# Patient Record
Sex: MALE | Race: OTHER | HISPANIC OR LATINO | URBAN - METROPOLITAN AREA
[De-identification: names, ages, dates, MRNs, and addresses within clinical notes are randomized per-mention and may not be internally consistent; named-entity substitution may affect disease eponyms.]

---

## 2020-02-24 ENCOUNTER — EMERGENCY (EMERGENCY)
Facility: HOSPITAL | Age: 45
LOS: 1 days | Discharge: HOME | End: 2020-02-24
Attending: STUDENT IN AN ORGANIZED HEALTH CARE EDUCATION/TRAINING PROGRAM
Payer: SELF-PAY

## 2020-02-24 VITALS
HEART RATE: 73 BPM | RESPIRATION RATE: 20 BRPM | SYSTOLIC BLOOD PRESSURE: 127 MMHG | TEMPERATURE: 98 F | HEIGHT: 65 IN | DIASTOLIC BLOOD PRESSURE: 80 MMHG | WEIGHT: 171.96 LBS | OXYGEN SATURATION: 100 %

## 2020-02-24 LAB
APPEARANCE UR: CLEAR — SIGNIFICANT CHANGE UP
BILIRUB UR-MCNC: NEGATIVE — SIGNIFICANT CHANGE UP
COLOR SPEC: YELLOW — SIGNIFICANT CHANGE UP
DIFF PNL FLD: NEGATIVE — SIGNIFICANT CHANGE UP
GLUCOSE UR QL: NEGATIVE — SIGNIFICANT CHANGE UP
KETONES UR-MCNC: NEGATIVE — SIGNIFICANT CHANGE UP
LEUKOCYTE ESTERASE UR-ACNC: NEGATIVE — SIGNIFICANT CHANGE UP
NITRITE UR-MCNC: NEGATIVE — SIGNIFICANT CHANGE UP
PH UR: 7 — SIGNIFICANT CHANGE UP (ref 5–8)
PROT UR-MCNC: NEGATIVE — SIGNIFICANT CHANGE UP
SP GR SPEC: 1 — LOW (ref 1.01–1.02)
UROBILINOGEN FLD QL: NEGATIVE — SIGNIFICANT CHANGE UP

## 2020-02-24 PROCEDURE — 87491 CHLMYD TRACH DNA AMP PROBE: CPT

## 2020-02-24 PROCEDURE — 99284 EMERGENCY DEPT VISIT MOD MDM: CPT

## 2020-02-24 PROCEDURE — 87086 URINE CULTURE/COLONY COUNT: CPT

## 2020-02-24 PROCEDURE — 87591 N.GONORRHOEAE DNA AMP PROB: CPT

## 2020-02-24 PROCEDURE — 76870 US EXAM SCROTUM: CPT | Mod: 26

## 2020-02-24 PROCEDURE — 81003 URINALYSIS AUTO W/O SCOPE: CPT

## 2020-02-24 PROCEDURE — 99284 EMERGENCY DEPT VISIT MOD MDM: CPT | Mod: 25

## 2020-02-24 PROCEDURE — 96372 THER/PROPH/DIAG INJ SC/IM: CPT

## 2020-02-24 PROCEDURE — 76870 US EXAM SCROTUM: CPT

## 2020-02-24 RX ORDER — CEFTRIAXONE 500 MG/1
250 INJECTION, POWDER, FOR SOLUTION INTRAMUSCULAR; INTRAVENOUS ONCE
Refills: 0 | Status: COMPLETED | OUTPATIENT
Start: 2020-02-24 | End: 2020-02-24

## 2020-02-24 RX ORDER — AZITHROMYCIN 500 MG/1
1000 TABLET, FILM COATED ORAL ONCE
Refills: 0 | Status: COMPLETED | OUTPATIENT
Start: 2020-02-24 | End: 2020-02-24

## 2020-02-24 RX ADMIN — AZITHROMYCIN 1000 MILLIGRAM(S): 500 TABLET, FILM COATED ORAL at 21:54

## 2020-02-24 RX ADMIN — CEFTRIAXONE 250 MILLIGRAM(S): 500 INJECTION, POWDER, FOR SOLUTION INTRAMUSCULAR; INTRAVENOUS at 21:54

## 2020-02-24 NOTE — ED ADULT NURSE NOTE - OBJECTIVE STATEMENT
States he has pain to testicles and penis since tuesday with frequency of urination .Case discussed by Dr. Alvarenga and Elis NP with [place]  . Patient able to understand and speak english with certain questions upon assessment offered  but declined at times .

## 2020-02-24 NOTE — ED PROVIDER NOTE - OBJECTIVE STATEMENT
44 y.o presenting with testicular pain. endorse dysuria, symptoms x 1 week. denies n, v, fever, cp, sob. endorses that he is sexually active on with his wife.

## 2020-02-24 NOTE — ED PROVIDER NOTE - CLINICAL SUMMARY MEDICAL DECISION MAKING FREE TEXT BOX
Patient presenting with testicular pain. will obtain ua, assess for infection, us r/o torsion, ed obs and reassess

## 2020-02-24 NOTE — ED PROVIDER NOTE - PSYCHIATRIC, MLM
Alert and oriented to person, place, time/situation. normal mood and affect. no apparent risk to self or others.
177.8

## 2020-02-24 NOTE — ED PROVIDER NOTE - PROGRESS NOTE DETAILS
patient endorses dysuria, will give gc treatment, sent for cutlure, given uro f.u, negatiev torsion. well appearing

## 2020-02-25 LAB
CULTURE RESULTS: NO GROWTH — SIGNIFICANT CHANGE UP
SPECIMEN SOURCE: SIGNIFICANT CHANGE UP

## 2020-02-26 LAB
C TRACH RRNA SPEC QL NAA+PROBE: SIGNIFICANT CHANGE UP
N GONORRHOEA RRNA SPEC QL NAA+PROBE: SIGNIFICANT CHANGE UP
SPECIMEN SOURCE: SIGNIFICANT CHANGE UP

## 2021-10-03 ENCOUNTER — EMERGENCY (EMERGENCY)
Facility: HOSPITAL | Age: 46
LOS: 1 days | Discharge: HOME | End: 2021-10-03
Attending: STUDENT IN AN ORGANIZED HEALTH CARE EDUCATION/TRAINING PROGRAM
Payer: MEDICAID

## 2021-10-03 VITALS
SYSTOLIC BLOOD PRESSURE: 117 MMHG | WEIGHT: 171.96 LBS | RESPIRATION RATE: 17 BRPM | DIASTOLIC BLOOD PRESSURE: 76 MMHG | TEMPERATURE: 98 F | OXYGEN SATURATION: 96 % | HEIGHT: 65 IN | HEART RATE: 69 BPM

## 2021-10-03 LAB
APPEARANCE UR: CLEAR — SIGNIFICANT CHANGE UP
BACTERIA # UR AUTO: ABNORMAL /HPF
BILIRUB UR-MCNC: NEGATIVE — SIGNIFICANT CHANGE UP
COLOR SPEC: YELLOW — SIGNIFICANT CHANGE UP
DIFF PNL FLD: ABNORMAL
EPI CELLS # UR: ABNORMAL /HPF
GLUCOSE UR QL: NEGATIVE — SIGNIFICANT CHANGE UP
KETONES UR-MCNC: NEGATIVE — SIGNIFICANT CHANGE UP
LEUKOCYTE ESTERASE UR-ACNC: NEGATIVE — SIGNIFICANT CHANGE UP
NITRITE UR-MCNC: NEGATIVE — SIGNIFICANT CHANGE UP
PH UR: 6.5 — SIGNIFICANT CHANGE UP (ref 5–8)
PROT UR-MCNC: NEGATIVE — SIGNIFICANT CHANGE UP
RBC CASTS # UR COMP ASSIST: ABNORMAL /HPF (ref 0–2)
SP GR SPEC: 1 — LOW (ref 1.01–1.02)
UROBILINOGEN FLD QL: NEGATIVE — SIGNIFICANT CHANGE UP
WBC UR QL: SIGNIFICANT CHANGE UP /HPF (ref 0–5)

## 2021-10-03 PROCEDURE — 76870 US EXAM SCROTUM: CPT

## 2021-10-03 PROCEDURE — 93975 VASCULAR STUDY: CPT | Mod: 26

## 2021-10-03 PROCEDURE — 87491 CHLMYD TRACH DNA AMP PROBE: CPT

## 2021-10-03 PROCEDURE — 76870 US EXAM SCROTUM: CPT | Mod: 26

## 2021-10-03 PROCEDURE — 81001 URINALYSIS AUTO W/SCOPE: CPT

## 2021-10-03 PROCEDURE — 93975 VASCULAR STUDY: CPT

## 2021-10-03 PROCEDURE — 87591 N.GONORRHOEAE DNA AMP PROB: CPT

## 2021-10-03 PROCEDURE — 99284 EMERGENCY DEPT VISIT MOD MDM: CPT | Mod: 25

## 2021-10-03 PROCEDURE — 99285 EMERGENCY DEPT VISIT HI MDM: CPT

## 2021-10-03 RX ORDER — CEPHALEXIN 500 MG
1 CAPSULE ORAL
Qty: 10 | Refills: 0
Start: 2021-10-03 | End: 2021-10-07

## 2021-10-03 RX ORDER — TAMSULOSIN HYDROCHLORIDE 0.4 MG/1
1 CAPSULE ORAL
Qty: 10 | Refills: 0
Start: 2021-10-03 | End: 2021-10-12

## 2021-10-03 RX ORDER — IBUPROFEN 200 MG
1 TABLET ORAL
Qty: 15 | Refills: 0
Start: 2021-10-03 | End: 2021-10-07

## 2021-10-03 NOTE — ED PROVIDER NOTE - OBJECTIVE STATEMENT
46yo M with pmh of BPH and previous renal stones presenting with several days of dysuria. Patient has been having mild suprapubic pain, but no fevers, flank pain, N, V, gross blood in the urine, penile discharge. Patient did endorse 2 days of right testicular pain as well but says has improved. Says feels like previous passing of stone as feels grainy sensation when finishing urination

## 2021-10-03 NOTE — ED PROVIDER NOTE - CLINICAL SUMMARY MEDICAL DECISION MAKING FREE TEXT BOX
Most consistent with kidney vs bladder stone passing +/- superimposed UTI. Testicular US with no evidence of epydidimitis. UA + for blood. Patient says after hydration and meds was able to pass a few tiny pieces in the bathroom. but still has some dysuria. Given improvement, stable vitals, good urine output and to hydro- on bedside sono assessment, like distal stone, non-obstructing +/- superimposed UTI. Will treat and send for close  f/u. Patient feels comfortable with the plan.

## 2021-10-03 NOTE — ED PROVIDER NOTE - PATIENT PORTAL LINK FT
You can access the FollowMyHealth Patient Portal offered by [facility] by registering at the following website: [email or web address]. By joining [employer]’s FollowMyHealth portal, you will also be able to view your health information using other applications (apps) compatible with our system.

## 2021-10-04 LAB
C TRACH RRNA SPEC QL NAA+PROBE: SIGNIFICANT CHANGE UP
N GONORRHOEA RRNA SPEC QL NAA+PROBE: SIGNIFICANT CHANGE UP

## 2021-10-30 ENCOUNTER — EMERGENCY (EMERGENCY)
Facility: HOSPITAL | Age: 46
LOS: 1 days | Discharge: HOME | End: 2021-10-30
Attending: STUDENT IN AN ORGANIZED HEALTH CARE EDUCATION/TRAINING PROGRAM
Payer: MEDICAID

## 2021-10-30 VITALS
SYSTOLIC BLOOD PRESSURE: 125 MMHG | TEMPERATURE: 98 F | DIASTOLIC BLOOD PRESSURE: 73 MMHG | WEIGHT: 175.05 LBS | HEIGHT: 65 IN | HEART RATE: 73 BPM | OXYGEN SATURATION: 97 % | RESPIRATION RATE: 16 BRPM

## 2021-10-30 LAB
APPEARANCE UR: CLEAR — SIGNIFICANT CHANGE UP
BACTERIA # UR AUTO: ABNORMAL /HPF
BILIRUB UR-MCNC: NEGATIVE — SIGNIFICANT CHANGE UP
COLOR SPEC: YELLOW — SIGNIFICANT CHANGE UP
DIFF PNL FLD: ABNORMAL
GLUCOSE UR QL: NEGATIVE — SIGNIFICANT CHANGE UP
KETONES UR-MCNC: NEGATIVE — SIGNIFICANT CHANGE UP
LEUKOCYTE ESTERASE UR-ACNC: NEGATIVE — SIGNIFICANT CHANGE UP
NITRITE UR-MCNC: NEGATIVE — SIGNIFICANT CHANGE UP
PH UR: 6 — SIGNIFICANT CHANGE UP (ref 5–8)
PROT UR-MCNC: NEGATIVE — SIGNIFICANT CHANGE UP
RBC CASTS # UR COMP ASSIST: ABNORMAL /HPF (ref 0–2)
SP GR SPEC: 1.01 — SIGNIFICANT CHANGE UP (ref 1.01–1.02)
UROBILINOGEN FLD QL: NEGATIVE — SIGNIFICANT CHANGE UP
WBC UR QL: SIGNIFICANT CHANGE UP /HPF (ref 0–5)

## 2021-10-30 PROCEDURE — 96372 THER/PROPH/DIAG INJ SC/IM: CPT

## 2021-10-30 PROCEDURE — 87086 URINE CULTURE/COLONY COUNT: CPT

## 2021-10-30 PROCEDURE — 99284 EMERGENCY DEPT VISIT MOD MDM: CPT

## 2021-10-30 PROCEDURE — 99283 EMERGENCY DEPT VISIT LOW MDM: CPT | Mod: 25

## 2021-10-30 PROCEDURE — 81001 URINALYSIS AUTO W/SCOPE: CPT

## 2021-10-30 RX ORDER — KETOROLAC TROMETHAMINE 30 MG/ML
30 SYRINGE (ML) INJECTION ONCE
Refills: 0 | Status: DISCONTINUED | OUTPATIENT
Start: 2021-10-30 | End: 2021-10-30

## 2021-10-30 RX ORDER — ACETAMINOPHEN 500 MG
975 TABLET ORAL ONCE
Refills: 0 | Status: COMPLETED | OUTPATIENT
Start: 2021-10-30 | End: 2021-10-30

## 2021-10-30 RX ADMIN — Medication 30 MILLIGRAM(S): at 19:46

## 2021-10-30 RX ADMIN — Medication 975 MILLIGRAM(S): at 19:46

## 2021-10-30 NOTE — ED PROVIDER NOTE - PATIENT PORTAL LINK FT
You can access the FollowMyHealth Patient Portal offered by [facility] by registering at the following website: [email or web address]. By joining [employer]’s FollowMyHealth portal, you will also be able to view your health information using other applications (apps) compatible with our system.

## 2021-10-30 NOTE — ED PROVIDER NOTE - PHYSICAL EXAMINATION
Gen: AAOx3, non-toxic  Head: NCAT  HEENT: EOMI, oral mucosa moist, normal conjunctiva  Lung: CTAB, no respiratory distress, no wheezes/rhonchi/rales B/L, speaking in full sentences  CV: RRR, no murmurs, rubs or gallops  Abd: soft, NTND, no guarding, no CVA tenderness  : no rash, no penile or testicular tenderness/redness/swelling, cremasteric reflex intact and equal b/l   MSK: no visible deformities  Neuro: No focal sensory or motor deficits  Skin: Warm, well perfused, no rash  Psych: normal affect.     Edilson Vargas, DO

## 2021-10-30 NOTE — ED PROVIDER NOTE - OBJECTIVE STATEMENT
45M with PMH including kidney stones and BPH p/w dysuria x 3 days. Denies any other symptoms including fever, abd pain, back pain, frequency, discharge, testicular pain, nausea, vomiting. Was here on 10/3 for similar symptoms at which time UA was sig for blood but no infection. Sates the dysuria resolved until recurring 3 days ago.

## 2021-10-30 NOTE — ED PROVIDER NOTE - NS ED ROS FT
ROS:  GENERAL: No fever, no chills  EYES: no change in vision  HEENT: no trouble swallowing, no trouble speaking  CARDIAC: no chest pain  PULMONARY: no cough, no shortness of breath  GI: no abdominal pain, no nausea, no vomiting, no diarrhea, no constipation  : +dysuria.  no frequency, no change in appearance, or odor of urine  SKIN: no rashes  NEURO: no headache, no weakness  MSK: No joint pain    Edilson Vargas, DO

## 2021-10-30 NOTE — ED PROVIDER NOTE - NSPTACCESSSVCSAPPTDETAILS_ED_ALL_ED_FT
needs urology within 1 week needs urology within 1 week -- call wife's number per pt's request [phone]

## 2021-10-30 NOTE — ED PROVIDER NOTE - CLINICAL SUMMARY MEDICAL DECISION MAKING FREE TEXT BOX
45M with PMH including kidney stones and BPH p/w dysuria x 3 days. Denies any other symptoms including abd/back pain, fever, N/V. No trouble voiding. Pt well appearing, normal  exam. Will assess for UTI and if normal have pt f/u with urology.

## 2021-10-31 PROBLEM — N40.0 BENIGN PROSTATIC HYPERPLASIA WITHOUT LOWER URINARY TRACT SYMPTOMS: Chronic | Status: ACTIVE | Noted: 2021-10-08

## 2021-10-31 PROBLEM — N20.0 CALCULUS OF KIDNEY: Chronic | Status: ACTIVE | Noted: 2021-10-08

## 2021-10-31 LAB
CULTURE RESULTS: SIGNIFICANT CHANGE UP
SPECIMEN SOURCE: SIGNIFICANT CHANGE UP

## 2021-11-05 ENCOUNTER — RESULT REVIEW (OUTPATIENT)
Age: 46
End: 2021-11-05

## 2021-11-05 ENCOUNTER — OUTPATIENT (OUTPATIENT)
Dept: OUTPATIENT SERVICES | Facility: HOSPITAL | Age: 46
LOS: 1 days | End: 2021-11-05
Payer: SELF-PAY

## 2021-11-05 ENCOUNTER — APPOINTMENT (OUTPATIENT)
Dept: UROLOGY | Facility: CLINIC | Age: 46
End: 2021-11-05

## 2021-11-05 VITALS
OXYGEN SATURATION: 98 % | HEIGHT: 65 IN | HEART RATE: 71 BPM | WEIGHT: 175 LBS | TEMPERATURE: 97.3 F | DIASTOLIC BLOOD PRESSURE: 72 MMHG | BODY MASS INDEX: 29.16 KG/M2 | RESPIRATION RATE: 18 BRPM | SYSTOLIC BLOOD PRESSURE: 109 MMHG

## 2021-11-05 DIAGNOSIS — Z87.442 PERSONAL HISTORY OF URINARY CALCULI: ICD-10-CM

## 2021-11-05 DIAGNOSIS — Z87.891 PERSONAL HISTORY OF NICOTINE DEPENDENCE: ICD-10-CM

## 2021-11-05 DIAGNOSIS — Z00.00 ENCOUNTER FOR GENERAL ADULT MEDICAL EXAMINATION WITHOUT ABNORMAL FINDINGS: ICD-10-CM

## 2021-11-05 PROCEDURE — G0463: CPT

## 2021-11-05 NOTE — PHYSICAL EXAM
[General Appearance - Well Developed] : well developed [General Appearance - Well Nourished] : well nourished [Normal Appearance] : normal appearance [Well Groomed] : well groomed [General Appearance - In No Acute Distress] : no acute distress [Edema] : no peripheral edema [Respiration, Rhythm And Depth] : normal respiratory rhythm and effort [Exaggerated Use Of Accessory Muscles For Inspiration] : no accessory muscle use [Abdomen Soft] : soft [Abdomen Tenderness] : non-tender [Costovertebral Angle Tenderness] : no ~M costovertebral angle tenderness [Urethral Meatus] : meatus normal [Penis Abnormality] : normal uncircumcised penis [Urinary Bladder Findings] : the bladder was normal on palpation [Scrotum] : the scrotum was normal [Epididymis] : the epididymides were normal [Testes Tenderness] : no tenderness of the testes [Testes Mass (___cm)] : there were no testicular masses [No Prostate Nodules] : no prostate nodules [Normal Station and Gait] : the gait and station were normal for the patient's age [] : no rash [No Focal Deficits] : no focal deficits [Oriented To Time, Place, And Person] : oriented to person, place, and time [Affect] : the affect was normal [Mood] : the mood was normal [Not Anxious] : not anxious [No Palpable Adenopathy] : no palpable adenopathy [FreeTextEntry1] : TESHA normal smooth nontender prostate

## 2021-11-05 NOTE — ASSESSMENT
[FreeTextEntry1] : 45 year old male with PMH nephrolithiasis, presenting with 6 weeks of dysuria and pain at the tip of penis not improved with keflex. \par \par - ua and ucx\par - doxycycline x 10 days\par - renal US \par - RTC 2 weeks\par - consider cystoscopy if symptoms persist and UA positive rbc \par - patient and spouse applied for sliding scale insurance with staff today

## 2021-11-05 NOTE — HISTORY OF PRESENT ILLNESS
[FreeTextEntry1] : 45 year old male with PMH nephrolithiasis 15 years ago presenting for initial visit after patient presented to the ED with dysuria 10/3. Pain first started in the last week of September and is described as burning with urination and pain at the tip at rest. In the ED, urine culture was negative, G/c urine was negative, US doppler testicles normal. UA 10-25 RBC, no gross hematuria, no history of smoking. Given keflex antibiotcs, which improved symptoms for 2 weeks but then it returned and patient went back to ED 10/30 and was referred to the urology clinic. Denies history of sexually transmitted diseases and declines repeat testing today. States he [place] many kidney [place], has not noted any renal colic or stones passing recently, and has not had any recent radiographic evaluation.

## 2021-11-08 DIAGNOSIS — R30.0 DYSURIA: ICD-10-CM

## 2021-11-08 DIAGNOSIS — Z87.442 PERSONAL HISTORY OF URINARY CALCULI: ICD-10-CM

## 2021-11-17 ENCOUNTER — APPOINTMENT (OUTPATIENT)
Dept: ULTRASOUND IMAGING | Facility: HOSPITAL | Age: 46
End: 2021-11-17
Payer: MEDICAID

## 2021-11-17 ENCOUNTER — APPOINTMENT (OUTPATIENT)
Dept: ULTRASOUND IMAGING | Facility: HOSPITAL | Age: 46
End: 2021-11-17

## 2021-11-17 ENCOUNTER — OUTPATIENT (OUTPATIENT)
Dept: OUTPATIENT SERVICES | Facility: HOSPITAL | Age: 46
LOS: 1 days | End: 2021-11-17
Payer: SELF-PAY

## 2021-11-17 DIAGNOSIS — R30.0 DYSURIA: ICD-10-CM

## 2021-11-17 PROCEDURE — 76775 US EXAM ABDO BACK WALL LIM: CPT

## 2021-11-17 PROCEDURE — 76857 US EXAM PELVIC LIMITED: CPT

## 2021-11-17 PROCEDURE — 76770 US EXAM ABDO BACK WALL COMP: CPT | Mod: 26

## 2021-12-02 ENCOUNTER — LABORATORY RESULT (OUTPATIENT)
Age: 46
End: 2021-12-02

## 2021-12-03 ENCOUNTER — APPOINTMENT (OUTPATIENT)
Dept: UROLOGY | Facility: CLINIC | Age: 46
End: 2021-12-03

## 2021-12-03 ENCOUNTER — OUTPATIENT (OUTPATIENT)
Dept: OUTPATIENT SERVICES | Facility: HOSPITAL | Age: 46
LOS: 1 days | End: 2021-12-03
Payer: SELF-PAY

## 2021-12-03 VITALS
OXYGEN SATURATION: 98 % | HEIGHT: 65 IN | TEMPERATURE: 97.8 F | BODY MASS INDEX: 29.16 KG/M2 | WEIGHT: 175 LBS | SYSTOLIC BLOOD PRESSURE: 125 MMHG | DIASTOLIC BLOOD PRESSURE: 79 MMHG | HEART RATE: 71 BPM

## 2021-12-03 DIAGNOSIS — Z00.00 ENCOUNTER FOR GENERAL ADULT MEDICAL EXAMINATION WITHOUT ABNORMAL FINDINGS: ICD-10-CM

## 2021-12-03 PROCEDURE — G0463: CPT

## 2021-12-03 NOTE — HISTORY OF PRESENT ILLNESS
[FreeTextEntry1] : 45 year old male with PMH nephrolithiasis 15 years ago presenting for follow-up visit after patient presented to the ED with dysuria 10/3. Pain first started in the last week of September and is described as burning with urination and pain at the tip at rest. In the ED, urine culture was negative, G/c urine was negative, US doppler testicles normal. UA 10-25 RBC, no gross hematuria, no history of smoking. Given keflex antibiotic which improved symptoms for 2 weeks but then it returned and patient went back to ED 10/30 and was referred to the urology clinic. Denies history of sexually transmitted diseases and declines repeat testing today. States he had many kidney stones pass [place] surgery, has not noted any renal colic or stones passing recently, and has not had any recent radiographic evaluation.\par \par Interval history :\par UA demonstrated 84 RBC \par Urine culture negative \par Ultrasound demonstrated bilateral nonobstructing renal stones measuring up to 4 mm on left and 1.1 cm on right without hydronephrosis and a 7mm non-mobile left posterior bladder wall calcification surrounded by soft tissue concerning for possible distal ureteral stone versus stone within ureterocele. \par \par Patient presents today with wife for follow-up. States he passed a stone while voiding which he brought to the clinic in a container. States the dysuria resolved after the stone passed. Denies hematuria, fevers.

## 2021-12-03 NOTE — ASSESSMENT
[FreeTextEntry1] : 45 year old male with PMH nephrolithiasis, presenting with 6 weeks of dysuria and pain at the tip of penis not improved with keflex. Ultrasound demonstrating bilateral nonobstructing intrarenal calculi and posterior wall stone, possible distal ureter. Patient passed a stone at home (brought to office) and pain has since resolved. \par Cystoscopy demonstrated normal bladder and ureteral orifices\par \par - CT renal stone hunt given bilateral stones on renal ultrasound\par - Renal ultrasound images reviewed demonstrating bilateral intrarenal stones as well as a 7 mm calcification in the posterior wall of the bladder\par - Urine culture\par - calculus analysis\par - Cystoscopy today demonstrates no urothelial lesions or calcifications in the bladder\par - OR planning for bilateral URS after CT scan

## 2021-12-07 DIAGNOSIS — R30.0 DYSURIA: ICD-10-CM

## 2021-12-07 DIAGNOSIS — N20.0 CALCULUS OF KIDNEY: ICD-10-CM

## 2021-12-09 ENCOUNTER — OUTPATIENT (OUTPATIENT)
Dept: OUTPATIENT SERVICES | Facility: HOSPITAL | Age: 46
LOS: 1 days | End: 2021-12-09
Payer: SELF-PAY

## 2021-12-09 ENCOUNTER — LABORATORY RESULT (OUTPATIENT)
Age: 46
End: 2021-12-09

## 2021-12-09 ENCOUNTER — APPOINTMENT (OUTPATIENT)
Dept: INTERNAL MEDICINE | Facility: CLINIC | Age: 46
End: 2021-12-09
Payer: COMMERCIAL

## 2021-12-09 VITALS
HEART RATE: 74 BPM | SYSTOLIC BLOOD PRESSURE: 112 MMHG | BODY MASS INDEX: 28.49 KG/M2 | HEIGHT: 65 IN | TEMPERATURE: 99 F | DIASTOLIC BLOOD PRESSURE: 74 MMHG | OXYGEN SATURATION: 98 % | WEIGHT: 171 LBS

## 2021-12-09 DIAGNOSIS — Z57.9 OCCUPATIONAL EXPOSURE TO UNSPECIFIED RISK FACTOR: ICD-10-CM

## 2021-12-09 DIAGNOSIS — Z00.00 ENCOUNTER FOR GENERAL ADULT MEDICAL EXAMINATION WITHOUT ABNORMAL FINDINGS: ICD-10-CM

## 2021-12-09 DIAGNOSIS — Z87.891 PERSONAL HISTORY OF NICOTINE DEPENDENCE: ICD-10-CM

## 2021-12-09 PROCEDURE — 85025 COMPLETE CBC W/AUTO DIFF WBC: CPT

## 2021-12-09 PROCEDURE — 86803 HEPATITIS C AB TEST: CPT

## 2021-12-09 PROCEDURE — G0463: CPT

## 2021-12-09 PROCEDURE — 80053 COMPREHEN METABOLIC PANEL: CPT

## 2021-12-09 PROCEDURE — 87340 HEPATITIS B SURFACE AG IA: CPT

## 2021-12-09 PROCEDURE — 99386 PREV VISIT NEW AGE 40-64: CPT

## 2021-12-09 PROCEDURE — 80061 LIPID PANEL: CPT

## 2021-12-09 PROCEDURE — 86706 HEP B SURFACE ANTIBODY: CPT

## 2021-12-09 PROCEDURE — 82306 VITAMIN D 25 HYDROXY: CPT

## 2021-12-09 PROCEDURE — 83036 HEMOGLOBIN GLYCOSYLATED A1C: CPT

## 2021-12-09 PROCEDURE — 81001 URINALYSIS AUTO W/SCOPE: CPT

## 2021-12-09 PROCEDURE — 84443 ASSAY THYROID STIM HORMONE: CPT

## 2021-12-09 SDOH — HEALTH STABILITY - PHYSICAL HEALTH: OCCUPATIONAL EXPOSURE TO UNSPECIFIED RISK FACTOR: Z57.9

## 2021-12-09 NOTE — HISTORY OF PRESENT ILLNESS
[[employer] ] : provided by [employer]   [Interpreters_IDNumber] : [id number] [TWNoteComboBox1] : [place]ogolese [de-identified] : 45 y.o MS w/pmhx of renal stone here to establish care and annual\par \par has never follow up with pcp \par \par denies of any HA/CP/SOB/Palpitation\par \par \par had recurrent uti was in ED and had renal stone \par  saw uro \par had US renal and planning for CT per uro \par current no uro symptoms\par \par ROS\par \par Constitutional:  no fever and no chills. \par Eyes:  no discharge and no pain. \par HEENT:  no earache and no hearing loss. \par Cardiovascular:  no chest pain, no palpitations and no leg claudication. \par Respiratory:  no shortness of breath, no wheezing and no cough. \par Gastrointestinal:  no abdominal pain, no nausea and no constipation. \par Genitourinary:  no dysuria and no incontinence. \par Musculoskeletal:  no joint pain, no joint stiffness and no joint swelling. \par Integumentary:  no itching and no mole changes. \par Neurological:  no headache, no dizziness and no fainting. \par Psychiatric:  not suicidal, no insomnia, no anxiety and no depression. \par \par Physical Exam\par \par Constitutional:   no acute distress, well nourished, well developed and well-appearing. \par Eyes:  normal sclera/conjunctiva, pupils equal round and reactive to light and extraocular movements intact. \par ENT:  the outer ears and nose were normal in appearance and the oropharynx was normal. \par Neck:  supple, no lymphadenopathy and the thyroid was normal and there were no nodules present. \par Pulmonary:  no respiratory distress, lungs were clear to auscultation bilaterally, no accessory muscle use. \par Cardiac:  normal rate, with a regular rhythm, normal S1 and S2 and no murmur heard. \par Vascular:  there was no peripheral edema. \par Abdomen:  abdomen soft, non-tender, non-distended, no abdominal mass palpated, no HSM and normal bowel sounds. \par Lymphatic:  no posterior cervical lymphadenopathy, no anterior cervical lymphadenopathy. \par Back:  no CVA tenderness and no spinal tenderness. \par Musculoskeletal: no joint swelling and grossly normal strength/tone. \par Skin:  no rash. \par Neurology:  normal gait, coordination grossly intact, no focal deficits and deep tendon reflexes were 2+ and symmetric. \par Psychiatric:  the affect was normal, oriented to person, place, and time and insight and judgment were intact.\par

## 2021-12-09 NOTE — ASSESSMENT
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

## 2021-12-09 NOTE — HEALTH RISK ASSESSMENT
[No] : In the past 12 months have you used drugs other than those required for medical reasons? No [0] : 2) Feeling down, depressed, or hopeless: Not at all (0) [With Family] : lives with family [Employed] : employed [Sexually Active] : sexually active [Feels Safe at Home] : Feels safe at home [Fully functional (bathing, dressing, toileting, transferring, walking, feeding)] : Fully functional (bathing, dressing, toileting, transferring, walking, feeding) [Fully functional (using the telephone, shopping, preparing meals, housekeeping, doing laundry, using] : Fully functional and needs no help or supervision to perform IADLs (using the telephone, shopping, preparing meals, housekeeping, doing laundry, using transportation, managing medications and managing finances) [] : No [de-identified] : quit smoking 15 yrs; 1cigerates a day for 20 yrs; [OSF6Knlfs] : 0 [High Risk Behavior] : no high risk behavior [FreeTextEntry2] : construction;

## 2021-12-14 DIAGNOSIS — Z57.9 OCCUPATIONAL EXPOSURE TO UNSPECIFIED RISK FACTOR: ICD-10-CM

## 2021-12-14 DIAGNOSIS — Z87.891 PERSONAL HISTORY OF NICOTINE DEPENDENCE: ICD-10-CM

## 2021-12-14 DIAGNOSIS — Z12.11 ENCOUNTER FOR SCREENING FOR MALIGNANT NEOPLASM OF COLON: ICD-10-CM

## 2021-12-14 DIAGNOSIS — N20.0 CALCULUS OF KIDNEY: ICD-10-CM

## 2021-12-14 SDOH — HEALTH STABILITY - PHYSICAL HEALTH: OCCUPATIONAL EXPOSURE TO UNSPECIFIED RISK FACTOR: Z57.9

## 2021-12-15 ENCOUNTER — OUTPATIENT (OUTPATIENT)
Dept: OUTPATIENT SERVICES | Facility: HOSPITAL | Age: 46
LOS: 1 days | End: 2021-12-15
Payer: SELF-PAY

## 2021-12-15 ENCOUNTER — APPOINTMENT (OUTPATIENT)
Dept: CT IMAGING | Facility: HOSPITAL | Age: 46
End: 2021-12-15

## 2021-12-15 DIAGNOSIS — Z57.9 OCCUPATIONAL EXPOSURE TO UNSPECIFIED RISK FACTOR: ICD-10-CM

## 2021-12-15 DIAGNOSIS — Z87.442 PERSONAL HISTORY OF URINARY CALCULI: ICD-10-CM

## 2021-12-15 DIAGNOSIS — Z87.891 PERSONAL HISTORY OF NICOTINE DEPENDENCE: ICD-10-CM

## 2021-12-15 PROCEDURE — 82274 ASSAY TEST FOR BLOOD FECAL: CPT

## 2021-12-15 PROCEDURE — 74176 CT ABD & PELVIS W/O CONTRAST: CPT | Mod: 26

## 2021-12-15 PROCEDURE — 71046 X-RAY EXAM CHEST 2 VIEWS: CPT

## 2021-12-15 PROCEDURE — 74176 CT ABD & PELVIS W/O CONTRAST: CPT

## 2021-12-15 PROCEDURE — 71046 X-RAY EXAM CHEST 2 VIEWS: CPT | Mod: 26

## 2021-12-15 SDOH — HEALTH STABILITY - PHYSICAL HEALTH: OCCUPATIONAL EXPOSURE TO UNSPECIFIED RISK FACTOR: Z57.9

## 2021-12-23 ENCOUNTER — OUTPATIENT (OUTPATIENT)
Dept: OUTPATIENT SERVICES | Facility: HOSPITAL | Age: 46
LOS: 1 days | End: 2021-12-23
Payer: SELF-PAY

## 2021-12-23 ENCOUNTER — APPOINTMENT (OUTPATIENT)
Dept: INTERNAL MEDICINE | Facility: CLINIC | Age: 46
End: 2021-12-23
Payer: COMMERCIAL

## 2021-12-23 VITALS
DIASTOLIC BLOOD PRESSURE: 82 MMHG | OXYGEN SATURATION: 97 % | RESPIRATION RATE: 18 BRPM | HEIGHT: 65 IN | SYSTOLIC BLOOD PRESSURE: 121 MMHG | WEIGHT: 174 LBS | BODY MASS INDEX: 28.99 KG/M2 | HEART RATE: 85 BPM | TEMPERATURE: 97.2 F

## 2021-12-23 DIAGNOSIS — Z23 ENCOUNTER FOR IMMUNIZATION: ICD-10-CM

## 2021-12-23 DIAGNOSIS — Z78.9 OTHER SPECIFIED HEALTH STATUS: ICD-10-CM

## 2021-12-23 DIAGNOSIS — E78.5 HYPERLIPIDEMIA, UNSPECIFIED: ICD-10-CM

## 2021-12-23 DIAGNOSIS — N20.0 CALCULUS OF KIDNEY: ICD-10-CM

## 2021-12-23 DIAGNOSIS — E55.9 VITAMIN D DEFICIENCY, UNSPECIFIED: ICD-10-CM

## 2021-12-23 DIAGNOSIS — Z00.00 ENCOUNTER FOR GENERAL ADULT MEDICAL EXAMINATION WITHOUT ABNORMAL FINDINGS: ICD-10-CM

## 2021-12-23 LAB
25(OH)D3 SERPL-MCNC: 18 NG/ML
ALBUMIN SERPL ELPH-MCNC: 4.6 G/DL
ALP BLD-CCNC: 110 U/L
ALT SERPL-CCNC: 29 U/L
ANION GAP SERPL CALC-SCNC: 15 MMOL/L
APPEARANCE: ABNORMAL
AST SERPL-CCNC: 24 U/L
BASOPHILS # BLD AUTO: 0.02 K/UL
BASOPHILS NFR BLD AUTO: 0.3 %
BILIRUB SERPL-MCNC: 0.7 MG/DL
BILIRUBIN URINE: NEGATIVE
BLOOD URINE: NORMAL
BUN SERPL-MCNC: 13 MG/DL
CALCIUM SERPL-MCNC: 9.1 MG/DL
CHLORIDE SERPL-SCNC: 104 MMOL/L
CHOLEST SERPL-MCNC: 223 MG/DL
CO2 SERPL-SCNC: 22 MMOL/L
COLOR: YELLOW
CREAT SERPL-MCNC: 0.68 MG/DL
EOSINOPHIL # BLD AUTO: 0.06 K/UL
EOSINOPHIL NFR BLD AUTO: 0.9 %
ESTIMATED AVERAGE GLUCOSE: 103 MG/DL
GLUCOSE QUALITATIVE U: NEGATIVE
GLUCOSE SERPL-MCNC: 82 MG/DL
HBA1C MFR BLD HPLC: 5.2 %
HBV SURFACE AB SER QL: NONREACTIVE
HBV SURFACE AG SER QL: NONREACTIVE
HCT VFR BLD CALC: 46.1 %
HCV AB SER QL: NONREACTIVE
HCV S/CO RATIO: 0.2 S/CO
HDLC SERPL-MCNC: 56 MG/DL
HGB BLD-MCNC: 15.5 G/DL
IMM GRANULOCYTES NFR BLD AUTO: 0.2 %
KETONES URINE: NEGATIVE
LDLC SERPL CALC-MCNC: 139 MG/DL
LEUKOCYTE ESTERASE URINE: NEGATIVE
LYMPHOCYTES # BLD AUTO: 1.72 K/UL
LYMPHOCYTES NFR BLD AUTO: 26.7 %
MAN DIFF?: NORMAL
MCHC RBC-ENTMCNC: 29.8 PG
MCHC RBC-ENTMCNC: 33.6 GM/DL
MCV RBC AUTO: 88.7 FL
MONOCYTES # BLD AUTO: 0.47 K/UL
MONOCYTES NFR BLD AUTO: 7.3 %
NEUTROPHILS # BLD AUTO: 4.16 K/UL
NEUTROPHILS NFR BLD AUTO: 64.6 %
NITRITE URINE: NEGATIVE
NONHDLC SERPL-MCNC: 167 MG/DL
PH URINE: 6
PLATELET # BLD AUTO: 286 K/UL
POTASSIUM SERPL-SCNC: 5.2 MMOL/L
PROT SERPL-MCNC: 7.6 G/DL
PROTEIN URINE: NORMAL
RBC # BLD: 5.2 M/UL
RBC # FLD: 12.5 %
SODIUM SERPL-SCNC: 141 MMOL/L
SPECIFIC GRAVITY URINE: 1.02
TRIGL SERPL-MCNC: 140 MG/DL
TSH SERPL-ACNC: 1.16 UIU/ML
UROBILINOGEN URINE: NORMAL
WBC # FLD AUTO: 6.44 K/UL

## 2021-12-23 PROCEDURE — 99213 OFFICE O/P EST LOW 20 MIN: CPT

## 2021-12-23 PROCEDURE — G0463: CPT

## 2021-12-23 NOTE — ASSESSMENT
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

## 2021-12-23 NOTE — HISTORY OF PRESENT ILLNESS
[[employer] ] : provided by [employer]   [de-identified] : 45 y.o MS w/pmhx of renal stone here to establish care and annual\par \par has never follow up with pcp \par \par denies of any HA/CP/SOB/Palpitation\par \par \par had recurrent uti was in ED and had renal stone \par  saw uro \par had US renal and planning for CT per uro \par current no uro symptoms\par \par ROS\par \par Constitutional:  no fever and no chills. \par Eyes:  no discharge and no pain. \par HEENT:  no earache and no hearing loss. \par Cardiovascular:  no chest pain, no palpitations and no leg claudication. \par Respiratory:  no shortness of breath, no wheezing and no cough. \par Gastrointestinal:  no abdominal pain, no nausea and no constipation. \par Genitourinary:  no dysuria and no incontinence. \par Musculoskeletal:  no joint pain, no joint stiffness and no joint swelling. \par Integumentary:  no itching and no mole changes. \par Neurological:  no headache, no dizziness and no fainting. \par Psychiatric:  not suicidal, no insomnia, no anxiety and no depression. \par \par Physical Exam\par \par Constitutional:   no acute distress, well nourished, well developed and well-appearing. \par Eyes:  normal sclera/conjunctiva, pupils equal round and reactive to light and extraocular movements intact. \par ENT:  the outer ears and nose were normal in appearance and the oropharynx was normal. \par Neck:  supple, no lymphadenopathy and the thyroid was normal and there were no nodules present. \par Pulmonary:  no respiratory distress, lungs were clear to auscultation bilaterally, no accessory muscle use. \par Cardiac:  normal rate, with a regular rhythm, normal S1 and S2 and no murmur heard. \par Vascular:  there was no peripheral edema. \par Abdomen:  abdomen soft, non-tender, non-distended, no abdominal mass palpated, no HSM and normal bowel sounds. \par Lymphatic:  no posterior cervical lymphadenopathy, no anterior cervical lymphadenopathy. \par Back:  no CVA tenderness and no spinal tenderness. \par Musculoskeletal: no joint swelling and grossly normal strength/tone. \par Skin:  no rash. \par Neurology:  normal gait, coordination grossly intact, no focal deficits and deep tendon reflexes were 2+ and symmetric. \par Psychiatric:  the affect was normal, oriented to person, place, and time and insight and judgment were intact.\par  [Interpreters_IDNumber] : [id number] [TWNoteComboBox1] : [place]ic

## 2021-12-23 NOTE — HEALTH RISK ASSESSMENT
[No] : In the past 12 months have you used drugs other than those required for medical reasons? No [0] : 2) Feeling down, depressed, or hopeless: Not at all (0) [With Family] : lives with family [Employed] : employed [Sexually Active] : sexually active [Feels Safe at Home] : Feels safe at home [Fully functional (bathing, dressing, toileting, transferring, walking, feeding)] : Fully functional (bathing, dressing, toileting, transferring, walking, feeding) [Fully functional (using the telephone, shopping, preparing meals, housekeeping, doing laundry, using] : Fully functional and needs no help or supervision to perform IADLs (using the telephone, shopping, preparing meals, housekeeping, doing laundry, using transportation, managing medications and managing finances) [de-identified] : quit smoking 15 yrs; 1cigerates a day for 20 yrs; [HBB7Hjtow] : 0 [High Risk Behavior] : no high risk behavior [FreeTextEntry2] : construction;

## 2022-01-07 ENCOUNTER — APPOINTMENT (OUTPATIENT)
Dept: UROLOGY | Facility: CLINIC | Age: 47
End: 2022-01-07

## 2022-02-04 ENCOUNTER — OUTPATIENT (OUTPATIENT)
Dept: OUTPATIENT SERVICES | Facility: HOSPITAL | Age: 47
LOS: 1 days | End: 2022-02-04
Payer: SELF-PAY

## 2022-02-04 ENCOUNTER — APPOINTMENT (OUTPATIENT)
Dept: UROLOGY | Facility: CLINIC | Age: 47
End: 2022-02-04
Payer: SELF-PAY

## 2022-02-04 VITALS
DIASTOLIC BLOOD PRESSURE: 71 MMHG | TEMPERATURE: 98 F | WEIGHT: 172 LBS | RESPIRATION RATE: 18 BRPM | HEART RATE: 60 BPM | OXYGEN SATURATION: 98 % | BODY MASS INDEX: 28.66 KG/M2 | HEIGHT: 65 IN | SYSTOLIC BLOOD PRESSURE: 111 MMHG

## 2022-02-04 DIAGNOSIS — Z00.00 ENCOUNTER FOR GENERAL ADULT MEDICAL EXAMINATION WITHOUT ABNORMAL FINDINGS: ICD-10-CM

## 2022-02-04 PROCEDURE — 99214 OFFICE O/P EST MOD 30 MIN: CPT

## 2022-02-04 PROCEDURE — G0463: CPT

## 2022-02-04 NOTE — ASSESSMENT
[FreeTextEntry1] : 46 year old male with bilateral renal calculi presents for follow up. \par \par -- Reviewed CT scan demonstrating bilateral non-obstructing renal stones, largest in right upper pole measuring 7 mm. \par -- Reviewed calculus analysis from 12/3 passed stone demonstrated 80% calcium oxalate monohydrate and 20% calcium phosphate (apatite). \par -- I discussed the different treatment modalities for nephrolithiasis with the patient, including medical management, spontaneous stone passage, percutaneous stone extraction, extracorporeal shock wave lithotripsy, and ureteroscopy with laser lithotripsy and stone extraction. Given the size and location of the stone, the patient opted to proceed with ureteroscopy.  The risks, benefits, and alternatives to ureteroscopy were discussed with the patient, including but not limited to pain, infection, bleeding, bladder injury, ureteral injury, renal injury, and treatment failure.  I also discussed the possible need for a temporary ureteral stent.  I discussed the possible side effects of a temporary ureteral stent, including flank pain, hematuria, and bladder spasms.  The patient understands that a stent is a temporary implant that must be removed in the future.  The patient wishes to proceed and we will schedule the surgery for the near future.\par -- Discussed alternative management of monitoring calculus with follow-up imaging in several months but discussed that this may put patient at risk for future pain, infections and kidney injury\par -- Urine culture today\par -- Medical clearance documented in chart\par

## 2022-02-07 DIAGNOSIS — N20.0 CALCULUS OF KIDNEY: ICD-10-CM

## 2022-02-28 ENCOUNTER — OUTPATIENT (OUTPATIENT)
Dept: OUTPATIENT SERVICES | Facility: HOSPITAL | Age: 47
LOS: 1 days | End: 2022-02-28
Payer: SELF-PAY

## 2022-02-28 VITALS
SYSTOLIC BLOOD PRESSURE: 118 MMHG | HEIGHT: 63 IN | RESPIRATION RATE: 16 BRPM | HEART RATE: 66 BPM | OXYGEN SATURATION: 99 % | TEMPERATURE: 98 F | WEIGHT: 171.96 LBS | DIASTOLIC BLOOD PRESSURE: 84 MMHG

## 2022-02-28 DIAGNOSIS — G47.33 OBSTRUCTIVE SLEEP APNEA (ADULT) (PEDIATRIC): ICD-10-CM

## 2022-02-28 DIAGNOSIS — Z29.9 ENCOUNTER FOR PROPHYLACTIC MEASURES, UNSPECIFIED: ICD-10-CM

## 2022-02-28 DIAGNOSIS — N20.0 CALCULUS OF KIDNEY: ICD-10-CM

## 2022-02-28 DIAGNOSIS — Z01.818 ENCOUNTER FOR OTHER PREPROCEDURAL EXAMINATION: ICD-10-CM

## 2022-02-28 LAB
ANION GAP SERPL CALC-SCNC: 3 MMOL/L — LOW (ref 5–17)
APPEARANCE UR: CLEAR — SIGNIFICANT CHANGE UP
BACTERIA # UR AUTO: ABNORMAL /HPF
BILIRUB UR-MCNC: NEGATIVE — SIGNIFICANT CHANGE UP
BUN SERPL-MCNC: 17 MG/DL — SIGNIFICANT CHANGE UP (ref 7–18)
CALCIUM SERPL-MCNC: 8.9 MG/DL — SIGNIFICANT CHANGE UP (ref 8.4–10.5)
CHLORIDE SERPL-SCNC: 111 MMOL/L — HIGH (ref 96–108)
CO2 SERPL-SCNC: 24 MMOL/L — SIGNIFICANT CHANGE UP (ref 22–31)
COLOR SPEC: YELLOW — SIGNIFICANT CHANGE UP
CREAT SERPL-MCNC: 0.69 MG/DL — SIGNIFICANT CHANGE UP (ref 0.5–1.3)
DIFF PNL FLD: ABNORMAL
EGFR: 114 ML/MIN/1.73M2 — SIGNIFICANT CHANGE UP
GLUCOSE SERPL-MCNC: 90 MG/DL — SIGNIFICANT CHANGE UP (ref 70–99)
GLUCOSE UR QL: NEGATIVE — SIGNIFICANT CHANGE UP
HCT VFR BLD CALC: 42.7 % — SIGNIFICANT CHANGE UP (ref 39–50)
HGB BLD-MCNC: 14.5 G/DL — SIGNIFICANT CHANGE UP (ref 13–17)
KETONES UR-MCNC: ABNORMAL
LEUKOCYTE ESTERASE UR-ACNC: NEGATIVE — SIGNIFICANT CHANGE UP
MCHC RBC-ENTMCNC: 28.7 PG — SIGNIFICANT CHANGE UP (ref 27–34)
MCHC RBC-ENTMCNC: 34 GM/DL — SIGNIFICANT CHANGE UP (ref 32–36)
MCV RBC AUTO: 84.4 FL — SIGNIFICANT CHANGE UP (ref 80–100)
NITRITE UR-MCNC: NEGATIVE — SIGNIFICANT CHANGE UP
NRBC # BLD: 0 /100 WBCS — SIGNIFICANT CHANGE UP (ref 0–0)
PH UR: 6 — SIGNIFICANT CHANGE UP (ref 5–8)
PLATELET # BLD AUTO: 261 K/UL — SIGNIFICANT CHANGE UP (ref 150–400)
POTASSIUM SERPL-MCNC: 3.8 MMOL/L — SIGNIFICANT CHANGE UP (ref 3.5–5.3)
POTASSIUM SERPL-SCNC: 3.8 MMOL/L — SIGNIFICANT CHANGE UP (ref 3.5–5.3)
PROT UR-MCNC: 15
RBC # BLD: 5.06 M/UL — SIGNIFICANT CHANGE UP (ref 4.2–5.8)
RBC # FLD: 12.8 % — SIGNIFICANT CHANGE UP (ref 10.3–14.5)
RBC CASTS # UR COMP ASSIST: SIGNIFICANT CHANGE UP /HPF (ref 0–2)
SARS-COV-2 RNA SPEC QL NAA+PROBE: SIGNIFICANT CHANGE UP
SODIUM SERPL-SCNC: 138 MMOL/L — SIGNIFICANT CHANGE UP (ref 135–145)
SP GR SPEC: 1.02 — SIGNIFICANT CHANGE UP (ref 1.01–1.02)
UROBILINOGEN FLD QL: NEGATIVE — SIGNIFICANT CHANGE UP
WBC # BLD: 6.3 K/UL — SIGNIFICANT CHANGE UP (ref 3.8–10.5)
WBC # FLD AUTO: 6.3 K/UL — SIGNIFICANT CHANGE UP (ref 3.8–10.5)
WBC UR QL: SIGNIFICANT CHANGE UP /HPF (ref 0–5)

## 2022-02-28 PROCEDURE — G0463: CPT

## 2022-02-28 PROCEDURE — 93005 ELECTROCARDIOGRAM TRACING: CPT

## 2022-02-28 PROCEDURE — 93010 ELECTROCARDIOGRAM REPORT: CPT

## 2022-02-28 NOTE — H&P PST ADULT - ASSESSMENT
45 yo male is scheduled for :  Cystoscopy  Bilateral Ureteroscopy  Laser Lithotripsy  and Ureteral Stent Placement, on 3/3/22

## 2022-02-28 NOTE — H&P PST ADULT - HISTORY OF PRESENT ILLNESS
45 yo male with history of BPH, reports the above.  Patient states he started to right flank and right abdominal pain last year with hematuria. He did pass one stone which he brought to his doctor for analysis. Then, a cat scan discovered more stones in both kidneys.  Patient denies pain or hematuria at this time.  He is scheduled for : Cystoscopy  Bilateral Ureteroscopy  Laser Lithotripsy  and Ureteral Stent Placement, on 3/3/22 47 yo male with history of BPH, reports the above.  Patient states he started to right flank and right abdominal pain last year with hematuria. He did pass one stone which he brought to his doctor for analysis. Then, a CT scan identified more stones in both kidneys.  Patient denies pain or hematuria at this time.  He is scheduled for : Cystoscopy  Bilateral Ureteroscopy  Laser Lithotripsy  and Ureteral Stent Placement, on 3/3/22

## 2022-02-28 NOTE — H&P PST ADULT - NSANTHOSAYNRD_GEN_A_CORE
No. SCOT screening performed.  STOP BANG Legend: 0-2 = LOW Risk; 3-4 = INTERMEDIATE Risk; 5-8 = HIGH Risk

## 2022-03-01 LAB
CULTURE RESULTS: SIGNIFICANT CHANGE UP
SPECIMEN SOURCE: SIGNIFICANT CHANGE UP

## 2022-03-02 ENCOUNTER — TRANSCRIPTION ENCOUNTER (OUTPATIENT)
Age: 47
End: 2022-03-02

## 2022-03-03 ENCOUNTER — OUTPATIENT (OUTPATIENT)
Dept: OUTPATIENT SERVICES | Facility: HOSPITAL | Age: 47
LOS: 1 days | Discharge: HOME | End: 2022-03-03
Payer: SELF-PAY

## 2022-03-03 ENCOUNTER — RESULT REVIEW (OUTPATIENT)
Age: 47
End: 2022-03-03

## 2022-03-03 ENCOUNTER — APPOINTMENT (OUTPATIENT)
Dept: UROLOGY | Facility: HOSPITAL | Age: 47
End: 2022-03-03

## 2022-03-03 VITALS
RESPIRATION RATE: 18 BRPM | DIASTOLIC BLOOD PRESSURE: 82 MMHG | OXYGEN SATURATION: 100 % | HEIGHT: 63 IN | TEMPERATURE: 98 F | SYSTOLIC BLOOD PRESSURE: 116 MMHG | WEIGHT: 171.96 LBS | HEART RATE: 74 BPM

## 2022-03-03 VITALS
SYSTOLIC BLOOD PRESSURE: 109 MMHG | RESPIRATION RATE: 18 BRPM | DIASTOLIC BLOOD PRESSURE: 66 MMHG | TEMPERATURE: 98 F | OXYGEN SATURATION: 100 % | HEART RATE: 63 BPM

## 2022-03-03 DIAGNOSIS — N20.0 CALCULUS OF KIDNEY: ICD-10-CM

## 2022-03-03 PROCEDURE — 82365 CALCULUS SPECTROSCOPY: CPT

## 2022-03-03 PROCEDURE — 88300 SURGICAL PATH GROSS: CPT

## 2022-03-03 PROCEDURE — 52332 CYSTOSCOPY AND TREATMENT: CPT | Mod: 51,50

## 2022-03-03 PROCEDURE — 52332 CYSTOSCOPY AND TREATMENT: CPT | Mod: 50

## 2022-03-03 PROCEDURE — 74420 UROGRAPHY RTRGR +-KUB: CPT | Mod: 26

## 2022-03-03 PROCEDURE — 52352 CYSTOURETERO W/STONE REMOVE: CPT | Mod: 50

## 2022-03-03 PROCEDURE — C1889: CPT

## 2022-03-03 PROCEDURE — C1758: CPT

## 2022-03-03 PROCEDURE — C2625: CPT

## 2022-03-03 PROCEDURE — 88300 SURGICAL PATH GROSS: CPT | Mod: 26

## 2022-03-03 PROCEDURE — C1769: CPT

## 2022-03-03 PROCEDURE — 76000 FLUOROSCOPY <1 HR PHYS/QHP: CPT

## 2022-03-03 DEVICE — LASER FIBER FLEXIVA 365
Type: IMPLANTABLE DEVICE | Status: NON-FUNCTIONAL
Removed: 2022-03-03

## 2022-03-03 DEVICE — BASKET GEMINI HELICAL 3FR 120CM X 11MM 4 WIRES  OD X Â??0Â?? TIP
Type: IMPLANTABLE DEVICE | Status: NON-FUNCTIONAL
Removed: 2022-03-03

## 2022-03-03 DEVICE — LASER FIBER FLEXIVA 550
Type: IMPLANTABLE DEVICE | Status: NON-FUNCTIONAL
Removed: 2022-03-03

## 2022-03-03 DEVICE — CATH URET 5FR 70CM
Type: IMPLANTABLE DEVICE | Status: NON-FUNCTIONAL
Removed: 2022-03-03

## 2022-03-03 DEVICE — BASKET ZEROTIP NITINOL 1.9FR 120CM X 12MM 4 WIRES
Type: IMPLANTABLE DEVICE | Status: NON-FUNCTIONAL
Removed: 2022-03-03

## 2022-03-03 DEVICE — KIT URET PERFLX PLUS 6FRX24CM
Type: IMPLANTABLE DEVICE | Status: NON-FUNCTIONAL
Removed: 2022-03-03

## 2022-03-03 DEVICE — CATH URET 2LUM 10FR 50CM
Type: IMPLANTABLE DEVICE | Status: NON-FUNCTIONAL
Removed: 2022-03-03

## 2022-03-03 DEVICE — GUIDEWIRE .038IN X 3CM ANGLE X 150CM
Type: IMPLANTABLE DEVICE | Status: NON-FUNCTIONAL
Removed: 2022-03-03

## 2022-03-03 DEVICE — LASER FIBER FLEXIVA 242 HI POWER
Type: IMPLANTABLE DEVICE | Status: NON-FUNCTIONAL
Removed: 2022-03-03

## 2022-03-03 DEVICE — CATH URET CONE TIP 8FR WVN
Type: IMPLANTABLE DEVICE | Status: NON-FUNCTIONAL
Removed: 2022-03-03

## 2022-03-03 DEVICE — CATH URET STONE DISPLC DL 10FR
Type: IMPLANTABLE DEVICE | Status: NON-FUNCTIONAL
Removed: 2022-03-03

## 2022-03-03 DEVICE — BASKET 3FR NCIRCLE TIPLESS STONE EXTRACTOR
Type: IMPLANTABLE DEVICE | Status: NON-FUNCTIONAL
Removed: 2022-03-03

## 2022-03-03 DEVICE — URETERAL SHEATH NAVIGATOR HD 12/14FR X 36CM
Type: IMPLANTABLE DEVICE | Status: NON-FUNCTIONAL
Removed: 2022-03-03

## 2022-03-03 DEVICE — GUIDEWIRE AMPLATZ EXTRA STIFF
Type: IMPLANTABLE DEVICE | Status: NON-FUNCTIONAL
Removed: 2022-03-03

## 2022-03-03 DEVICE — GUIDEWIRE AMPLATZ SUPER STIFF
Type: IMPLANTABLE DEVICE | Status: NON-FUNCTIONAL
Removed: 2022-03-03

## 2022-03-03 DEVICE — GWIRE SENS NIT 0.035INX150CM
Type: IMPLANTABLE DEVICE | Status: NON-FUNCTIONAL
Removed: 2022-03-03

## 2022-03-03 DEVICE — GWIRE SENSOR DUAL-FLEX NITINOL0.038INX150CM
Type: IMPLANTABLE DEVICE | Status: NON-FUNCTIONAL
Removed: 2022-03-03

## 2022-03-03 DEVICE — CATH URET M/F SH 14FR
Type: IMPLANTABLE DEVICE | Status: NON-FUNCTIONAL
Removed: 2022-03-03

## 2022-03-03 RX ORDER — FENTANYL CITRATE 50 UG/ML
25 INJECTION INTRAVENOUS
Refills: 0 | Status: DISCONTINUED | OUTPATIENT
Start: 2022-03-03 | End: 2022-03-04

## 2022-03-03 RX ORDER — FENTANYL CITRATE 50 UG/ML
50 INJECTION INTRAVENOUS
Refills: 0 | Status: DISCONTINUED | OUTPATIENT
Start: 2022-03-03 | End: 2022-03-04

## 2022-03-03 RX ORDER — TAMSULOSIN HYDROCHLORIDE 0.4 MG/1
1 CAPSULE ORAL
Qty: 10 | Refills: 0
Start: 2022-03-03 | End: 2022-03-12

## 2022-03-03 RX ORDER — PHENAZOPYRIDINE HCL 100 MG
1 TABLET ORAL
Qty: 9 | Refills: 0
Start: 2022-03-03 | End: 2022-03-05

## 2022-03-03 RX ORDER — SODIUM CHLORIDE 9 MG/ML
1000 INJECTION, SOLUTION INTRAVENOUS
Refills: 0 | Status: DISCONTINUED | OUTPATIENT
Start: 2022-03-03 | End: 2022-03-04

## 2022-03-03 RX ORDER — CEPHALEXIN 500 MG
1 CAPSULE ORAL
Qty: 6 | Refills: 0
Start: 2022-03-03 | End: 2022-03-05

## 2022-03-03 RX ORDER — SODIUM CHLORIDE 9 MG/ML
3 INJECTION INTRAMUSCULAR; INTRAVENOUS; SUBCUTANEOUS EVERY 8 HOURS
Refills: 0 | Status: DISCONTINUED | OUTPATIENT
Start: 2022-03-03 | End: 2022-03-03

## 2022-03-03 RX ORDER — ACETAMINOPHEN 500 MG
1000 TABLET ORAL ONCE
Refills: 0 | Status: DISCONTINUED | OUTPATIENT
Start: 2022-03-03 | End: 2022-03-04

## 2022-03-03 RX ADMIN — SODIUM CHLORIDE 3 MILLILITER(S): 9 INJECTION INTRAMUSCULAR; INTRAVENOUS; SUBCUTANEOUS at 13:24

## 2022-03-03 NOTE — ASU PATIENT PROFILE, ADULT - FALL HARM RISK - UNIVERSAL INTERVENTIONS
Bed in lowest position, wheels locked, appropriate side rails in place/Call bell, personal items and telephone in reach/Instruct patient to call for assistance before getting out of bed or chair/Non-slip footwear when patient is out of bed/[place] to call system/Physically safe environment - no spills, clutter or unnecessary equipment/Purposeful Proactive Rounding/Room/bathroom lighting operational, light cord in reach

## 2022-03-03 NOTE — ASU DISCHARGE PLAN (ADULT/PEDIATRIC) - NS MD DC FALL RISK RISK
For information on Fall & Injury Prevention, visit: [email or web address] OR  [email or web address] OR  https://www.cdc.gov/steadi/patient.html

## 2022-03-03 NOTE — ASU DISCHARGE PLAN (ADULT/PEDIATRIC) - NS DC INTERPRETER YES NO
Lyndsey was notified of SGPT results drawn 02/09/2018.  She veralized understanding and will call with concerns.   No Yes

## 2022-03-03 NOTE — ASU PREOP CHECKLIST - NS PREOP CHK MONITOR ANESTHESIA CONSENT
Bedside and Verbal shift change report given to Thor Lopez RN (oncoming nurse) by Edel Riley RN (offgoing nurse). Report included the following information ED Summary, MAR and Recent Results. done

## 2022-03-03 NOTE — ASU DISCHARGE PLAN (ADULT/PEDIATRIC) - ASU DC SPECIAL INSTRUCTIONSFT
STENT: You may have an internal stents (a hollow tube that runs from the kidney to your bladder) after your procedure, which helps urine drain from the kidney to your bladder. Some patients experience urinary frequency, burning, or even back pain (especially with urination). These sensations will gradually get better. Increasing your fluid intake can also improve these symptoms. While the stent is in place, your urine may continue to be bloody. This stent is temporary and must be removed by your urologist as an outpatient with in 3 months unless otherwise specified. Please follow up at the Urology office next Friday, March 11 for stent removal in the office.   GENERAL: It is common to have blood in your urine after your procedure. It may be pink or even red; inform your doctor if you have a significant amount of clot in the urine or if you are unable to void at all. The urine may clear and then become bloody again especially as you are more physically active.  BATHING: You may shower or bathe.  DIET: You may resume your regular diet and regular medication regimen.  PAIN: You may take Tylenol (acetaminophen) 650-975mg and/or Motrin (ibuprofen) 400-600mg, both available over the counter, for pain every 6 hours as needed. Do not exceed 4000mg of Tylenol (acetaminophen) daily. You may alternate these medications such that you take one or the other every 3 hours for around the clock pain coverage. The following medications have been sent to your pharmacy for stent related discomfort: Flomax (tamsulosin) 0.4mg at bedtime until stent removed, and Pyridium (phenasopyridine) 100mg every 8 hours as needed for kidney/bladder discomfort for max 3 days (Pyridium will make your urine orange).  ANTIBIOTICS: You were given a prescription for an antibiotic, please take this medication as instructed and be sure to complete the entire course.  STOOL SOFTENERS: Do not allow yourself to become constipated as straining may cause bleeding. Take stool softeners or a laxative (ex. Miralax, Colace, Senokot, ExLax, etc), available over the counter, if needed.  ACTIVITY: No heavy lifting or strenuous exercise until you are evaluated at your post-operative appointment. Otherwise, you may return to your usual level of physical activity.  ANTICOAGULATION: If you are taking any blood thinning medications, please discuss with your urologist prior to restarting these medications unless otherwise specified.  FOLLOW-UP: If you did not already schedule your post-operative appointment, please call your urologist to schedule and follow-up appointment.  CALL YOUR UROLOGIST IF: You have any bleeding that does not stop, inability to void >8 hours, fever over 100.4 F, chills, persistent nausea/vomiting, changes in your incision concerning for infection, or if your pain is not controlled on your discharge pain medications.

## 2022-03-03 NOTE — ASU PATIENT PROFILE, ADULT - NSTOBACCO TYPE_GEN_A_CORE_RD
Progress Note - Sumit Sickle 1954, 77 y o  male MRN: [id number]    Unit/Bed#: [facility] 821-01 Encounter: [id number]    Primary Care Provider: Katya Swift MD   Date and time admitted to hospital: 6/25/2020 11:43 AM        * Acute respiratory failure with hypoxia due to Pneumonia due to COVID-19 virus  Assessment & Plan  · Pt presents with 1 week symptoms at [facility]  · Currently receiving COVID moderate treatment algorithm  · Continue ceftriaxone and doxycycline, given elevated procalcitonin, continue to trend  · Continue vitamin-C, vitamin-D, zinc supplementation  · Monitor temps, WBC, procalcitonin, inflammatory markers  · Awaiting L 6    Sepsis due to COVID-19 pneumonia [facility])  Assessment & Plan  · Patient presenting with tachycardia, tachypnea, hypoxia and confirm COVID-19 pneumonia  · Elevated procalcitonin  Continue antibiotics as above  · Blood cultures negative so far    CKD (chronic kidney disease)  Assessment & Plan  · Baseline creatinine around 1 2   · Management as above    Chronic obstructive pulmonary disease, unspecified ([facility] [address] )  Assessment & Plan  · Stable without any wheezing  · Continue home inhalers  Type 2 acute myocardial infarction [facility])  Assessment & Plan  · Likely due to acute respiratory failure from COVID-19 pneumonia  · Patient denies any chest pain or shortness of breath  · Will continue warfarin for anticoagulation given his lack of symptoms at this time  · Outpatient cardiac evaluation when stable    Acute renal failure (ARF) ([facility])  Assessment & Plan  · Acute kidney failure likely due to Sepsis evidenced by Cr 1 73 (baseline Cr 1 2)  · Creatinine slightly increased today, possibly due to fever, encouraged patient to increase fluid intake  · Avoiding IVF due to his history of CHF  · Avoid nephrotoxins, hypotension    · Monitor BMP    Chronic diastolic congestive heart failure [facility])  Assessment & Plan  Wt Readings from Last 3 Encounters:   06/28/20 (!) 154 kg (339 lb 11 2 oz)   06/15/20 (!) 155 kg (341 lb 4 4 oz)   20 (!) 154 kg (340 lb 9 8 oz)     · Volume status is adequate  · Monitor intake and output  · Monitor daily weights  · Will hold torsemide tomorrow due to increased creatinine    Chronic atrial fibrillation (HCC)  Assessment & Plan  · Rate controlled with metoprolol  · Warfarin for anticoagulation  · INR is increasing, likely secondary to antibiotics, decrease warfarin to 5 mg    Essential hypertension  Assessment & Plan  · Well controlled  · Continue metoprolol   · Holding torsemide tomorrow due to increased creatinine today    Diabetes mellitus type 2, uncontrolled ([facility] [address] )  Assessment & Plan  Lab Results   Component Value Date    HGBA1C 8 8 (H) 2020       Recent Labs     20  2051 20  0746 20  1114 20  1637   POCGLU 214* 110 69 162*       Blood Sugar Average: Last 72 hrs:  (P) 276 5   · Uncontrolled due to IV steroid use  · Improved today  · Diabetic diet  · Will decrease insulin today as the patient had some mild hypoglycemia  · Continue sliding scale coverage        VTE Pharmacologic Prophylaxis:   Pharmacologic: Heparin  Mechanical VTE Prophylaxis in Place: Yes    Patient Centered Rounds: I have performed bedside rounds with nursing staff today  Education and Discussions with Family / Patient:  Patient, plan of care    Time Spent for Care: 20 minutes  More than 50% of total time spent on counseling and coordination of care as described above  Current Length of Stay: 4 day(s)    Current Patient Status: Inpatient   Certification Statement: The patient will continue to require additional inpatient hospital stay due to Increased creatinine, COVID treatment, monitor temps    Discharge Plan:  [facility] when stable    Code Status: Level 1 - Full Code      Subjective:   Denies any acute complaints      Objective:     Vitals:   Temp (24hrs), Av 2 °F (37 3 °C), Min:97 9 °F (36 6 °C), Max:100 4 °F (38 °C)    Temp:  [97 9 °F (36 6 °C)-100 4 °F (38 °C)] 97 9 °F (36 6 °C)  HR:  [51-72] 51  Resp:  [18-20] 18  BP: (141-154)/(52-80) 144/80  SpO2:  [90 %-96 %] 96 %  Body mass index is 42 46 kg/m²  Input and Output Summary (last 24 hours): Intake/Output Summary (Last 24 hours) at 6/29/2020 1834  Last data filed at 6/29/2020 1413  Gross per 24 hour   Intake 600 ml   Output 1500 ml   Net -900 ml       Physical Exam:     Physical Exam   Constitutional: He is oriented to person, place, and time  Obese elderly male, sitting in bed   HENT:   Head: Normocephalic and atraumatic  Eyes: EOM are normal    Neck: Neck supple  Cardiovascular: Normal rate  Pulmonary/Chest: Effort normal  He has no wheezes  He has no rales  Abdominal: Soft  Musculoskeletal:   Status post left AKA   Neurological: He is alert and oriented to person, place, and time  No cranial nerve deficit  Skin:   Multiple skin tears         Additional Data:     Labs:    Results from last 7 days   Lab Units 06/29/20  0558   WBC Thousand/uL 6 57   HEMOGLOBIN g/dL 11 3*   HEMATOCRIT % 37 3   PLATELETS Thousands/uL 209   NEUTROS PCT % 91*   LYMPHS PCT % 5*   MONOS PCT % 3*   EOS PCT % 0     Results from last 7 days   Lab Units 06/29/20  0558  06/26/20  0950   SODIUM mmol/L 143   < > 136   POTASSIUM mmol/L 3 8   < > 5 1   CHLORIDE mmol/L 105   < > 102   CO2 mmol/L 29   < > 25   BUN mg/dL 74*   < > 51*   CREATININE mg/dL 1 54*   < > 1 86*   ANION GAP mmol/L 9   < > 9   CALCIUM mg/dL 9 1   < > 9 0   ALBUMIN g/dL  --   --  2 8*   TOTAL BILIRUBIN mg/dL  --   --  0 76   ALK PHOS U/L  --   --  150*   ALT U/L  --   --  28   AST U/L  --   --  61*   GLUCOSE RANDOM mg/dL 117   < > 448*    < > = values in this interval not displayed       Results from last 7 days   Lab Units 06/29/20  0558   INR  2 74*     Results from last 7 days   Lab Units 06/29/20  1637 06/29/20  1114 06/29/20  0746 06/28/20  2051 06/28/20  1618 06/28/20  1100 06/28/20  0802 06/27/20  2116 06/27/20  1617 06/27/20  1102 06/27/20  0752 06/26/20  2100   POC GLUCOSE mg/dl 162* 69 110 214* 209* 207* 226* 262* 298* 427* 285* 333*     Results from last 7 days   Lab Units 06/25/20  1207 06/25/20  0650 06/25/20   HEMOGLOBIN A1C % 8 8* 9 3* 9 3     Results from last 7 days   Lab Units 06/29/20  0558 06/28/20  0539 06/27/20  0505 06/26/20  1454 06/26/20  0950 06/25/20  2302 06/25/20  1718  06/25/20  1207   LACTIC ACID mmol/L  --   --   --   --   --  1 4 1 6  --  2 1*   PROCALCITONIN ng/ml 0 69* 1 97* 3 80* 3 59* 4 42*  --   --    < > 1 42*    < > = values in this interval not displayed  * I Have Reviewed All Lab Data Listed Above  * Additional Pertinent Lab Tests Reviewed: All Labs Within Last 24 Hours Reviewed      Recent Cultures (last 7 days):     Results from last 7 days   Lab Units 06/25/20  1206   BLOOD CULTURE  No Growth After 4 Days  No Growth After 4 Days         Last 24 Hours Medication List:     Current Facility-Administered Medications:  acetaminophen 650 mg Oral Q6H PRN Anupam Mayo MD    acyclovir 400 mg Oral After Zion Schmitz MD    albuterol 2 puff Inhalation Q6H PRN Anupam Mayo MD    ascorbic acid 1,000 mg Oral Q12H [address] Anupam Mayo MD    atorvastatin 40 mg Oral After Zion Schmitz MD    bisacodyl 10 mg Rectal Daily PRN Anupam Mayo MD    cefTRIAXone 1,000 mg Intravenous Q24H Anupam Mayo MD Last Rate: Stopped (06/29/20 1803)   cholecalciferol 2,000 Units Oral Daily Anupam Mayo MD    clotrimazole  Topical Q12H [address] Anupam Mayo MD    doxycycline hyclate 100 mg Oral Q12H Anupam Mayo MD    fluticasone-vilanterol 1 puff Inhalation Daily Anupam Mayo MD    insulin glargine 35 Units Subcutaneous HS Lencho Slade MD    insulin lispro 1-5 Units Subcutaneous HS Sisi Lawrence PA-C    [START ON 6/30/2020] insulin lispro 12 Units Subcutaneous TID With Meals Unknown MD [place]    insulin lispro 2-12 Units Subcutaneous TID AC Kendra Rosen MD    magnesium hydroxide 30 mL Oral Daily PRN Kendra Rosen MD    melatonin 6 mg Oral HS Tuesday M NICOLA Heath    methylPREDNISolone sodium succinate 125 mg Intravenous Daily Kendra Rosen MD    metoprolol tartrate 25 mg Oral Q12H MD Michael Shankar ANTIFUNGAL 1 application Topical TID Manny Fallon MD    zinc sulfate 220 mg Oral Daily Kendra Rosen MD    Followed by        Waldemar Pacheco ON 7/2/2020] multivitamin-minerals 1 tablet Oral Daily MD Waldemar Shankar ON 7/1/2020] torsemide 20 mg Oral BID (diuretic) Manny Fallon MD    warfarin 5 mg Oral Daily (warfarin) Manny Fallon MD         Today, Patient Was Seen By: Bhavani Crowley MD    ** Please Note: Dictation voice to text software may have been used in the creation of this document   ** Cigarettes

## 2022-03-03 NOTE — BRIEF OPERATIVE NOTE - OPERATION/FINDINGS
Cystoscopy, bilateral ureteroscopy and stone extraction   Placement of bilateral [id number] ureteral stents

## 2022-03-03 NOTE — ASU DISCHARGE PLAN (ADULT/PEDIATRIC) - CARE PROVIDER_API CALL
Urology Clinic,   [address] [facility]  [address], [address]  [place], [place]  Phone: [phone]  Fax: (   )    -  Scheduled Appointment: 03/11/2022

## 2022-03-03 NOTE — ASU DISCHARGE PLAN (ADULT/PEDIATRIC) - PROVIDER TOKENS
FREE:[LAST:[Urology Clinic],PHONE:[phone]],FAX:[(   )    -],ADDRESS:[[address] [place] [address]  [address], [address]  [place], [place]],SCHEDULEDAPPT:[03/11/2022]]

## 2022-03-09 LAB
NIDUS STONE QN: SIGNIFICANT CHANGE UP
NIDUS STONE QN: SIGNIFICANT CHANGE UP

## 2022-03-11 ENCOUNTER — OUTPATIENT (OUTPATIENT)
Dept: OUTPATIENT SERVICES | Facility: HOSPITAL | Age: 47
LOS: 1 days | End: 2022-03-11
Payer: MEDICAID

## 2022-03-11 ENCOUNTER — APPOINTMENT (OUTPATIENT)
Dept: UROLOGY | Facility: CLINIC | Age: 47
End: 2022-03-11
Payer: COMMERCIAL

## 2022-03-11 VITALS
DIASTOLIC BLOOD PRESSURE: 78 MMHG | SYSTOLIC BLOOD PRESSURE: 117 MMHG | OXYGEN SATURATION: 99 % | BODY MASS INDEX: 28.99 KG/M2 | HEART RATE: 83 BPM | WEIGHT: 174 LBS | RESPIRATION RATE: 18 BRPM | HEIGHT: 65 IN | TEMPERATURE: 97.4 F

## 2022-03-11 DIAGNOSIS — Z00.00 ENCOUNTER FOR GENERAL ADULT MEDICAL EXAMINATION WITHOUT ABNORMAL FINDINGS: ICD-10-CM

## 2022-03-11 LAB — SURGICAL PATHOLOGY STUDY: SIGNIFICANT CHANGE UP

## 2022-03-11 PROCEDURE — G0463: CPT

## 2022-03-11 PROCEDURE — 52315 CYSTOSCOPY AND TREATMENT: CPT

## 2022-03-12 LAB
APPEARANCE: CLEAR
BACTERIA UR CULT: NORMAL
BACTERIA UR CULT: NORMAL
BACTERIA: NEGATIVE
BILIRUBIN URINE: NEGATIVE
BLOOD URINE: ABNORMAL
COLOR: NORMAL
GLUCOSE QUALITATIVE U: NEGATIVE
HYALINE CASTS: 0 /LPF
KETONES URINE: NEGATIVE
LEUKOCYTE ESTERASE URINE: NEGATIVE
MICROSCOPIC-UA: NORMAL
NIDUS STONE QN: NORMAL
NITRITE URINE: NEGATIVE
PH URINE: 6
PROTEIN URINE: NEGATIVE
RED BLOOD CELLS URINE: 84 /HPF
SPECIFIC GRAVITY URINE: 1.01
SQUAMOUS EPITHELIAL CELLS: 0 /HPF
UROBILINOGEN URINE: NORMAL
WHITE BLOOD CELLS URINE: 1 /HPF

## 2022-03-14 DIAGNOSIS — N20.0 CALCULUS OF KIDNEY: ICD-10-CM

## 2022-03-15 ENCOUNTER — APPOINTMENT (OUTPATIENT)
Dept: GASTROENTEROLOGY | Facility: HOSPITAL | Age: 47
End: 2022-03-15
Payer: COMMERCIAL

## 2022-03-15 ENCOUNTER — OUTPATIENT (OUTPATIENT)
Dept: OUTPATIENT SERVICES | Facility: HOSPITAL | Age: 47
LOS: 1 days | End: 2022-03-15
Payer: SELF-PAY

## 2022-03-15 VITALS
TEMPERATURE: 96.3 F | BODY MASS INDEX: 28.49 KG/M2 | DIASTOLIC BLOOD PRESSURE: 73 MMHG | HEART RATE: 73 BPM | HEIGHT: 65 IN | WEIGHT: 171 LBS | SYSTOLIC BLOOD PRESSURE: 117 MMHG

## 2022-03-15 DIAGNOSIS — R12 HEARTBURN: ICD-10-CM

## 2022-03-15 DIAGNOSIS — K31.9 DISEASE OF STOMACH AND DUODENUM, UNSPECIFIED: ICD-10-CM

## 2022-03-15 DIAGNOSIS — Z12.11 ENCOUNTER FOR SCREENING FOR MALIGNANT NEOPLASM OF COLON: ICD-10-CM

## 2022-03-15 PROCEDURE — ZZZZZ: CPT | Mod: GC

## 2022-03-15 PROCEDURE — G0463: CPT

## 2022-03-15 NOTE — ASSESSMENT
[FreeTextEntry1] : 1.    Dyspepsia: 1-2 episode per month, given reported history of stomach infection with 5 lbs unintentional wt loss, reasonable to do EGD \par 2.    Screening colonoscopy, average risk\par \par Plan:\par 1. EGD/Colonoscopy\par 2. Prep ordered\par 3. PRN anti-acids with episodes dyspepsia\par 4. RTC after procedures\par \par Jeffery Piedra\par GI Fellow\par

## 2022-03-15 NOTE — HISTORY OF PRESENT ILLNESS
[Heartburn] : denies heartburn [Nausea] : denies nausea [Vomiting] : denies vomiting [Diarrhea] : denies diarrhea [Constipation] : denies constipation [Yellow Skin Or Eyes (Jaundice)] : denies jaundice [Abdominal Pain] : denies abdominal pain [Abdominal Swelling] : denies abdominal swelling [Rectal Pain] : denies rectal pain [de-identified] : 46 yo w/ past Nephrolithiasis presenting colon cancer screening. \par \par Patient denies any nausea, vomiting, abdominal pain, blood in the stool, dysphagia, weight loss. However on occasion report having some abdominal discomfort and heartburn maybe 1-2 times per month. \par \par Never had an EGD/Colonoscopy\par \par No family history of stomach, colon, pancreas or liver cancer.

## 2022-03-15 NOTE — REASON FOR VISIT
[Initial Evaluation] : an initial evaluation [Spouse] : spouse [FreeTextEntry1] : heartburn, dyspepsia, colon cancer screening

## 2022-03-15 NOTE — END OF VISIT
[] : Fellow [FreeTextEntry3] : As modified and discussed with patient\par MD GLORIA Pulliam [facility]\par Associate Professor of Medicine\par [facility]\par

## 2022-03-16 DIAGNOSIS — Z12.11 ENCOUNTER FOR SCREENING FOR MALIGNANT NEOPLASM OF COLON: ICD-10-CM

## 2022-03-16 DIAGNOSIS — R63.4 ABNORMAL WEIGHT LOSS: ICD-10-CM

## 2022-03-16 DIAGNOSIS — R10.13 EPIGASTRIC PAIN: ICD-10-CM

## 2022-03-16 DIAGNOSIS — R12 HEARTBURN: ICD-10-CM

## 2022-04-12 ENCOUNTER — APPOINTMENT (OUTPATIENT)
Dept: UROLOGY | Facility: CLINIC | Age: 47
End: 2022-04-12

## 2022-04-15 ENCOUNTER — APPOINTMENT (OUTPATIENT)
Dept: UROLOGY | Facility: CLINIC | Age: 47
End: 2022-04-15
Payer: COMMERCIAL

## 2022-04-15 ENCOUNTER — OUTPATIENT (OUTPATIENT)
Dept: OUTPATIENT SERVICES | Facility: HOSPITAL | Age: 47
LOS: 1 days | End: 2022-04-15
Payer: SELF-PAY

## 2022-04-15 VITALS
WEIGHT: 175 LBS | DIASTOLIC BLOOD PRESSURE: 75 MMHG | HEIGHT: 65 IN | TEMPERATURE: 97.8 F | HEART RATE: 69 BPM | BODY MASS INDEX: 29.16 KG/M2 | OXYGEN SATURATION: 98 % | SYSTOLIC BLOOD PRESSURE: 109 MMHG

## 2022-04-15 DIAGNOSIS — Z00.00 ENCOUNTER FOR GENERAL ADULT MEDICAL EXAMINATION WITHOUT ABNORMAL FINDINGS: ICD-10-CM

## 2022-04-15 PROCEDURE — G0463: CPT

## 2022-04-15 PROCEDURE — 99213 OFFICE O/P EST LOW 20 MIN: CPT

## 2022-04-15 NOTE — HISTORY OF PRESENT ILLNESS
[FreeTextEntry1] : Very pleasant 46-year-old gentleman who presents for follow-up of kidney stones.  He previously underwent a cystoscopy and bilateral ureteroscopy for bilateral kidney stones.  He reports no problems after the procedure.  Bilateral ureteral stents were removed on March 11, 2022.  He presents today for discussion of management options for kidney stones.\par

## 2022-04-15 NOTE — ASSESSMENT
[FreeTextEntry1] : 46 year old male with bilateral renal calculi now s/p bilateral URS. Ureteral stents removed on 3/22/22. \par \par - Reviewed calculus analysis demonstrating 70% calcium oxalate monohydrate, 30% calcium phosphate (apatite) stone composition on the right and 70% calcium oxalate monohydrate, 20% calcium phosphate (apatite), 10% calcium oxalate dihydrate stone composition on the left\par - discussed stone prevention measures including increased hydration, decrease salt intake, decrease animal protein. \par - 24 hour urine test \par - f/up in 2-3 months for 24 hr urine results and renal US \par

## 2022-04-20 NOTE — PRE PROCEDURE NOTE - PRE PROCEDURE EVALUATION
Attending Physician:   Jeannie                        Procedure: EGD and colonoscopy     Indication for Procedure: Dyspepsia with weight loss, colon cancer screening   ________________________________________________________  PAST MEDICAL & SURGICAL HISTORY:  BPH (benign prostatic hyperplasia)    Kidney stones    No significant past surgical history      ALLERGIES:  No Known Allergies    HOME MEDICATIONS:  Tylenol 500 mg oral tablet: 2 tab(s) orally every 6 hours, As Needed    AICD/PPM: [ ] yes   [ ] no    PERTINENT LAB DATA:                      PHYSICAL EXAMINATION:    T(C): --  HR: --  BP: --  RR: --  SpO2: --    Constitutional: NAD  HEENT: PERRLA, EOMI,    Neck:  No JVD  Respiratory: CTAB/L  Cardiovascular: S1 and S2  Gastrointestinal: BS+, soft, NT/ND  Extremities: No peripheral edema  Neurological: A/O x 3, no focal deficits  Psychiatric: Normal mood, normal affect  Skin: No rashes    ASA Class: I [ ]  II [ ]  III [ ]  IV [ ]    COMMENTS:    The patient is a suitable candidate for the planned procedure unless box checked [ ]  No, explain:

## 2022-04-21 ENCOUNTER — TRANSCRIPTION ENCOUNTER (OUTPATIENT)
Age: 47
End: 2022-04-21

## 2022-04-21 ENCOUNTER — RESULT REVIEW (OUTPATIENT)
Age: 47
End: 2022-04-21

## 2022-04-21 ENCOUNTER — OUTPATIENT (OUTPATIENT)
Dept: OUTPATIENT SERVICES | Facility: HOSPITAL | Age: 47
LOS: 1 days | End: 2022-04-21
Payer: SELF-PAY

## 2022-04-21 VITALS
RESPIRATION RATE: 20 BRPM | HEART RATE: 77 BPM | DIASTOLIC BLOOD PRESSURE: 74 MMHG | SYSTOLIC BLOOD PRESSURE: 102 MMHG | OXYGEN SATURATION: 98 %

## 2022-04-21 VITALS
HEIGHT: 65 IN | RESPIRATION RATE: 16 BRPM | HEART RATE: 83 BPM | OXYGEN SATURATION: 97 % | TEMPERATURE: 98 F | SYSTOLIC BLOOD PRESSURE: 111 MMHG | WEIGHT: 171.96 LBS | DIASTOLIC BLOOD PRESSURE: 84 MMHG

## 2022-04-21 DIAGNOSIS — Z12.11 ENCOUNTER FOR SCREENING FOR MALIGNANT NEOPLASM OF COLON: ICD-10-CM

## 2022-04-21 PROCEDURE — 45378 DIAGNOSTIC COLONOSCOPY: CPT

## 2022-04-21 PROCEDURE — 88305 TISSUE EXAM BY PATHOLOGIST: CPT

## 2022-04-21 PROCEDURE — 88305 TISSUE EXAM BY PATHOLOGIST: CPT | Mod: 26

## 2022-04-21 PROCEDURE — 43239 EGD BIOPSY SINGLE/MULTIPLE: CPT

## 2022-04-21 DEVICE — NET RETRV ROT ROTH 2.5MMX230CM: Type: IMPLANTABLE DEVICE | Status: FUNCTIONAL

## 2022-04-21 DEVICE — KIT A-LINE 1LUM 20G X 12CM SAFE KIT: Type: IMPLANTABLE DEVICE | Status: FUNCTIONAL

## 2022-04-21 DEVICE — KIT CVC 2LUM MAC 9FR CHG: Type: IMPLANTABLE DEVICE | Status: FUNCTIONAL

## 2022-04-21 DEVICE — CATH THERMODIL PACE 7.5FR: Type: IMPLANTABLE DEVICE | Status: FUNCTIONAL

## 2022-04-21 NOTE — PRE-ANESTHESIA EVALUATION ADULT - NSANTHAIRWAYFT_ENT_ALL_CORE
FROM FROM  TM distance <2 finger breadths  Neck circumference > 17cm  Interincisor distance <2 finger breadths

## 2022-04-21 NOTE — ASU PATIENT PROFILE, ADULT - FALL HARM RISK - UNIVERSAL INTERVENTIONS
Bed in lowest position, wheels locked, appropriate side rails in place/Call bell, personal items and telephone in reach/Instruct patient to call for assistance before getting out of bed or chair/Non-slip footwear when patient is out of bed/[place] to call system/Physically safe environment - no spills, clutter or unnecessary equipment/Purposeful Proactive Rounding/Room/bathroom lighting operational, light cord in reach

## 2022-04-21 NOTE — ASU DISCHARGE PLAN (ADULT/PEDIATRIC) - NS MD DC FALL RISK RISK
For information on Fall & Injury Prevention, visit: [email or web address] OR  [email or web address] OR  https://www.cdc.gov/steadi/patient.html

## 2022-04-22 DIAGNOSIS — N20.0 CALCULUS OF KIDNEY: ICD-10-CM

## 2022-04-25 LAB — SURGICAL PATHOLOGY STUDY: SIGNIFICANT CHANGE UP

## 2022-06-03 ENCOUNTER — APPOINTMENT (OUTPATIENT)
Dept: UROLOGY | Facility: CLINIC | Age: 47
End: 2022-06-03
Payer: COMMERCIAL

## 2022-06-03 ENCOUNTER — OUTPATIENT (OUTPATIENT)
Dept: OUTPATIENT SERVICES | Facility: HOSPITAL | Age: 47
LOS: 1 days | End: 2022-06-03
Payer: SELF-PAY

## 2022-06-03 VITALS
OXYGEN SATURATION: 98 % | BODY MASS INDEX: 28.66 KG/M2 | HEART RATE: 66 BPM | HEIGHT: 65 IN | DIASTOLIC BLOOD PRESSURE: 67 MMHG | WEIGHT: 172 LBS | SYSTOLIC BLOOD PRESSURE: 108 MMHG | TEMPERATURE: 98.1 F

## 2022-06-03 DIAGNOSIS — Z00.00 ENCOUNTER FOR GENERAL ADULT MEDICAL EXAMINATION WITHOUT ABNORMAL FINDINGS: ICD-10-CM

## 2022-06-03 PROCEDURE — G0463: CPT

## 2022-06-03 PROCEDURE — 99214 OFFICE O/P EST MOD 30 MIN: CPT

## 2022-06-07 NOTE — ASSESSMENT
[FreeTextEntry1] : \par 46 year old male with bilateral renal calculi now s/p bilateral URS. Ureteral stents removed on 3/22/22.\par \par - Litholink urinalysis reviewed and discussed at length. Demonstrated low urine volume, hypocitraturia, hypercalciuria, high uric acid and high oxalate. \par - We discussed dietary modifications including increased hydration, increased citrus (adding lemon to water, oranges), decreasing salt intake (fast food, chips), decreased calcium intake (patient currently eats a bowl of ice cream every night), decrease animal protein (chicken, beef, fish) \par - We also discussed adding medications to help improve urine parameters however patient wishes to attempt dietary modification first. \par - Patient will follow up in 2 months\par - Will repeat 24 hour urine studies (not with litholink because of cost) at that time \par

## 2022-06-07 NOTE — PHYSICAL EXAM
[General Appearance - Well Developed] : well developed [General Appearance - Well Nourished] : well nourished [Abdomen Soft] : soft [Urinary Bladder Findings] : the bladder was normal on palpation [Skin Color & Pigmentation] : normal skin color and pigmentation [Heart Rate And Rhythm] : Heart rate and rhythm were normal [] : no respiratory distress [Oriented To Time, Place, And Person] : oriented to person, place, and time [Affect] : the affect was normal [Mood] : the mood was normal [Not Anxious] : not anxious [Normal Station and Gait] : the gait and station were normal for the patient's age [No Focal Deficits] : no focal deficits

## 2022-06-07 NOTE — HISTORY OF PRESENT ILLNESS
[FreeTextEntry1] : 46 year old male with bilateral renal calculi now s/p bilateral URS. Ureteral stents removed on 3/22/22.\par He presents today after having completed Litholink urinalysis. \par He reports he has been feeling well. Denies dysuria, gross hematuria, flank pain, fever and chills.\par We talked extensively about his diet and making modifications to improve urine perimeters and lower the risk of forming new stones.

## 2022-06-08 DIAGNOSIS — N20.0 CALCULUS OF KIDNEY: ICD-10-CM

## 2022-06-23 ENCOUNTER — OUTPATIENT (OUTPATIENT)
Dept: OUTPATIENT SERVICES | Facility: HOSPITAL | Age: 47
LOS: 1 days | End: 2022-06-23
Payer: SELF-PAY

## 2022-06-23 ENCOUNTER — APPOINTMENT (OUTPATIENT)
Dept: INTERNAL MEDICINE | Facility: CLINIC | Age: 47
End: 2022-06-23

## 2022-06-23 VITALS
WEIGHT: 168 LBS | RESPIRATION RATE: 18 BRPM | DIASTOLIC BLOOD PRESSURE: 75 MMHG | OXYGEN SATURATION: 98 % | BODY MASS INDEX: 27.99 KG/M2 | TEMPERATURE: 97.2 F | HEART RATE: 68 BPM | SYSTOLIC BLOOD PRESSURE: 109 MMHG | HEIGHT: 65 IN

## 2022-06-23 DIAGNOSIS — L98.9 DISORDER OF THE SKIN AND SUBCUTANEOUS TISSUE, UNSPECIFIED: ICD-10-CM

## 2022-06-23 PROCEDURE — 99214 OFFICE O/P EST MOD 30 MIN: CPT

## 2022-06-23 PROCEDURE — G0463: CPT

## 2022-06-27 ENCOUNTER — OUTPATIENT (OUTPATIENT)
Dept: OUTPATIENT SERVICES | Facility: HOSPITAL | Age: 47
LOS: 1 days | End: 2022-06-27
Payer: SELF-PAY

## 2022-06-27 ENCOUNTER — NON-APPOINTMENT (OUTPATIENT)
Age: 47
End: 2022-06-27

## 2022-06-27 DIAGNOSIS — Z00.00 ENCOUNTER FOR GENERAL ADULT MEDICAL EXAMINATION WITHOUT ABNORMAL FINDINGS: ICD-10-CM

## 2022-06-27 PROCEDURE — 80061 LIPID PANEL: CPT

## 2022-06-27 PROCEDURE — 80053 COMPREHEN METABOLIC PANEL: CPT

## 2022-06-27 PROCEDURE — 82306 VITAMIN D 25 HYDROXY: CPT

## 2022-06-30 NOTE — HISTORY OF PRESENT ILLNESS
[[employer] ] : provided by [employer]   [de-identified] : 46 y.o MS w/pmhx of renal stone here to FOLLOW UP \par \par has never follow up with pcp \par \par denies of any HA/CP/SOB/Palpitation\par \par \par had recurrent uti was in ED and had renal stone \par  saw uro \par had US renal and planning for CT per uro \par current no uro symptoms\par \par \par interval hx 06/23/2022\par \par SAW URO AND CT RENAL DONE \par \par SAW GI \par \par \par ROS\par \par Constitutional:  no fever and no chills. \par Eyes:  no discharge and no pain. \par HEENT:  no earache and no hearing loss. \par Cardiovascular:  no chest pain, no palpitations and no leg claudication. \par Respiratory:  no shortness of breath, no wheezing and no cough. \par Gastrointestinal:  no abdominal pain, no nausea and no constipation. \par Genitourinary:  no dysuria and no incontinence. \par Musculoskeletal:  no joint pain, no joint stiffness and no joint swelling. \par Integumentary:  no itching and no mole changes. \par Neurological:  no headache, no dizziness and no fainting. \par Psychiatric:  not suicidal, no insomnia, no anxiety and no depression. \par \par Physical Exam\par \par Constitutional:   no acute distress, well nourished, well developed and well-appearing. \par Eyes:  normal sclera/conjunctiva, pupils equal round and reactive to light and extraocular movements intact. \par ENT:  the outer ears and nose were normal in appearance and the oropharynx was normal. \par Neck:  supple, no lymphadenopathy and the thyroid was normal and there were no nodules present. \par Pulmonary:  no respiratory distress, lungs were clear to auscultation bilaterally, no accessory muscle use. \par Cardiac:  normal rate, with a regular rhythm, normal S1 and S2 and no murmur heard. \par Vascular:  there was no peripheral edema. \par Abdomen:  abdomen soft, non-tender, non-distended, no abdominal mass palpated, no HSM and normal bowel sounds. \par Lymphatic:  no posterior cervical lymphadenopathy, no anterior cervical lymphadenopathy. \par Back:  no CVA tenderness and no spinal tenderness. \par Musculoskeletal: no joint swelling and grossly normal strength/tone. \par Skin:  no rash. \par Neurology:  normal gait, coordination grossly intact, no focal deficits and deep tendon reflexes were 2+ and symmetric. \par Psychiatric:  the affect was normal, oriented to person, place, and time and insight and judgment were intact.\par  [Interpreters_IDNumber] : [id number] [TWNoteComboBox1] : Malaysian

## 2022-06-30 NOTE — HEALTH RISK ASSESSMENT
Quality 130: Documentation Of Current Medications In The Medical Record: Current Medications Documented Detail Level: Detailed [No] : In the past 12 months have you used drugs other than those required for medical reasons? No Quality 111:Pneumonia Vaccination Status For Older Adults: Pneumococcal Vaccination not Administered or Previously Received, Reason not Otherwise Specified [0] : 2) Feeling down, depressed, or hopeless: Not at all (0) Quality 110: Preventive Care And Screening: Influenza Immunization: Influenza Immunization Ordered or Recommended, but not Administered due to system reason [Patient reported colonoscopy was normal] : Patient reported colonoscopy was normal [With Family] : lives with family [Employed] : employed [Sexually Active] : sexually active [Feels Safe at Home] : Feels safe at home [Fully functional (bathing, dressing, toileting, transferring, walking, feeding)] : Fully functional (bathing, dressing, toileting, transferring, walking, feeding) [Fully functional (using the telephone, shopping, preparing meals, housekeeping, doing laundry, using] : Fully functional and needs no help or supervision to perform IADLs (using the telephone, shopping, preparing meals, housekeeping, doing laundry, using transportation, managing medications and managing finances) [de-identified] : quit smoking 15 yrs; 1cigerates a day for 20 yrs; [[id number]] : 0 [High Risk Behavior] : no high risk behavior [ColonoscopyDate] : 04/2022 [ColonoscopyComments] : REPEAT IN 10 YRS ( 2032) [FreeTextEntry2] : construction;

## 2022-06-30 NOTE — ASSESSMENT
[FreeTextEntry1] : s/p covid19 shots x2 \par recommend boost shot; \par \par \par defer flu shot to next visit \par labs as ordered \par \par HLD: \par ASCVD2.6\par HEALTHY EATING AND EXERCISE;\par 6 MONTHS FOLLOW UP \par \par s/p ct per uro;\par WILL SEE AGAIN IN 2 MONTHS \par \par  cxr WNL \par \par S/P colonoscopy AND EGD\par  DUE FOR REPEAT EGD IN 1 YR DUE TO Intestina METAPLASIA ( 2023)\par DUE FOR REPEAT Colonoscopy IN 10 YR ( 2032 )\par \par \par S/P TREATMENT FOR H.PYLORI;\par RECHECK STOOL TEST \par 6 MONTHS FOLLOW UP  FOR cpe

## 2022-07-01 DIAGNOSIS — M54.50 LOW BACK PAIN, UNSPECIFIED: ICD-10-CM

## 2022-07-01 DIAGNOSIS — A04.8 OTHER SPECIFIED BACTERIAL INTESTINAL INFECTIONS: ICD-10-CM

## 2022-07-01 DIAGNOSIS — Z00.00 ENCOUNTER FOR GENERAL ADULT MEDICAL EXAMINATION WITHOUT ABNORMAL FINDINGS: ICD-10-CM

## 2022-07-01 DIAGNOSIS — L98.9 DISORDER OF THE SKIN AND SUBCUTANEOUS TISSUE, UNSPECIFIED: ICD-10-CM

## 2022-07-01 DIAGNOSIS — E55.9 VITAMIN D DEFICIENCY, UNSPECIFIED: ICD-10-CM

## 2022-07-01 DIAGNOSIS — E78.5 HYPERLIPIDEMIA, UNSPECIFIED: ICD-10-CM

## 2022-07-05 ENCOUNTER — APPOINTMENT (OUTPATIENT)
Dept: GASTROENTEROLOGY | Facility: HOSPITAL | Age: 47
End: 2022-07-05

## 2022-07-12 LAB
25(OH)D3 SERPL-MCNC: 22.9 NG/ML
ALBUMIN SERPL ELPH-MCNC: 4.1 G/DL
ALP BLD-CCNC: 127 U/L
ALT SERPL-CCNC: 8 U/L
ANION GAP SERPL CALC-SCNC: 11 MMOL/L
AST SERPL-CCNC: 28 U/L
BILIRUB SERPL-MCNC: 0.2 MG/DL
BUN SERPL-MCNC: 18 MG/DL
CALCIUM SERPL-MCNC: 8.8 MG/DL
CHLORIDE SERPL-SCNC: 106 MMOL/L
CHOLEST SERPL-MCNC: 205 MG/DL
CO2 SERPL-SCNC: 21 MMOL/L
CREAT SERPL-MCNC: 0.68 MG/DL
EGFR: 115 ML/MIN/1.73M2
GLUCOSE SERPL-MCNC: 99 MG/DL
HDLC SERPL-MCNC: 37 MG/DL
LDLC SERPL CALC-MCNC: NORMAL MG/DL
NONHDLC SERPL-MCNC: 168 MG/DL
POTASSIUM SERPL-SCNC: 4.2 MMOL/L
PROT SERPL-MCNC: 7.2 G/DL
SODIUM SERPL-SCNC: 137 MMOL/L
TRIGL SERPL-MCNC: 859 MG/DL

## 2022-08-05 ENCOUNTER — OUTPATIENT (OUTPATIENT)
Dept: OUTPATIENT SERVICES | Facility: HOSPITAL | Age: 47
LOS: 1 days | End: 2022-08-05
Payer: SELF-PAY

## 2022-08-05 ENCOUNTER — APPOINTMENT (OUTPATIENT)
Dept: UROLOGY | Facility: CLINIC | Age: 47
End: 2022-08-05

## 2022-08-05 VITALS
SYSTOLIC BLOOD PRESSURE: 110 MMHG | HEART RATE: 74 BPM | WEIGHT: 169 LBS | DIASTOLIC BLOOD PRESSURE: 74 MMHG | TEMPERATURE: 97.5 F | BODY MASS INDEX: 28.16 KG/M2 | HEIGHT: 65 IN | OXYGEN SATURATION: 99 %

## 2022-08-05 DIAGNOSIS — Z00.00 ENCOUNTER FOR GENERAL ADULT MEDICAL EXAMINATION WITHOUT ABNORMAL FINDINGS: ICD-10-CM

## 2022-08-05 PROCEDURE — 99213 OFFICE O/P EST LOW 20 MIN: CPT

## 2022-08-05 PROCEDURE — G0463: CPT

## 2022-08-05 NOTE — PHYSICAL EXAM
[General Appearance - Well Developed] : well developed [General Appearance - Well Nourished] : well nourished [Normal Appearance] : normal appearance [Well Groomed] : well groomed [General Appearance - In No Acute Distress] : no acute distress [Abdomen Mass (___ Cm)] : no abdominal mass palpated [Costovertebral Angle Tenderness] : no ~M costovertebral angle tenderness [] : no respiratory distress [Exaggerated Use Of Accessory Muscles For Inspiration] : no accessory muscle use

## 2022-08-05 NOTE — ASSESSMENT
[FreeTextEntry1] : 76 year old male with bilateral renal calculi now s/p bilateral URS. Ureteral stents removed on 3/22/22.\par \par Plan\par Renal bladder ultrasound ordered\par Reviewed [facility] lab results for 24 hour study, discussed with patient\par RTC in 1 month with ultrasound results, will consider repeat 24 hour urine test that is cheaper at that time.

## 2022-08-05 NOTE — HISTORY OF PRESENT ILLNESS
[FreeTextEntry1] : 46 year old male with bilateral renal calculi now s/p bilateral URS. Ureteral stents removed on 3/22/22.\par He presents today for follow up 2 months after litholink. Patient reports that the litholink test was very expensive, he does not wish to have another one as he is unable to pay such a high cost. \par He reports he has been feeling well. Denies dysuria, gross hematuria, fever and chills, is having some lower back pain with excessive motion but does not resemble stone related pain. \par Discussed again extensively about his diet and making modifications to improve urine perimeters and lower the risk of forming new stones. \par \par 8/5: Ordered renal bladder ultrasound for nephrolithiasis surveillance. Patient does not want to do another [facility] evaluation due to the prohibitive cost of the examination.\par  \par

## 2022-08-09 DIAGNOSIS — Z87.442 PERSONAL HISTORY OF URINARY CALCULI: ICD-10-CM

## 2022-08-24 ENCOUNTER — APPOINTMENT (OUTPATIENT)
Dept: ULTRASOUND IMAGING | Facility: HOSPITAL | Age: 47
End: 2022-08-24

## 2022-08-24 ENCOUNTER — OUTPATIENT (OUTPATIENT)
Dept: OUTPATIENT SERVICES | Facility: HOSPITAL | Age: 47
LOS: 1 days | End: 2022-08-24
Payer: SELF-PAY

## 2022-08-24 DIAGNOSIS — N20.0 CALCULUS OF KIDNEY: ICD-10-CM

## 2022-08-24 PROCEDURE — 76770 US EXAM ABDO BACK WALL COMP: CPT

## 2022-08-24 PROCEDURE — 76770 US EXAM ABDO BACK WALL COMP: CPT | Mod: 26

## 2022-09-09 ENCOUNTER — APPOINTMENT (OUTPATIENT)
Dept: UROLOGY | Facility: CLINIC | Age: 47
End: 2022-09-09

## 2022-09-09 ENCOUNTER — OUTPATIENT (OUTPATIENT)
Dept: OUTPATIENT SERVICES | Facility: HOSPITAL | Age: 47
LOS: 1 days | End: 2022-09-09
Payer: SELF-PAY

## 2022-09-09 VITALS
BODY MASS INDEX: 27.82 KG/M2 | HEIGHT: 65 IN | SYSTOLIC BLOOD PRESSURE: 102 MMHG | HEART RATE: 71 BPM | TEMPERATURE: 98.1 F | OXYGEN SATURATION: 98 % | DIASTOLIC BLOOD PRESSURE: 66 MMHG | RESPIRATION RATE: 18 BRPM | WEIGHT: 167 LBS

## 2022-09-09 DIAGNOSIS — Z00.00 ENCOUNTER FOR GENERAL ADULT MEDICAL EXAMINATION WITHOUT ABNORMAL FINDINGS: ICD-10-CM

## 2022-09-09 PROCEDURE — 99214 OFFICE O/P EST MOD 30 MIN: CPT

## 2022-09-09 PROCEDURE — G0463: CPT

## 2022-09-10 NOTE — HISTORY OF PRESENT ILLNESS
[FreeTextEntry1] : 46 year old male with bilateral renal calculi now s/p bilateral URS. Ureteral stents removed on 3/22/22.\par He presents today for follow up after surveillance ultrasound. Patient reports that the litholink test was very expensive, he does not wish to have another one as he is unable to pay such a high cost. \par \par 8/5: Ordered renal bladder ultrasound for nephrolithiasis surveillance. Patient does not want to do another [facility] evaluation due to the prohibitive cost of the examination.\par \par 9/9: patient presents for follow up to discuss ultrasound results and for nephrolithiasis management. Ultrasound reviewed , showing 4mm nonobstructing kidney stones bilaterally. Discussed results with patient. he reports continued symptoms of vague lower back pain that appears musculoskeletal. Denies dysuria gross hematuria, fever and chills. Overall feeling well. \par  \par

## 2022-09-10 NOTE — ASSESSMENT
[FreeTextEntry1] : 76 year old male with bilateral renal calculi now s/p bilateral URS. Ureteral stents removed on 3/22/22.Stone analysis March, 2022 showing 70% CaOxalate, 30% CaPhos\par \par Plan\par - Renal bladder ultrasound images reviewed, bilateral nonobstructing small subcentimenter (measuring 0.4cm) present bilaterally. \par - Discussed stone prevention with increase water intake, decreased salt, and specific dietary changes to prevent calcium oxalate stones. \par -RTC in 6 months for repeat ultrasound for stone surveillance. \par

## 2022-09-12 DIAGNOSIS — Z87.442 PERSONAL HISTORY OF URINARY CALCULI: ICD-10-CM

## 2022-10-25 ENCOUNTER — APPOINTMENT (OUTPATIENT)
Dept: INTERNAL MEDICINE | Facility: CLINIC | Age: 47
End: 2022-10-25

## 2022-10-25 VITALS
TEMPERATURE: 97.3 F | OXYGEN SATURATION: 99 % | SYSTOLIC BLOOD PRESSURE: 118 MMHG | WEIGHT: 168 LBS | HEIGHT: 65 IN | DIASTOLIC BLOOD PRESSURE: 74 MMHG | BODY MASS INDEX: 27.99 KG/M2 | HEART RATE: 74 BPM

## 2022-10-25 DIAGNOSIS — E78.1 PURE HYPERGLYCERIDEMIA: ICD-10-CM

## 2022-10-25 DIAGNOSIS — Z00.00 ENCOUNTER FOR GENERAL ADULT MEDICAL EXAMINATION W/OUT ABNORMAL FINDINGS: ICD-10-CM

## 2022-10-25 PROCEDURE — G0008: CPT

## 2022-10-25 PROCEDURE — 99213 OFFICE O/P EST LOW 20 MIN: CPT | Mod: 25

## 2022-10-25 PROCEDURE — 90686 IIV4 VACC NO PRSV 0.5 ML IM: CPT

## 2022-10-25 NOTE — COUNSELING
[de-identified] : low TG diet [None] : None [Good understanding] : Patient has a good understanding of lifestyle changes and steps needed to achieve self management goal

## 2022-10-25 NOTE — ASSESSMENT
[FreeTextEntry1] : 45 yo with high TG,olow HDL,on Fenofibrate 48 mg.\par asymptomatic.\par labs today,us abd

## 2022-10-25 NOTE — HISTORY OF PRESENT ILLNESS
[FreeTextEntry1] : f/u high TG>800 [de-identified] : 47 yo asymptomatic was found of high TG>800,low HDL 34,.on low dose Fenofibrate.\par denies Sx's of pancreatitis\par PMH:ETOH ,quit 17 y ago.

## 2022-10-30 ENCOUNTER — NON-APPOINTMENT (OUTPATIENT)
Age: 47
End: 2022-10-30

## 2022-10-30 LAB
ALBUMIN SERPL ELPH-MCNC: 4.4 G/DL
ALP BLD-CCNC: 122 U/L
ALT SERPL-CCNC: 38 U/L
ANION GAP SERPL CALC-SCNC: 12 MMOL/L
APPEARANCE: CLEAR
AST SERPL-CCNC: 25 U/L
BASOPHILS # BLD AUTO: 0.03 K/UL
BASOPHILS NFR BLD AUTO: 0.6 %
BILIRUB SERPL-MCNC: 0.3 MG/DL
BILIRUBIN URINE: NEGATIVE
BLOOD URINE: NEGATIVE
BUN SERPL-MCNC: 16 MG/DL
CALCIUM SERPL-MCNC: 9.5 MG/DL
CHLORIDE SERPL-SCNC: 104 MMOL/L
CHOLEST SERPL-MCNC: 163 MG/DL
CO2 SERPL-SCNC: 25 MMOL/L
COLOR: NORMAL
CREAT SERPL-MCNC: 0.64 MG/DL
EGFR: 117 ML/MIN/1.73M2
EOSINOPHIL # BLD AUTO: 0.1 K/UL
EOSINOPHIL NFR BLD AUTO: 2.1 %
ESTIMATED AVERAGE GLUCOSE: 105 MG/DL
GLUCOSE QUALITATIVE U: NEGATIVE
GLUCOSE SERPL-MCNC: 104 MG/DL
HBA1C MFR BLD HPLC: 5.3 %
HCT VFR BLD CALC: 45.6 %
HDLC SERPL-MCNC: 46 MG/DL
HGB BLD-MCNC: 14.8 G/DL
IMM GRANULOCYTES NFR BLD AUTO: 0.2 %
KETONES URINE: NEGATIVE
LDLC SERPL CALC-MCNC: 84 MG/DL
LEUKOCYTE ESTERASE URINE: NEGATIVE
LYMPHOCYTES # BLD AUTO: 1.36 K/UL
LYMPHOCYTES NFR BLD AUTO: 28.8 %
MAN DIFF?: NORMAL
MCHC RBC-ENTMCNC: 29.8 PG
MCHC RBC-ENTMCNC: 32.5 GM/DL
MCV RBC AUTO: 91.9 FL
MONOCYTES # BLD AUTO: 0.52 K/UL
MONOCYTES NFR BLD AUTO: 11 %
NEUTROPHILS # BLD AUTO: 2.71 K/UL
NEUTROPHILS NFR BLD AUTO: 57.3 %
NITRITE URINE: NEGATIVE
NONHDLC SERPL-MCNC: 117 MG/DL
PH URINE: 7
PLATELET # BLD AUTO: 242 K/UL
POTASSIUM SERPL-SCNC: 4.3 MMOL/L
PROT SERPL-MCNC: 7.6 G/DL
PROTEIN URINE: NEGATIVE
RBC # BLD: 4.96 M/UL
RBC # FLD: 13 %
SODIUM SERPL-SCNC: 142 MMOL/L
SPECIFIC GRAVITY URINE: 1.02
TRIGL SERPL-MCNC: 162 MG/DL
TSH SERPL-ACNC: 1.41 UIU/ML
UROBILINOGEN URINE: NORMAL
WBC # FLD AUTO: 4.73 K/UL

## 2022-11-03 ENCOUNTER — NON-APPOINTMENT (OUTPATIENT)
Age: 47
End: 2022-11-03

## 2022-11-10 ENCOUNTER — APPOINTMENT (OUTPATIENT)
Dept: ULTRASOUND IMAGING | Facility: HOSPITAL | Age: 47
End: 2022-11-10

## 2022-11-10 ENCOUNTER — OUTPATIENT (OUTPATIENT)
Dept: OUTPATIENT SERVICES | Facility: HOSPITAL | Age: 47
LOS: 1 days | End: 2022-11-10
Payer: SELF-PAY

## 2022-11-10 DIAGNOSIS — E78.1 PURE HYPERGLYCERIDEMIA: ICD-10-CM

## 2022-11-10 PROCEDURE — 76700 US EXAM ABDOM COMPLETE: CPT

## 2022-11-10 PROCEDURE — 76700 US EXAM ABDOM COMPLETE: CPT | Mod: 26

## 2022-11-23 ENCOUNTER — NON-APPOINTMENT (OUTPATIENT)
Age: 47
End: 2022-11-23

## 2023-01-06 ENCOUNTER — EMERGENCY (EMERGENCY)
Facility: HOSPITAL | Age: 48
LOS: 1 days | Discharge: HOME | End: 2023-01-06
Attending: EMERGENCY MEDICINE
Payer: MEDICAID

## 2023-01-06 VITALS
TEMPERATURE: 98 F | HEART RATE: 83 BPM | WEIGHT: 171.96 LBS | SYSTOLIC BLOOD PRESSURE: 113 MMHG | OXYGEN SATURATION: 98 % | RESPIRATION RATE: 18 BRPM | HEIGHT: 65 IN | DIASTOLIC BLOOD PRESSURE: 74 MMHG

## 2023-01-06 PROCEDURE — 99284 EMERGENCY DEPT VISIT MOD MDM: CPT

## 2023-01-06 PROCEDURE — 99283 EMERGENCY DEPT VISIT LOW MDM: CPT

## 2023-01-06 RX ORDER — FAMOTIDINE 10 MG/ML
20 INJECTION INTRAVENOUS ONCE
Refills: 0 | Status: COMPLETED | OUTPATIENT
Start: 2023-01-06 | End: 2023-01-06

## 2023-01-06 RX ADMIN — FAMOTIDINE 20 MILLIGRAM(S): 10 INJECTION INTRAVENOUS at 16:12

## 2023-01-06 RX ADMIN — Medication 30 MILLILITER(S): at 16:13

## 2023-01-06 NOTE — ED PROVIDER NOTE - OBJECTIVE STATEMENT
47 yr old male with hx of kidney stone s/p surgery presents to ed c/o epigastric pain x 1 day. no fever, no n/v, no constipation, no sob, no alcohol use, no sob, no cough

## 2023-01-06 NOTE — ED PROVIDER NOTE - PATIENT PORTAL LINK FT
You can access the FollowMyHealth Patient Portal offered by [facility] by registering at the following website: [email or web address]. By joining [employer]’s FollowMyHealth portal, you will also be able to view your health information using other applications (apps) compatible with our system.

## 2023-01-06 NOTE — ED PROVIDER NOTE - CLINICAL SUMMARY MEDICAL DECISION MAKING FREE TEXT BOX
47 yr old male with hx of kidney stone s/p surgery presents to ed c/o epigastric pain x 1 day. no fever, no n/v, no constipation, no sob, no alcohol use, no sob, no cough.    Dx gastritis.  pepcid, maalox/mylanta.  avoid spicy, oily, hot foods, NSAIDs, alcohol. see gastroenterologist and pcp.  left ambulatory

## 2023-01-18 ENCOUNTER — EMERGENCY (EMERGENCY)
Facility: HOSPITAL | Age: 48
LOS: 1 days | Discharge: HOME | End: 2023-01-18
Attending: STUDENT IN AN ORGANIZED HEALTH CARE EDUCATION/TRAINING PROGRAM
Payer: MEDICAID

## 2023-01-18 VITALS
WEIGHT: 121.92 LBS | TEMPERATURE: 98 F | OXYGEN SATURATION: 99 % | SYSTOLIC BLOOD PRESSURE: 112 MMHG | DIASTOLIC BLOOD PRESSURE: 73 MMHG | HEIGHT: 65 IN | HEART RATE: 61 BPM | RESPIRATION RATE: 18 BRPM

## 2023-01-18 PROCEDURE — 96372 THER/PROPH/DIAG INJ SC/IM: CPT

## 2023-01-18 PROCEDURE — 99284 EMERGENCY DEPT VISIT MOD MDM: CPT

## 2023-01-18 PROCEDURE — 99283 EMERGENCY DEPT VISIT LOW MDM: CPT | Mod: 25

## 2023-01-18 RX ORDER — KETOROLAC TROMETHAMINE 30 MG/ML
30 SYRINGE (ML) INJECTION ONCE
Refills: 0 | Status: DISCONTINUED | OUTPATIENT
Start: 2023-01-18 | End: 2023-01-18

## 2023-01-18 RX ORDER — IBUPROFEN 200 MG
1 TABLET ORAL
Qty: 20 | Refills: 0
Start: 2023-01-18 | End: 2023-01-22

## 2023-01-18 RX ORDER — LIDOCAINE 4 G/100G
1 CREAM TOPICAL ONCE
Refills: 0 | Status: COMPLETED | OUTPATIENT
Start: 2023-01-18 | End: 2023-01-18

## 2023-01-18 RX ORDER — LIDOCAINE 4 G/100G
1 CREAM TOPICAL
Qty: 7 | Refills: 0
Start: 2023-01-18 | End: 2023-01-24

## 2023-01-18 RX ORDER — METHOCARBAMOL 500 MG/1
2 TABLET, FILM COATED ORAL
Qty: 18 | Refills: 0
Start: 2023-01-18 | End: 2023-01-20

## 2023-01-18 RX ORDER — METHOCARBAMOL 500 MG/1
1500 TABLET, FILM COATED ORAL ONCE
Refills: 0 | Status: COMPLETED | OUTPATIENT
Start: 2023-01-18 | End: 2023-01-18

## 2023-01-18 RX ADMIN — LIDOCAINE 1 PATCH: 4 CREAM TOPICAL at 17:58

## 2023-01-18 RX ADMIN — Medication 30 MILLIGRAM(S): at 18:00

## 2023-01-18 RX ADMIN — Medication 30 MILLIGRAM(S): at 17:58

## 2023-01-18 RX ADMIN — METHOCARBAMOL 1500 MILLIGRAM(S): 500 TABLET, FILM COATED ORAL at 17:58

## 2023-01-18 NOTE — ED PROVIDER NOTE - CHPI ED SYMPTOMS NEG
no weakness, no saddle anesthesia, no fever, no chills/no bladder dysfunction/no bowel dysfunction/no numbness/no tingling

## 2023-01-18 NOTE — ED PROVIDER NOTE - ENMT, MLM
Outpatient follow-up with Geriatrics  Monitor for delirium, sleep hygiene, reorientation Airway patent, Nasal mucosa clear. Mouth with normal mucosa. Throat has no vesicles, no oropharyngeal exudates and uvula is midline.

## 2023-01-18 NOTE — ED PROVIDER NOTE - CLINICAL SUMMARY MEDICAL DECISION MAKING FREE TEXT BOX
48 y/o male w/ left lower back pain, non-radiating to abdomen. No concerning features, neurologically intact. Will give pain meds and reassess.

## 2023-01-18 NOTE — ED PROVIDER NOTE - NS ED ATTENDING STATEMENT MOD
This was a shared visit with the ISMAEL. I reviewed and verified the documentation and independently performed the documented:

## 2023-01-18 NOTE — ED ADULT TRIAGE NOTE - CHIEF COMPLAINT QUOTE
lower left sided back pain x Jan 1st after lifting something heavy, hx of kidney stones but patient states pain from lifting

## 2023-01-18 NOTE — ED PROVIDER NOTE - PATIENT PORTAL LINK FT
You can access the FollowMyHealth Patient Portal offered by [facility] by registering at the following website: [email or web address]. By joining [employer]’s FollowMyHealth portal, you will also be able to view your health information using other applications (apps) compatible with our system.

## 2023-01-18 NOTE — ED PROVIDER NOTE - OBJECTIVE STATEMENT
48 y/o male, w/ history of kidney stones, presents w/ left lower back after lifting something heavy at work around the beginning of January. Says the pain has worsened in the last 2 days and hurts when he puts weight on his left leg. Denies LE numbness, tingling, weakness, saddle anesthesia, fever, chills, IVDU, hx of cancer, bowel or bladder incontinence or any other concerning features. Patient insists that this pain feels differently than the kidney stones he has had previously. No known drug allergies.

## 2023-01-18 NOTE — ED PROVIDER NOTE - PHYSICAL EXAMINATION
Musculoskeletal: Back is symmetrical, scapulae are symmetric. Neck has full range of motion. No spinal tenderness, deformity, or step-offs; + left lower paraspinal tenderness. All limbs equally strong 5+ bilaterally. Strong ankle dorsiflexion and plantar flexion against resistance bilaterally. Strong flexion/extension of big toe bilaterally. Pt is able to walk in straight line with steady gait. No recent weight loss or night sweats. No saddle anesthesia, no bowel/bladder incontinence or retention, no lower extremity weakness.

## 2023-01-18 NOTE — ED PROVIDER NOTE - NSFOLLOWUPINSTRUCTIONS_ED_ALL_ED_FT
Jenny un seguimiento con yeager médico de atención primaria dentro de 1 semana.    Medicamentos enviados a la farmacia para yeager dolor.    Si experimenta síntomas nuevos o que empeoran, o si está preocupado, siempre puede regresar a la emergencia para jose roberto reevaluación.    Follow up with your primary care doctor within 1 week.     Medications sent to the pharmacy for your pain.     If you experience any new or worsening symptoms or if you are concerned you can always come back to the emergency for a re-evaluation.     Dolor de espalda    El dolor de espalda puede mary miedo. Parul incluso cuando el dolor es intenso, por lo general desaparece por sí solo en unas pocas semanas. Los casos que requieren atención urgente o cirugía son raros. No asumas lo peor. Nehal todo el maria d tiene dolor de espalda en algún momento.    Consulte a yeager médico o enfermera si tiene dolor de espalda y usted:    -Recientemente tuvo jose roberto caída o jose roberto lesión en la espalda    -Tiene entumecimiento o debilidad en las piernas    -Tiene problemas con el control de la vejiga o el intestino    -Tiene pérdida de peso inexplicable    -Tener fiebre o sentirse enfermo de otras maneras    -John medicamentos esteroides, rachael prednisona, de forma regular.    -Tiene diabetes o un problema médico que debilita yeager sistema inmunológico    -Tener antecedentes de cáncer u osteoporosis.    También debe consultar a un médico si:    -Yeager dolor de espalda es tan severo que no puede realizar tareas simples    -Yeager dolor de espalda no comienza a mejorar en 4 semanas    Muchas cosas diferentes pueden causar dolor lumbar. La mayoría de las veces, los médicos no conocen la causa exacta.    El dolor de espalda puede ocurrir si se esfuerza un músculo. [place] es a menudo lo que gauthier sucedido cuando jose roberto persona "se caleb" la espalda. [place] se refiere al dolor que comienza repentinamente después de la actividad física, rachael levantar algo pesado o doblar la espalda.    El dolor de espalda también puede ocurrir si tiene:    -Discos dañados, abultados o rotos    -Artritis que afecta las articulaciones de la columna vertebral    - Crecimientos óseos en las vértebras que aprietan los nervios cercanos    -Jose Roberto vértebra fuera de lugar    -Estrechamiento en el canal bhatia    -Un tumor o infección (parul esto es muy raro)    La mayoría de las personas con un episodio de dolor lumbar no tienen un problema médico grave y pueden probar tratamientos simples rachael:    -Mantenerse activo: lo mejor que puede hacer es mantenerse lo más activo posible. Las personas con dolor lumbar se recuperan más rápido si se mantienen activas. Si yeager dolor es intenso, es posible que deba descansar flaco chapo o dos días. Parul es importante volver a caminar y moverse lo antes posible. Si mark debe evitar el levantamiento de objetos pesados ??y los deportes mientras le duele la espalda, intente continuar con brunilda actividades diarias normales.    -Calor: a algunas personas les resulta útil usar jose roberto almohadilla térmica o jose roberto envoltura térmica. Tenga cuidado de evitar los ajustes de calor alto para evitar quemaduras en la piel.    -Medicamentos: vanessa, puede probar analgésicos que puede obtener sin receta médica. En muchos casos, los médicos sugieren probar vanessa un medicamento antiinflamatorio no esteroideo o "LUIS F". Los LUIS F incluyen ibuprofeno (marcas de muestra: Advil, Motrin) y naproxeno (marcas de muestra: Aleve). Estos podrían funcionar mejor que el paracetamol (Tylenol) para el dolor de espalda.    -Tratamientos para ayudar con los síntomas: algunos tratamientos pueden ayudarlo a sentirse mejor por un tiempo. Incluyen:    -Manipulación de la columna: esto es cuando un quiropráctico, fisioterapeuta u otro profesional mueve o "ajusta" las articulaciones de la espalda. Si quiere probar esto, hable vanessa con yeager médico o enfermera.    -Acupuntura: esto es cuando alguien que conoce la medicina tradicional china inserta pequeñas agujas en yeager cuerpo para bloquear las señales de dolor.    -Masaje    Si mark el dolor de espalda generalmente desaparece en unas pocas semanas, algunas personas continúan teniendo dolor por más tiempo. En beto damien, los tratamientos adicionales pueden incluir:    -Cuidado propio: esto implica ser consciente de yeager dolor. Si mark debe descansar cuando lo necesite, es importante mantenerse activo tanto rachael pueda. Cosas rachael aplicar calor y hacer estiramientos suaves también pueden ayudarlo a sentirse mejor.    -Fisioterapia: un fisioterapeuta es un experto en ejercicios que puede enseñarle estiramientos y movimientos para ayudarlo a fortalecer brunilda músculos. El objetivo es aliviar el dolor parul también ayudarlo a volver a brunilda actividades normales. Los ejercicios que puede probar incluyen caminar, nadar o usar jose roberto bicicleta estática. Algunas personas también encuentran que el Javier Chi o el yoga pueden ayudar con el dolor de espalda. Encontrar actividades que disfrute puede ayudarlo a mantenerse activo.    -Reducir el estrés: a algunas personas les resulta útil probar algo llamado "reducción del estrés basada en la atención plena". [place] implica asistir a un programa grupal para practicar la relajación y la meditación. Si yeager dolor de espalda lo hace sentir ansioso o deprimido, hable con yeager médico o enfermera. Existen otros tratamientos que pueden ayudar con estos problemas.

## 2023-01-29 ENCOUNTER — EMERGENCY (EMERGENCY)
Facility: HOSPITAL | Age: 48
LOS: 1 days | Discharge: HOME | End: 2023-01-29
Attending: EMERGENCY MEDICINE
Payer: SELF-PAY

## 2023-01-29 VITALS
TEMPERATURE: 98 F | HEIGHT: 65 IN | HEART RATE: 70 BPM | DIASTOLIC BLOOD PRESSURE: 81 MMHG | WEIGHT: 167.99 LBS | SYSTOLIC BLOOD PRESSURE: 122 MMHG | RESPIRATION RATE: 18 BRPM | OXYGEN SATURATION: 100 %

## 2023-01-29 LAB
ALBUMIN SERPL ELPH-MCNC: 4 G/DL — SIGNIFICANT CHANGE UP (ref 3.5–5)
ALP SERPL-CCNC: 99 U/L — SIGNIFICANT CHANGE UP (ref 40–120)
ALT FLD-CCNC: 46 U/L DA — SIGNIFICANT CHANGE UP (ref 10–60)
ANION GAP SERPL CALC-SCNC: 3 MMOL/L — LOW (ref 5–17)
APPEARANCE UR: CLEAR — SIGNIFICANT CHANGE UP
AST SERPL-CCNC: 28 U/L — SIGNIFICANT CHANGE UP (ref 10–40)
BILIRUB SERPL-MCNC: 0.6 MG/DL — SIGNIFICANT CHANGE UP (ref 0.2–1.2)
BILIRUB UR-MCNC: NEGATIVE — SIGNIFICANT CHANGE UP
BUN SERPL-MCNC: 22 MG/DL — HIGH (ref 7–18)
CALCIUM SERPL-MCNC: 9.1 MG/DL — SIGNIFICANT CHANGE UP (ref 8.4–10.5)
CHLORIDE SERPL-SCNC: 108 MMOL/L — SIGNIFICANT CHANGE UP (ref 96–108)
CO2 SERPL-SCNC: 27 MMOL/L — SIGNIFICANT CHANGE UP (ref 22–31)
COLOR SPEC: YELLOW — SIGNIFICANT CHANGE UP
CREAT SERPL-MCNC: 0.74 MG/DL — SIGNIFICANT CHANGE UP (ref 0.5–1.3)
DIFF PNL FLD: NEGATIVE — SIGNIFICANT CHANGE UP
EGFR: 111 ML/MIN/1.73M2 — SIGNIFICANT CHANGE UP
GLUCOSE SERPL-MCNC: 97 MG/DL — SIGNIFICANT CHANGE UP (ref 70–99)
GLUCOSE UR QL: NEGATIVE — SIGNIFICANT CHANGE UP
HCT VFR BLD CALC: 42.9 % — SIGNIFICANT CHANGE UP (ref 39–50)
HGB BLD-MCNC: 14.2 G/DL — SIGNIFICANT CHANGE UP (ref 13–17)
KETONES UR-MCNC: NEGATIVE — SIGNIFICANT CHANGE UP
LEUKOCYTE ESTERASE UR-ACNC: NEGATIVE — SIGNIFICANT CHANGE UP
MCHC RBC-ENTMCNC: 29.2 PG — SIGNIFICANT CHANGE UP (ref 27–34)
MCHC RBC-ENTMCNC: 33.1 GM/DL — SIGNIFICANT CHANGE UP (ref 32–36)
MCV RBC AUTO: 88.1 FL — SIGNIFICANT CHANGE UP (ref 80–100)
NITRITE UR-MCNC: NEGATIVE — SIGNIFICANT CHANGE UP
NRBC # BLD: 0 /100 WBCS — SIGNIFICANT CHANGE UP (ref 0–0)
PH UR: 7 — SIGNIFICANT CHANGE UP (ref 5–8)
PLATELET # BLD AUTO: 260 K/UL — SIGNIFICANT CHANGE UP (ref 150–400)
POTASSIUM SERPL-MCNC: 4.1 MMOL/L — SIGNIFICANT CHANGE UP (ref 3.5–5.3)
POTASSIUM SERPL-SCNC: 4.1 MMOL/L — SIGNIFICANT CHANGE UP (ref 3.5–5.3)
PROT SERPL-MCNC: 7.9 G/DL — SIGNIFICANT CHANGE UP (ref 6–8.3)
PROT UR-MCNC: NEGATIVE — SIGNIFICANT CHANGE UP
RBC # BLD: 4.87 M/UL — SIGNIFICANT CHANGE UP (ref 4.2–5.8)
RBC # FLD: 12.9 % — SIGNIFICANT CHANGE UP (ref 10.3–14.5)
RBC CASTS # UR COMP ASSIST: NEGATIVE /HPF — SIGNIFICANT CHANGE UP (ref 0–2)
SODIUM SERPL-SCNC: 138 MMOL/L — SIGNIFICANT CHANGE UP (ref 135–145)
SP GR SPEC: 1 — LOW (ref 1.01–1.02)
UROBILINOGEN FLD QL: NEGATIVE — SIGNIFICANT CHANGE UP
WBC # BLD: 7.23 K/UL — SIGNIFICANT CHANGE UP (ref 3.8–10.5)
WBC # FLD AUTO: 7.23 K/UL — SIGNIFICANT CHANGE UP (ref 3.8–10.5)
WBC UR QL: SIGNIFICANT CHANGE UP /HPF (ref 0–5)

## 2023-01-29 PROCEDURE — 96361 HYDRATE IV INFUSION ADD-ON: CPT

## 2023-01-29 PROCEDURE — 99284 EMERGENCY DEPT VISIT MOD MDM: CPT

## 2023-01-29 PROCEDURE — 99284 EMERGENCY DEPT VISIT MOD MDM: CPT | Mod: 25

## 2023-01-29 PROCEDURE — 80053 COMPREHEN METABOLIC PANEL: CPT

## 2023-01-29 PROCEDURE — 36415 COLL VENOUS BLD VENIPUNCTURE: CPT

## 2023-01-29 PROCEDURE — 85027 COMPLETE CBC AUTOMATED: CPT

## 2023-01-29 PROCEDURE — 81001 URINALYSIS AUTO W/SCOPE: CPT

## 2023-01-29 PROCEDURE — 96374 THER/PROPH/DIAG INJ IV PUSH: CPT

## 2023-01-29 PROCEDURE — 87086 URINE CULTURE/COLONY COUNT: CPT

## 2023-01-29 RX ORDER — KETOROLAC TROMETHAMINE 30 MG/ML
15 SYRINGE (ML) INJECTION ONCE
Refills: 0 | Status: DISCONTINUED | OUTPATIENT
Start: 2023-01-29 | End: 2023-01-29

## 2023-01-29 RX ORDER — SODIUM CHLORIDE 9 MG/ML
1000 INJECTION INTRAMUSCULAR; INTRAVENOUS; SUBCUTANEOUS ONCE
Refills: 0 | Status: COMPLETED | OUTPATIENT
Start: 2023-01-29 | End: 2023-01-29

## 2023-01-29 RX ADMIN — Medication 15 MILLIGRAM(S): at 17:37

## 2023-01-29 RX ADMIN — Medication 15 MILLIGRAM(S): at 18:07

## 2023-01-29 RX ADMIN — SODIUM CHLORIDE 1000 MILLILITER(S): 9 INJECTION INTRAMUSCULAR; INTRAVENOUS; SUBCUTANEOUS at 17:37

## 2023-01-29 RX ADMIN — SODIUM CHLORIDE 1000 MILLILITER(S): 9 INJECTION INTRAMUSCULAR; INTRAVENOUS; SUBCUTANEOUS at 18:37

## 2023-01-29 NOTE — ED PROVIDER NOTE - NSFOLLOWUPCLINICS_GEN_ALL_ED_FT
[facility]  Neurology  [address] [place] [address]  Phone: [phone]  Fax: [phone]

## 2023-01-29 NOTE — ED PROVIDER NOTE - CLINICAL SUMMARY MEDICAL DECISION MAKING FREE TEXT BOX
47-year-old male with chronic back pain.  No concerning features or red flags.  Normal neurologic exam.  Patient states his pain is different than renal colic which is less likely than musculoskeletal etiology.  Given patient's age, lack of trauma, lack of midline tenderness, x-rays are not indicated.  I explained that CT of the lumbar spine would not be helpful in this setting.  I explained that MRI for this type of problem is not possible in the ER as pt has no red flags/concerning features of neurosurgical emergency, but I offered to have follow-up coordinator Lenora duran and arrange for outpatient MRI. Patient and his wife are extremely frustrated by these options and continue to perseverate on getting morphine and an MRI.

## 2023-01-29 NOTE — ED PROVIDER NOTE - PROGRESS NOTE DETAILS
labs and UA wnl  Pain controlled  Dc w/ out pt fu  Discussed indications for patient return to ED. Patient understood.

## 2023-01-29 NOTE — ED PROVIDER NOTE - PHYSICAL EXAMINATION
GENERAL: well appearing, no acute distress   HEAD: atraumatic   EYES: EOMI   ENT: moist oral mucosa   CARDIAC: regular rate  RESPIRATORY: no increased work of breathing   ABDO: soft NT  MUSCULOSKELETAL: no deformity, mild lumbar paraspinal ttp w/o midline tenderness or step offs   NEUROLOGICAL: alert, spontaneous movement of extremities   SKIN: no visible rash  PSYCHIATRIC: cooperative

## 2023-01-29 NOTE — ED ADULT NURSE NOTE - CAS ELECT INFOMATION PROVIDED
Follow-up with Neurologist and Primary Care Provider. Return to the ED for new or worsening symptoms./DC instructions

## 2023-01-29 NOTE — ED ADULT TRIAGE NOTE - CHIEF COMPLAINT QUOTE
Returns with C/o worsening left lower back pain radiating into left groin and left testicle x 1 month. Hx kidney stone

## 2023-01-29 NOTE — ED PROVIDER NOTE - PATIENT PORTAL LINK FT
You can access the FollowMyHealth Patient Portal offered by [facility] by registering at the following website: [email or web address]. By joining [employer]’s FollowMyHealth portal, you will also be able to view your health information using other applications (apps) compatible with our system.

## 2023-01-30 LAB
CULTURE RESULTS: SIGNIFICANT CHANGE UP
SPECIMEN SOURCE: SIGNIFICANT CHANGE UP

## 2023-01-31 ENCOUNTER — OUTPATIENT (OUTPATIENT)
Dept: OUTPATIENT SERVICES | Facility: HOSPITAL | Age: 48
LOS: 1 days | End: 2023-01-31
Payer: SELF-PAY

## 2023-01-31 ENCOUNTER — APPOINTMENT (OUTPATIENT)
Dept: INTERNAL MEDICINE | Facility: CLINIC | Age: 48
End: 2023-01-31
Payer: COMMERCIAL

## 2023-01-31 VITALS
BODY MASS INDEX: 27.66 KG/M2 | TEMPERATURE: 98.6 F | WEIGHT: 166 LBS | HEIGHT: 65 IN | HEART RATE: 64 BPM | OXYGEN SATURATION: 99 % | SYSTOLIC BLOOD PRESSURE: 106 MMHG | RESPIRATION RATE: 16 BRPM | DIASTOLIC BLOOD PRESSURE: 73 MMHG

## 2023-01-31 DIAGNOSIS — M25.559 PAIN IN UNSPECIFIED HIP: ICD-10-CM

## 2023-01-31 DIAGNOSIS — Z00.00 ENCOUNTER FOR GENERAL ADULT MEDICAL EXAMINATION WITHOUT ABNORMAL FINDINGS: ICD-10-CM

## 2023-01-31 PROCEDURE — 99214 OFFICE O/P EST MOD 30 MIN: CPT

## 2023-01-31 PROCEDURE — ZZZZZ: CPT

## 2023-01-31 PROCEDURE — 80061 LIPID PANEL: CPT

## 2023-01-31 PROCEDURE — G0463: CPT

## 2023-01-31 PROCEDURE — 36415 COLL VENOUS BLD VENIPUNCTURE: CPT

## 2023-01-31 NOTE — ASSESSMENT
[FreeTextEntry1] : 48 yo referred to ortho for eval.of low back pain and L.hip pain.\par PMH high TG

## 2023-01-31 NOTE — HEALTH RISK ASSESSMENT
[Former] : Former [< 15 Years] : < 15 Years [No falls in past year] : Patient reported no falls in the past year [de-identified] : 1

## 2023-01-31 NOTE — HISTORY OF PRESENT ILLNESS
[de-identified] : 48 yo c/o intense pain,L.hip.the pain is 10(0-10),occ.,exacerbated by walking.\par

## 2023-02-01 NOTE — BRIEF OPERATIVE NOTE - ASSISTANT(S)
Amber Hayes PGY2 Sarecycline Counseling: Patient advised regarding possible photosensitivity and discoloration of the teeth, skin, lips, tongue and gums.  Patient instructed to avoid sunlight, if possible.  When exposed to sunlight, patients should wear protective clothing, sunglasses, and sunscreen.  The patient was instructed to call the office immediately if the following severe adverse effects occur:  hearing changes, easy bruising/bleeding, severe headache, or vision changes.  The patient verbalized understanding of the proper use and possible adverse effects of sarecycline.  All of the patient's questions and concerns were addressed.

## 2023-02-02 DIAGNOSIS — M54.50 LOW BACK PAIN, UNSPECIFIED: ICD-10-CM

## 2023-02-02 DIAGNOSIS — M25.559 PAIN IN UNSPECIFIED HIP: ICD-10-CM

## 2023-02-04 LAB
CHOLEST SERPL-MCNC: 183 MG/DL
HDLC SERPL-MCNC: 54 MG/DL
LDLC SERPL CALC-MCNC: 100 MG/DL
NONHDLC SERPL-MCNC: 129 MG/DL
TRIGL SERPL-MCNC: 144 MG/DL

## 2023-02-10 ENCOUNTER — NON-APPOINTMENT (OUTPATIENT)
Age: 48
End: 2023-02-10

## 2023-02-15 ENCOUNTER — APPOINTMENT (OUTPATIENT)
Dept: ORTHOPEDIC SURGERY | Facility: HOSPITAL | Age: 48
End: 2023-02-15

## 2023-02-15 ENCOUNTER — OUTPATIENT (OUTPATIENT)
Dept: OUTPATIENT SERVICES | Facility: HOSPITAL | Age: 48
LOS: 1 days | End: 2023-02-15
Payer: SELF-PAY

## 2023-02-15 VITALS
DIASTOLIC BLOOD PRESSURE: 76 MMHG | BODY MASS INDEX: 27.66 KG/M2 | HEIGHT: 65 IN | WEIGHT: 166 LBS | RESPIRATION RATE: 14 BRPM | SYSTOLIC BLOOD PRESSURE: 119 MMHG | HEART RATE: 71 BPM | TEMPERATURE: 97.5 F

## 2023-02-15 DIAGNOSIS — M54.50 LOW BACK PAIN, UNSPECIFIED: ICD-10-CM

## 2023-02-15 DIAGNOSIS — M79.609 PAIN IN UNSPECIFIED LIMB: ICD-10-CM

## 2023-02-15 PROCEDURE — G0463: CPT

## 2023-02-15 RX ORDER — BISMUTH SUBSALICYLATE 262 MG/1
262 TABLET, CHEWABLE ORAL 4 TIMES DAILY
Qty: 56 | Refills: 0 | Status: DISCONTINUED | COMMUNITY
Start: 2022-04-26 | End: 2023-02-15

## 2023-02-15 RX ORDER — POLYETHYLENE GLYCOL 3350 17 G/17G
17 POWDER, FOR SOLUTION ORAL
Qty: 1 | Refills: 0 | Status: DISCONTINUED | COMMUNITY
Start: 2022-03-15 | End: 2023-02-15

## 2023-02-15 RX ORDER — DOXYCYCLINE 100 MG/1
100 CAPSULE ORAL TWICE DAILY
Qty: 20 | Refills: 0 | Status: DISCONTINUED | COMMUNITY
Start: 2021-11-05 | End: 2023-02-15

## 2023-02-15 RX ORDER — METRONIDAZOLE 250 MG/1
250 TABLET ORAL EVERY 6 HOURS
Qty: 56 | Refills: 0 | Status: DISCONTINUED | COMMUNITY
Start: 2022-04-26 | End: 2023-02-15

## 2023-02-15 RX ORDER — OMEPRAZOLE 20 MG/1
20 CAPSULE, DELAYED RELEASE ORAL TWICE DAILY
Qty: 28 | Refills: 0 | Status: DISCONTINUED | COMMUNITY
Start: 2022-04-26 | End: 2023-02-15

## 2023-02-15 RX ORDER — DICLOFENAC SODIUM 50 MG/1
50 TABLET, DELAYED RELEASE ORAL
Qty: 60 | Refills: 5 | Status: DISCONTINUED | COMMUNITY
Start: 2023-01-31 | End: 2023-02-15

## 2023-02-15 RX ORDER — FENOFIBRATE 48 MG/1
48 TABLET ORAL DAILY
Qty: 90 | Refills: 3 | Status: DISCONTINUED | COMMUNITY
Start: 2022-06-30 | End: 2023-02-15

## 2023-02-15 RX ORDER — TETRACYCLINE HYDROCHLORIDE 500 MG/1
500 CAPSULE ORAL EVERY 6 HOURS
Qty: 56 | Refills: 0 | Status: DISCONTINUED | COMMUNITY
Start: 2022-04-26 | End: 2023-02-15

## 2023-02-28 ENCOUNTER — APPOINTMENT (OUTPATIENT)
Dept: INTERNAL MEDICINE | Facility: CLINIC | Age: 48
End: 2023-02-28

## 2023-04-12 NOTE — ED PROVIDER NOTE - NSFOLLOWUPINSTRUCTIONS_ED_ALL_ED_FT
Follow up with your PCP in 24-48 hours.   Follow up with a urologist within 1 week (our clinical care coordinator will call you to help you make an appointment).   May take Tylenol and Motrin as directed on the bottle for pain control.   Return to the ER if you develop any new or worsening symptoms such as fever, difficulty urinating, chest pain, shortness of breath, numbness, weakness, abdominal pain, nausea, vomiting, or visual changes.     Jenny un seguimiento con yeager PCP en 24 a 48 horas.  Jenny un seguimiento con un urólogo dentro de 1 semana (nuestro coordinador de atención clínica lo llamará para ayudarlo a programar jose roberto jade).  Puede andrea Tylenol y Motrin rachael se indica en el frasco para controlar el dolor.  Regrese a la cally de emergencias si presenta síntomas nuevos o que empeoran, rachael fiebre, dificultad para orinar, dolor en el pecho, dificultad para respirar, entumecimiento, debilidad, dolor abdominal, náuseas, vómitos o cambios visuales.
bilateral TM's clear

## 2023-04-26 ENCOUNTER — RESULT REVIEW (OUTPATIENT)
Age: 48
End: 2023-04-26

## 2023-04-26 ENCOUNTER — APPOINTMENT (OUTPATIENT)
Dept: ORTHOPEDIC SURGERY | Facility: HOSPITAL | Age: 48
End: 2023-04-26

## 2023-04-26 ENCOUNTER — OUTPATIENT (OUTPATIENT)
Dept: OUTPATIENT SERVICES | Facility: HOSPITAL | Age: 48
LOS: 1 days | End: 2023-04-26
Payer: SELF-PAY

## 2023-04-26 VITALS
WEIGHT: 172 LBS | HEART RATE: 74 BPM | RESPIRATION RATE: 14 BRPM | SYSTOLIC BLOOD PRESSURE: 129 MMHG | TEMPERATURE: 98 F | BODY MASS INDEX: 28.66 KG/M2 | DIASTOLIC BLOOD PRESSURE: 81 MMHG | HEIGHT: 65 IN

## 2023-04-26 DIAGNOSIS — M25.50 PAIN IN UNSPECIFIED JOINT: ICD-10-CM

## 2023-04-26 DIAGNOSIS — M54.50 LOW BACK PAIN, UNSPECIFIED: ICD-10-CM

## 2023-04-26 PROCEDURE — G0463: CPT

## 2023-04-26 PROCEDURE — 73522 X-RAY EXAM HIPS BI 3-4 VIEWS: CPT

## 2023-04-26 PROCEDURE — 73522 X-RAY EXAM HIPS BI 3-4 VIEWS: CPT | Mod: 26

## 2023-04-26 PROCEDURE — 72100 X-RAY EXAM L-S SPINE 2/3 VWS: CPT

## 2023-04-26 PROCEDURE — 72100 X-RAY EXAM L-S SPINE 2/3 VWS: CPT | Mod: 26

## 2023-05-16 NOTE — ED PROVIDER NOTE - PATIENT PORTAL LINK FT
Spoke with mom, made her aware , referral placed for dermatology and number provided to schedule    You can access the FollowMyHealth Patient Portal offered by [facility] by registering at the following website: [email or web address]. By joining [employer]’s FollowMyHealth portal, you will also be able to view your health information using other applications (apps) compatible with our system.

## 2023-06-30 NOTE — ED ADULT NURSE NOTE - OBJECTIVE STATEMENT
[facility] 75  coding opportunities       Chart reviewed, no opportunity found: CHART REVIEWED, NO OPPORTUNITY FOUND        Patients Insurance        Commercial Insurance: [employer]
c/o abdominal pain x 2 days. No nausea/vomiting/diarrhea/fever

## 2023-10-03 NOTE — ED PROVIDER NOTE - CHILD ABUSE FACILITY
Subjective:       Patient ID: Katina Singh is a 82 y.o. female.    Chief Complaint: Wrist Pain    Wrist Pain   The pain is present in the right wrist. This is a chronic problem. The current episode started more than 1 month ago. The problem occurs daily. The problem has been unchanged. The quality of the pain is described as aching. The pain is moderate. Associated symptoms include a limited range of motion and stiffness. Pertinent negatives include no fever. Exacerbated by: movement. She has tried acetaminophen for the symptoms. The treatment provided mild relief.       Review of Systems   Constitutional:  Positive for activity change. Negative for fatigue, fever and unexpected weight change.   HENT:  Negative for ear pain, hearing loss, rhinorrhea, sore throat and trouble swallowing.    Eyes:  Negative for pain, discharge and visual disturbance.   Respiratory:  Negative for cough, chest tightness, shortness of breath and wheezing.    Cardiovascular:  Negative for chest pain and palpitations.   Gastrointestinal:  Negative for abdominal pain, blood in stool, constipation, diarrhea and vomiting.   Endocrine: Negative for polydipsia and polyuria.   Genitourinary:  Negative for difficulty urinating, dysuria, hematuria and menstrual problem.   Musculoskeletal:  Positive for arthralgias, joint swelling and stiffness. Negative for myalgias and neck pain.   Skin:  Negative for color change and rash.   Neurological:  Negative for dizziness, weakness and headaches.   Psychiatric/Behavioral:  Negative for confusion, dysphoric mood and sleep disturbance. The patient is not nervous/anxious.        Vitals:    10/03/23 0748   BP: (!) 118/55   Pulse: 83       Objective:     Current Outpatient Medications   Medication Sig Dispense Refill    acetaminophen (TYLENOL) 325 MG tablet Take 650 mg by mouth every 6 (six) hours as needed for Pain.      acetaminophen (TYLENOL) 650 MG TbSR 1 tablet as needed      aspirin (ECOTRIN) 81 MG EC  tablet Take 325 mg by mouth once daily.       calcium citrate-vitamin D3 315-200 mg (CITRACAL+D) 315-200 mg-unit per tablet Take 1 tablet by mouth 2 (two) times daily.      carboxymethylcellulose (REFRESH PLUS) 0.5 % Dpet Place 1 drop into both eyes 3 (three) times daily as needed.       fexofenadine (ALLEGRA) 180 MG tablet Take 180 mg by mouth once daily.      fluocinonide (LIDEX) 0.05 % external solution Apply topically.      furosemide (LASIX) 20 MG tablet Take 1 tablet (20 mg total) by mouth daily as needed (swelling). 90 tablet 11    latanoprost 0.005 % ophthalmic solution Place 1 drop into both eyes every evening.      loteprednol (LOTEMAX) 0.5 % ophthalmic suspension Place into both eyes.      metoprolol succinate (TOPROL-XL) 50 MG 24 hr tablet Take 1 tablet (50 mg total) by mouth once daily. 90 tablet 11    MULTIVITAMIN W-MINERALS/LUTEIN (CENTRUM SILVER ORAL) Take by mouth once daily.       polycarbophil Gel Place vaginally twice a week.       pravastatin (PRAVACHOL) 40 MG tablet Take 1 tablet (40 mg total) by mouth once daily. 90 tablet 11    prednisoLONE acetate (PRED FORTE) 1 % DrpS       timolol maleate 0.5% (TIMOPTIC) 0.5 % Drop Place 1 drop into both eyes once daily.       timolol maleate 0.5% (TIMOPTIC) 0.5 % Drop 1 drop.      vitamin D (VITAMIN D3) 1000 units Tab Take 1,000 Units by mouth once daily.      meloxicam (MOBIC) 7.5 MG tablet Take 1 tablet (7.5 mg total) by mouth once daily. 30 tablet 2     No current facility-administered medications for this visit.       Physical Exam  Constitutional:       Appearance: She is well-developed.   HENT:      Head: Normocephalic.   Pulmonary:      Effort: Pulmonary effort is normal. No respiratory distress.   Musculoskeletal:      Left wrist: Swelling and tenderness present. Decreased range of motion.      Cervical back: Normal range of motion.   Neurological:      Mental Status: She is alert and oriented to person, place, and time.   Psychiatric:          Thought Content: Thought content normal.         Judgment: Judgment normal.         Assessment:       1. Right wrist pain        Plan:   Right wrist pain  -     X-Ray Wrist Complete 3 views Right; Future; Expected date: 10/03/2023    Other orders  -     meloxicam (MOBIC) 7.5 MG tablet; Take 1 tablet (7.5 mg total) by mouth once daily.  Dispense: 30 tablet; Refill: 2        No follow-ups on file.    There are no Patient Instructions on file for this visit.     H

## 2023-10-16 NOTE — ED PROVIDER NOTE - DISCHARGE DATE
Unable to reach or LVM for patient regarding a sooner appt from the wait-list due to number rings and then goes busy.    18-Jan-2023

## 2023-12-08 ENCOUNTER — APPOINTMENT (OUTPATIENT)
Dept: FAMILY MEDICINE | Facility: CLINIC | Age: 48
End: 2023-12-08
Payer: COMMERCIAL

## 2023-12-08 ENCOUNTER — APPOINTMENT (OUTPATIENT)
Dept: INTERNAL MEDICINE | Facility: CLINIC | Age: 48
End: 2023-12-08

## 2023-12-08 ENCOUNTER — OUTPATIENT (OUTPATIENT)
Dept: OUTPATIENT SERVICES | Facility: HOSPITAL | Age: 48
LOS: 1 days | End: 2023-12-08
Payer: SELF-PAY

## 2023-12-08 VITALS
HEIGHT: 65 IN | RESPIRATION RATE: 18 BRPM | HEART RATE: 66 BPM | BODY MASS INDEX: 28.16 KG/M2 | SYSTOLIC BLOOD PRESSURE: 101 MMHG | OXYGEN SATURATION: 98 % | DIASTOLIC BLOOD PRESSURE: 64 MMHG | TEMPERATURE: 97 F | WEIGHT: 169 LBS

## 2023-12-08 DIAGNOSIS — Z23 ENCOUNTER FOR IMMUNIZATION: ICD-10-CM

## 2023-12-08 DIAGNOSIS — Z00.00 ENCOUNTER FOR GENERAL ADULT MEDICAL EXAMINATION WITHOUT ABNORMAL FINDINGS: ICD-10-CM

## 2023-12-08 DIAGNOSIS — Z87.442 PERSONAL HISTORY OF URINARY CALCULI: ICD-10-CM

## 2023-12-08 DIAGNOSIS — Z13.29 ENCOUNTER FOR SCREENING FOR OTHER SUSPECTED ENDOCRINE DISORDER: ICD-10-CM

## 2023-12-08 PROCEDURE — 84443 ASSAY THYROID STIM HORMONE: CPT

## 2023-12-08 PROCEDURE — 36415 COLL VENOUS BLD VENIPUNCTURE: CPT

## 2023-12-08 PROCEDURE — 80061 LIPID PANEL: CPT

## 2023-12-08 PROCEDURE — 90686 IIV4 VACC NO PRSV 0.5 ML IM: CPT

## 2023-12-08 PROCEDURE — ZZZZZ: CPT

## 2023-12-08 PROCEDURE — G0008: CPT

## 2023-12-08 PROCEDURE — 80053 COMPREHEN METABOLIC PANEL: CPT

## 2023-12-08 PROCEDURE — G0463: CPT

## 2023-12-08 PROCEDURE — 82306 VITAMIN D 25 HYDROXY: CPT

## 2023-12-08 RX ORDER — ERGOCALCIFEROL 1.25 MG/1
1.25 MG CAPSULE, LIQUID FILLED ORAL
Qty: 12 | Refills: 2 | Status: DISCONTINUED | COMMUNITY
Start: 2021-12-23 | End: 2023-12-08

## 2023-12-08 RX ORDER — DICLOFENAC SODIUM 1% 10 MG/G
1 GEL TOPICAL
Qty: 200 | Refills: 2 | Status: DISCONTINUED | COMMUNITY
Start: 2022-06-23 | End: 2023-12-08

## 2023-12-08 RX ORDER — ACETAMINOPHEN 325 MG/1
325 TABLET, FILM COATED ORAL
Refills: 0 | Status: DISCONTINUED | COMMUNITY
End: 2023-12-08

## 2023-12-08 RX ORDER — IBUPROFEN 800 MG
800 TABLET ORAL
Refills: 0 | Status: DISCONTINUED | COMMUNITY
End: 2023-12-08

## 2023-12-11 DIAGNOSIS — A04.8 OTHER SPECIFIED BACTERIAL INTESTINAL INFECTIONS: ICD-10-CM

## 2023-12-11 DIAGNOSIS — R10.13 EPIGASTRIC PAIN: ICD-10-CM

## 2023-12-11 DIAGNOSIS — E78.5 HYPERLIPIDEMIA, UNSPECIFIED: ICD-10-CM

## 2023-12-11 DIAGNOSIS — Z13.29 ENCOUNTER FOR SCREENING FOR OTHER SUSPECTED ENDOCRINE DISORDER: ICD-10-CM

## 2023-12-11 DIAGNOSIS — E55.9 VITAMIN D DEFICIENCY, UNSPECIFIED: ICD-10-CM

## 2023-12-11 DIAGNOSIS — Z23 ENCOUNTER FOR IMMUNIZATION: ICD-10-CM

## 2023-12-11 DIAGNOSIS — Z87.442 PERSONAL HISTORY OF URINARY CALCULI: ICD-10-CM

## 2023-12-12 ENCOUNTER — NON-APPOINTMENT (OUTPATIENT)
Age: 48
End: 2023-12-12

## 2023-12-12 LAB
25(OH)D3 SERPL-MCNC: 20.1 NG/ML
ALBUMIN SERPL ELPH-MCNC: 4.3 G/DL
ALP BLD-CCNC: 140 U/L
ALT SERPL-CCNC: 52 U/L
ANION GAP SERPL CALC-SCNC: 11 MMOL/L
AST SERPL-CCNC: 34 U/L
BILIRUB SERPL-MCNC: 0.2 MG/DL
BUN SERPL-MCNC: 22 MG/DL
CALCIUM SERPL-MCNC: 8.7 MG/DL
CHLORIDE SERPL-SCNC: 109 MMOL/L
CHOLEST SERPL-MCNC: 135 MG/DL
CO2 SERPL-SCNC: 22 MMOL/L
CREAT SERPL-MCNC: 0.62 MG/DL
EGFR: 117 ML/MIN/1.73M2
GLUCOSE SERPL-MCNC: 96 MG/DL
HDLC SERPL-MCNC: 39 MG/DL
LDLC SERPL CALC-MCNC: 77 MG/DL
NONHDLC SERPL-MCNC: 95 MG/DL
POTASSIUM SERPL-SCNC: 4.2 MMOL/L
PROT SERPL-MCNC: 7 G/DL
SODIUM SERPL-SCNC: 142 MMOL/L
TRIGL SERPL-MCNC: 95 MG/DL
TSH SERPL-ACNC: 1.58 UIU/ML

## 2024-01-02 ENCOUNTER — EMERGENCY (EMERGENCY)
Facility: HOSPITAL | Age: 49
LOS: 1 days | Discharge: HOME | End: 2024-01-02
Attending: EMERGENCY MEDICINE
Payer: MEDICAID

## 2024-01-02 VITALS
OXYGEN SATURATION: 99 % | DIASTOLIC BLOOD PRESSURE: 88 MMHG | RESPIRATION RATE: 16 BRPM | TEMPERATURE: 98 F | SYSTOLIC BLOOD PRESSURE: 127 MMHG | WEIGHT: 175.93 LBS | HEART RATE: 77 BPM

## 2024-01-02 VITALS
OXYGEN SATURATION: 97 % | HEART RATE: 70 BPM | TEMPERATURE: 98 F | SYSTOLIC BLOOD PRESSURE: 137 MMHG | DIASTOLIC BLOOD PRESSURE: 91 MMHG | RESPIRATION RATE: 17 BRPM

## 2024-01-02 PROCEDURE — 99284 EMERGENCY DEPT VISIT MOD MDM: CPT

## 2024-01-02 PROCEDURE — 99283 EMERGENCY DEPT VISIT LOW MDM: CPT | Mod: 25

## 2024-01-02 PROCEDURE — 96372 THER/PROPH/DIAG INJ SC/IM: CPT

## 2024-01-02 RX ORDER — IBUPROFEN 200 MG
1 TABLET ORAL
Qty: 30 | Refills: 0
Start: 2024-01-02

## 2024-01-02 RX ORDER — KETOROLAC TROMETHAMINE 30 MG/ML
30 SYRINGE (ML) INJECTION ONCE
Refills: 0 | Status: DISCONTINUED | OUTPATIENT
Start: 2024-01-02 | End: 2024-01-02

## 2024-01-02 RX ORDER — OXYCODONE AND ACETAMINOPHEN 5; 325 MG/1; MG/1
1 TABLET ORAL
Qty: 10 | Refills: 0
Start: 2024-01-02

## 2024-01-02 RX ORDER — LIDOCAINE 4 G/100G
1 CREAM TOPICAL ONCE
Refills: 0 | Status: COMPLETED | OUTPATIENT
Start: 2024-01-02 | End: 2024-01-02

## 2024-01-02 RX ORDER — LIDOCAINE 4 G/100G
1 CREAM TOPICAL
Qty: 1 | Refills: 0
Start: 2024-01-02

## 2024-01-02 RX ADMIN — Medication 30 MILLIGRAM(S): at 21:05

## 2024-01-02 RX ADMIN — LIDOCAINE 1 PATCH: 4 CREAM TOPICAL at 21:05

## 2024-01-02 NOTE — ED PROVIDER NOTE - PATIENT PORTAL LINK FT
You can access the FollowMyHealth Patient Portal offered by [facility] by registering at the following website: [email or web address]. By joining [employer]’s FollowMyHealth portal, you will also be able to view your health information using other applications (apps) compatible with our system. You can access the FollowMyHealth Patient Portal offered by [facility] by registering at the following website: [email or web address]. By joining [employer]’s FollowMyHealth portal, you will also be able to view your health information using other applications (apps) compatible with our system.

## 2024-01-02 NOTE — ED PROVIDER NOTE - NSFOLLOWUPINSTRUCTIONS_ED_ALL_ED_FT
Acute Back Pain, Adult  Acute back pain is sudden and usually short-lived. It is often caused by an injury to the muscles and tissues in the back. The injury may result from:  A muscle, tendon, or ligament getting overstretched or torn. Ligaments are tissues that connect bones to each other. Lifting something improperly can cause a back strain.  Wear and tear (degeneration) of the spinal disks. Spinal disks are circular tissue that provide cushioning between the bones of the spine (vertebrae).  Twisting motions, such as while playing sports or doing yard work.  A hit to the back.  Arthritis.  You may have a physical exam, lab tests, and imaging tests to find the cause of your pain. Acute back pain usually goes away with rest and home care.    Follow these instructions at home:  Managing pain, stiffness, and swelling    Take over-the-counter and prescription medicines only as told by your health care provider. Treatment may include medicines for pain and inflammation that are taken by mouth or applied to the skin, or muscle relaxants.  Your health care provider may recommend applying ice during the first 24–48 hours after your pain starts. To do this:  Put ice in a plastic bag.  Place a towel between your skin and the bag.  Leave the ice on for 20 minutes, 2–3 times a day.  Remove the ice if your skin turns bright red. This is very important. If you cannot feel pain, heat, or cold, you have a greater risk of damage to the area.  If directed, apply heat to the affected area as often as told by your health care provider. Use the heat source that your health care provider recommends, such as a moist heat pack or a heating pad.  Place a towel between your skin and the heat source.  Leave the heat on for 20–30 minutes.  Remove the heat if your skin turns bright red. This is especially important if you are unable to feel pain, heat, or cold. You have a greater risk of getting burned.  Activity    Comparisons of good and bad posture while driving, standing, sitting at a desk, and lifting heavy objects.  Do not stay in bed. Staying in bed for more than 1–2 days can delay your recovery.  Sit up and stand up straight. Avoid leaning forward when you sit or hunching over when you stand.  If you work at a desk, sit close to it so you do not need to lean over. Keep your chin tucked in. Keep your neck drawn back, and keep your elbows bent at a 90-degree angle (right angle).  Sit high and close to the steering wheel when you drive. Add lower back (lumbar) support to your car seat, if needed.  Take short walks on even surfaces as soon as you are able. Try to increase the length of time you walk each day.  Do not sit, drive, or  one place for more than 30 minutes at a time. Sitting or standing for long periods of time can put stress on your back.  Do not drive or use heavy machinery while taking prescription pain medicine.  Use proper lifting techniques. When you bend and lift, use positions that put less stress on your back:  Bend your knees.  Keep the load close to your body.  Avoid twisting.  Exercise regularly as told by your health care provider. Exercising helps your back heal faster and helps prevent back injuries by keeping muscles strong and flexible.  Work with a physical therapist to make a safe exercise program, as recommended by your health care provider. Do any exercises as told by your physical therapist.  Lifestyle    Maintain a healthy weight. Extra weight puts stress on your back and makes it difficult to have good posture.  Avoid activities or situations that make you feel anxious or stressed. Stress and anxiety increase muscle tension and can make back pain worse. Learn ways to manage anxiety and stress, such as through exercise.  General instructions    Sleep on a firm mattress in a comfortable position. Try lying on your side with your knees slightly bent. If you lie on your back, put a pillow under your knees.  Keep your head and neck in a straight line with your spine (neutral position) when using electronic equipment like smartphones or pads. To do this:  Raise your smartphone or pad to look at it instead of bending your head or neck to look down.  Put the smartphone or pad at the level of your face while looking at the screen.  Follow your treatment plan as told by your health care provider. This may include:  Cognitive or behavioral therapy.  Acupuncture or massage therapy.  Meditation or yoga.  Contact a health care provider if:  You have pain that is not relieved with rest or medicine.  You have increasing pain going down into your legs or buttocks.  Your pain does not improve after 2 weeks.  You have pain at night.  You lose weight without trying.  You have a fever or chills.  You develop nausea or vomiting.  You develop abdominal pain.  Get help right away if:  You develop new bowel or bladder control problems.  You have unusual weakness or numbness in your arms or legs.  You feel faint.  These symptoms may represent a serious problem that is an emergency. Do not wait to see if the symptoms will go away. Get medical help right away. Call your local emergency services (911 in the [place] Do not drive yourself to the hospital.    Summary  Acute back pain is sudden and usually short-lived.  Use proper lifting techniques. When you bend and lift, use positions that put less stress on your back.  Take over-the-counter and prescription medicines only as told by your health care provider, and apply heat or ice as told.  This information is not intended to replace advice given to you by your health care provider. Make sure you discuss any questions you have with your health care provider.    Document Revised: 03/11/2022 Document Reviewed: 03/11/2022  Elsevier Patient Education © 2023 [employer]

## 2024-01-02 NOTE — ED PROVIDER NOTE - OBJECTIVE STATEMENT
Where is 77 home continue to try to call the nurse for the transfer center note the return call us tell me admissions we have today to 2 admissions 1 transfer history of prior lower back pain presents with exacerbation of left lower back pain rating down the left leg over the last for 5 days or so he took some Tylenol without relief.  He has been here a year ago for similar symptoms was given injection which helped that his doctor sent him for physical therapy.  Denies any bladder or bowel incontinence or any numbness or weakness in the legs.He states pain started after he was sitting down and then twisted to the right to pick something up.

## 2024-01-02 NOTE — ED ADULT NURSE NOTE - NSFALLUNIVINTERV_ED_ALL_ED
Bed/Stretcher in lowest position, wheels locked, appropriate side rails in place/Call bell, personal items and telephone in reach/Instruct patient to call for assistance before getting out of bed/chair/stretcher/Non-slip footwear applied when patient is off stretcher/[place] to call system/Physically safe environment - no spills, clutter or unnecessary equipment/Purposeful proactive rounding/Room/bathroom lighting operational, light cord in reach Bed/Stretcher in lowest position, wheels locked, appropriate side rails in place/Call bell, personal items and telephone in reach/Instruct patient to call for assistance before getting out of bed/chair/stretcher/Non-slip footwear applied when patient is off stretcher/[place] to call system/Physically safe environment - no spills, clutter or unnecessary equipment/Purposeful proactive rounding/Room/bathroom lighting operational, light cord in reach

## 2024-01-02 NOTE — ED PROVIDER NOTE - PHYSICAL EXAMINATION
3.  Callus foot left lower paraspinal tenderness to palpation.  No midline spine presents with patient ambulating with steady gait strength sensation intact in arms and legs.

## 2024-01-02 NOTE — ED PROVIDER NOTE - CLINICAL SUMMARY MEDICAL DECISION MAKING FREE TEXT BOX
Patient with left lower back pain feeling better with Toradol Percocet and lidocaine patch home with prescriptions and PCP follow-up no red flags or concerning symptoms no need for any imaging at this time.

## 2024-01-05 ENCOUNTER — EMERGENCY (EMERGENCY)
Facility: HOSPITAL | Age: 49
LOS: 1 days | Discharge: HOME | End: 2024-01-05
Attending: STUDENT IN AN ORGANIZED HEALTH CARE EDUCATION/TRAINING PROGRAM
Payer: MEDICAID

## 2024-01-05 ENCOUNTER — APPOINTMENT (OUTPATIENT)
Dept: UROLOGY | Facility: CLINIC | Age: 49
End: 2024-01-05
Payer: MEDICAID

## 2024-01-05 ENCOUNTER — APPOINTMENT (OUTPATIENT)
Dept: ULTRASOUND IMAGING | Facility: CLINIC | Age: 49
End: 2024-01-05

## 2024-01-05 ENCOUNTER — OUTPATIENT (OUTPATIENT)
Dept: OUTPATIENT SERVICES | Facility: HOSPITAL | Age: 49
LOS: 1 days | End: 2024-01-05
Payer: SELF-PAY

## 2024-01-05 VITALS
RESPIRATION RATE: 18 BRPM | OXYGEN SATURATION: 99 % | HEART RATE: 72 BPM | TEMPERATURE: 98 F | DIASTOLIC BLOOD PRESSURE: 73 MMHG | SYSTOLIC BLOOD PRESSURE: 118 MMHG | HEIGHT: 65 IN | WEIGHT: 164.02 LBS

## 2024-01-05 VITALS
HEIGHT: 65 IN | HEART RATE: 73 BPM | OXYGEN SATURATION: 98 % | WEIGHT: 169 LBS | BODY MASS INDEX: 28.16 KG/M2 | SYSTOLIC BLOOD PRESSURE: 120 MMHG | TEMPERATURE: 97.2 F | DIASTOLIC BLOOD PRESSURE: 28 MMHG | RESPIRATION RATE: 18 BRPM

## 2024-01-05 DIAGNOSIS — Z00.00 ENCOUNTER FOR GENERAL ADULT MEDICAL EXAMINATION WITHOUT ABNORMAL FINDINGS: ICD-10-CM

## 2024-01-05 LAB
APPEARANCE UR: CLEAR — SIGNIFICANT CHANGE UP
APPEARANCE UR: CLEAR — SIGNIFICANT CHANGE UP
BACTERIA # UR AUTO: ABNORMAL /HPF
BACTERIA # UR AUTO: ABNORMAL /HPF
BILIRUB UR-MCNC: NEGATIVE — SIGNIFICANT CHANGE UP
BILIRUB UR-MCNC: NEGATIVE — SIGNIFICANT CHANGE UP
COLOR SPEC: YELLOW — SIGNIFICANT CHANGE UP
COLOR SPEC: YELLOW — SIGNIFICANT CHANGE UP
DIFF PNL FLD: NEGATIVE — SIGNIFICANT CHANGE UP
DIFF PNL FLD: NEGATIVE — SIGNIFICANT CHANGE UP
GLUCOSE UR QL: NEGATIVE MG/DL — SIGNIFICANT CHANGE UP
GLUCOSE UR QL: NEGATIVE MG/DL — SIGNIFICANT CHANGE UP
KETONES UR-MCNC: NEGATIVE MG/DL — SIGNIFICANT CHANGE UP
KETONES UR-MCNC: NEGATIVE MG/DL — SIGNIFICANT CHANGE UP
LEUKOCYTE ESTERASE UR-ACNC: NEGATIVE — SIGNIFICANT CHANGE UP
LEUKOCYTE ESTERASE UR-ACNC: NEGATIVE — SIGNIFICANT CHANGE UP
NITRITE UR-MCNC: NEGATIVE — SIGNIFICANT CHANGE UP
NITRITE UR-MCNC: NEGATIVE — SIGNIFICANT CHANGE UP
PH UR: 6 — SIGNIFICANT CHANGE UP (ref 5–8)
PH UR: 6 — SIGNIFICANT CHANGE UP (ref 5–8)
PROT UR-MCNC: NEGATIVE MG/DL — SIGNIFICANT CHANGE UP
PROT UR-MCNC: NEGATIVE MG/DL — SIGNIFICANT CHANGE UP
RBC CASTS # UR COMP ASSIST: 3 /HPF — SIGNIFICANT CHANGE UP (ref 0–4)
RBC CASTS # UR COMP ASSIST: 3 /HPF — SIGNIFICANT CHANGE UP (ref 0–4)
SP GR SPEC: 1.01 — SIGNIFICANT CHANGE UP (ref 1–1.03)
SP GR SPEC: 1.01 — SIGNIFICANT CHANGE UP (ref 1–1.03)
UROBILINOGEN FLD QL: 0.2 MG/DL — SIGNIFICANT CHANGE UP (ref 0.2–1)
UROBILINOGEN FLD QL: 0.2 MG/DL — SIGNIFICANT CHANGE UP (ref 0.2–1)
WBC UR QL: 4 /HPF — SIGNIFICANT CHANGE UP (ref 0–5)
WBC UR QL: 4 /HPF — SIGNIFICANT CHANGE UP (ref 0–5)

## 2024-01-05 PROCEDURE — 76775 US EXAM ABDO BACK WALL LIM: CPT

## 2024-01-05 PROCEDURE — 99284 EMERGENCY DEPT VISIT MOD MDM: CPT

## 2024-01-05 PROCEDURE — 99213 OFFICE O/P EST LOW 20 MIN: CPT

## 2024-01-05 PROCEDURE — 99283 EMERGENCY DEPT VISIT LOW MDM: CPT | Mod: 25

## 2024-01-05 PROCEDURE — 96372 THER/PROPH/DIAG INJ SC/IM: CPT

## 2024-01-05 PROCEDURE — G0463: CPT

## 2024-01-05 PROCEDURE — 81001 URINALYSIS AUTO W/SCOPE: CPT

## 2024-01-05 RX ORDER — ACETAMINOPHEN 500 MG
650 TABLET ORAL ONCE
Refills: 0 | Status: COMPLETED | OUTPATIENT
Start: 2024-01-05 | End: 2024-01-05

## 2024-01-05 RX ORDER — KETOROLAC TROMETHAMINE 30 MG/ML
15 SYRINGE (ML) INJECTION ONCE
Refills: 0 | Status: DISCONTINUED | OUTPATIENT
Start: 2024-01-05 | End: 2024-01-05

## 2024-01-05 RX ORDER — DIAZEPAM 5 MG
2 TABLET ORAL ONCE
Refills: 0 | Status: DISCONTINUED | OUTPATIENT
Start: 2024-01-05 | End: 2024-01-05

## 2024-01-05 RX ORDER — CYCLOBENZAPRINE HYDROCHLORIDE 10 MG/1
5 TABLET, FILM COATED ORAL ONCE
Refills: 0 | Status: COMPLETED | OUTPATIENT
Start: 2024-01-05 | End: 2024-01-05

## 2024-01-05 RX ORDER — LIDOCAINE 4 G/100G
1 CREAM TOPICAL ONCE
Refills: 0 | Status: COMPLETED | OUTPATIENT
Start: 2024-01-05 | End: 2024-01-05

## 2024-01-05 RX ADMIN — Medication 15 MILLIGRAM(S): at 22:58

## 2024-01-05 RX ADMIN — LIDOCAINE 1 PATCH: 4 CREAM TOPICAL at 22:29

## 2024-01-05 RX ADMIN — Medication 650 MILLIGRAM(S): at 22:28

## 2024-01-05 RX ADMIN — CYCLOBENZAPRINE HYDROCHLORIDE 5 MILLIGRAM(S): 10 TABLET, FILM COATED ORAL at 22:28

## 2024-01-05 RX ADMIN — Medication 15 MILLIGRAM(S): at 22:28

## 2024-01-05 RX ADMIN — Medication 650 MILLIGRAM(S): at 22:58

## 2024-01-05 RX ADMIN — Medication 2 MILLIGRAM(S): at 22:28

## 2024-01-05 NOTE — PHYSICAL EXAM
[General Appearance - Well Developed] : well developed [General Appearance - Well Nourished] : well nourished [] : no respiratory distress [Normal Station and Gait] : the gait and station were normal for the patient's age [No Focal Deficits] : no focal deficits [Not Anxious] : not anxious

## 2024-01-05 NOTE — ED ADULT TRIAGE NOTE - CCCP TRG CHIEF CMPLNT
Is This A New Presentation, Or A Follow-Up?: Follow Up Basal Cell Carcinoma
Location From Outside Provider (Will Override Previously Chosen Location): Right superior upper back
When Was Basal Cell Biopsied? (Optional): 4-7-22
Accession # (Optional): [id number]
LEFT/flank pain

## 2024-01-05 NOTE — ED ADULT NURSE NOTE - OBJECTIVE STATEMENT
Patient c/o Left flank pain x1 week. Patient denies  symptoms, NVD or fever. Patient reports PMH of kidney stones, with a sonogram done today as outpatient.

## 2024-01-05 NOTE — ED ADULT NURSE NOTE - NSFALLUNIVINTERV_ED_ALL_ED
Bed/Stretcher in lowest position, wheels locked, appropriate side rails in place/Call bell, personal items and telephone in reach/Instruct patient to call for assistance before getting out of bed/chair/stretcher/Non-slip footwear applied when patient is off stretcher/[place] to call system/Physically safe environment - no spills, clutter or unnecessary equipment/Purposeful proactive rounding/Room/bathroom lighting operational, light cord in reach Bed/Stretcher in lowest position, wheels locked, appropriate side rails in place/Call bell, personal items and telephone in reach/Instruct patient to call for assistance before getting out of bed/chair/stretcher/Non-slip footwear applied when patient is off stretcher/[place] to call system/Physically safe environment - no spills, clutter or unnecessary equipment/Purposeful proactive rounding/Room/bathroom lighting operational, light cord in reach

## 2024-01-05 NOTE — ASSESSMENT
[FreeTextEntry1] : 48 year old male with bilateral renal calculi s/p bilateral URS 3/22.Stone analysis March, 2022 showing 70% CaOxalate, 30% CaPhos  Plan  - Renal bladder ultrasound images reviewed from 11/2022, bilateral nonobstructing small subcentimenter (measuring 0.6cm) present bilaterally. - Discussed stone prevention with increase water intake, decreased salt, and specific dietary changes to prevent calcium oxalate stones. -Repeat US, will call patient with results -RTC in 6 months or earlier if needed depending on US results

## 2024-01-05 NOTE — HISTORY OF PRESENT ILLNESS
[FreeTextEntry1] : 48 year old male with bilateral renal calculi now s/p bilateral URS. Ureteral stents removed on 3/22/22.  He presents today for follow up after surveillance ultrasound. Patient reports that the litholink test was very expensive, he does not wish to have another one as he is unable to pay such a high cost.  8/5: Ordered renal bladder ultrasound for nephrolithiasis surveillance. Patient does not want to do another LItholink evaluation due to the prohibitive cost of the examination.  9/9: patient presents for follow up to discuss ultrasound results and for nephrolithiasis management. Ultrasound reviewed , showing 4mm nonobstructing kidney stones bilaterally. Discussed results with patient. he reports continued symptoms of vague lower back pain that appears musculoskeletal. Denies dysuria gross hematuria, fever and chills. Overall feeling well.  1/5/23: Pain on left flank started Saturday, says that it hurts when he walks, seems musculoskeletal in nature. Ultrasound 11/22 showed bilateral mid pole stones measuring 6 mm each with no hydro. Denies dysuria gross hematuria, fever and chills.

## 2024-01-06 VITALS
DIASTOLIC BLOOD PRESSURE: 66 MMHG | OXYGEN SATURATION: 98 % | RESPIRATION RATE: 18 BRPM | HEART RATE: 60 BPM | SYSTOLIC BLOOD PRESSURE: 108 MMHG | TEMPERATURE: 98 F

## 2024-01-06 RX ORDER — CYCLOBENZAPRINE HYDROCHLORIDE 10 MG/1
1 TABLET, FILM COATED ORAL
Qty: 12 | Refills: 0
Start: 2024-01-06 | End: 2024-01-09

## 2024-01-06 NOTE — ED PROVIDER NOTE - CLINICAL SUMMARY MEDICAL DECISION MAKING FREE TEXT BOX
48M PMH kidney stones presents to the ED with L flank pain just over the L iliac spine laterally that began after pt twisted his body to the right a few days ago. Came here a few days ago and was given percoset and ibuprofen that he states doesn't relieve his pain that much. Pt states it hurts when he walks. Denies midline back pain or pain shooting down the leg below the knee, denies leg weakness, denies change in bowel /bladder habits or saddle anesthesia. Pt reportedly had an ultrasund done that he will get results for this week and had labwork done today as well. Pt denies fevers/chills, cough/chest pain, abd pain, n/v/d, urinary sxs.    ID [id number]    ALL SYSTEMS REVIEWED AND NEGATIVE EXCEPT AS ABOVE IN THE HISTORY     GENERAL: no acute distress, non-toxic appearing  HEENT: normal conjunctiva, oral mucosa moist  CARDIAC: regular rate and regular rhythm, 2+ dp pulses bilaterally  PULM: clear to ascultation bilaterally, no increased work of breathing  GI: abdomen nondistended, soft, nontender  : no CVA tenderness, no suprapubic tenderness  NEURO: alert and oriented x 3, normal speech, 5/5 strength bilateral lower extremities with no long tract signs  MSK: no visible deformities, no peripheral edema, no midline spine tenderness/stepoffs  SKIN: no visible rashes  PSYCH: appropriate mood and affect     Most likely MSK related given mechanism of injury, low suspicion for radiculopathy vs kidney stone. Plan for analgesia and check urine.    ===================================  update (Victor Hugo Andrade DO; emergency medicine physician): urine without significant RBC. Pt states his pain is improved. Pt is following up with PCP this week to get results for ultrasound/labs that he did. He states that his pcp is also planning to order an MRI of the area of pain and I also encouraged a low back MRI. 48M PMH kidney stones presents to the ED with L flank pain just over the L iliac spine laterally that began after pt twisted his body to the right a few days ago. Came here a few days ago and was given percoset and ibuprofen that he states doesn't relieve his pain that much. Pt states it hurts when he walks. Denies midline back pain or pain shooting down the leg below the knee, denies leg weakness, denies change in bowel /bladder habits or saddle anesthesia. Pt reportedly had an ultrasund done that he will get results for this week and had labwork done today as well. Pt denies fevers/chills, cough/chest pain, abd pain, n/v/d, urinary sxs.    ID [id number]    ALL SYSTEMS REVIEWED AND NEGATIVE EXCEPT AS ABOVE IN THE HISTORY     GENERAL: no acute distress, non-toxic appearing  HEENT: normal conjunctiva, oral mucosa moist  CARDIAC: regular rate and regular rhythm, 2+ dp pulses bilaterally  PULM: clear to ascultation bilaterally, no increased work of breathing  GI: abdomen nondistended, soft, nontender  : no CVA tenderness, no suprapubic tenderness  NEURO: alert and oriented x 3, normal speech, 5/5 strength bilateral lower extremities with no long tract signs  MSK: no visible deformities, no peripheral edema, no midline spine tenderness/stepoffs  SKIN: no visible rashes  PSYCH: appropriate mood and affect     Most likely MSK related given mechanism of injury, low suspicion for radiculopathy vs kidney stone. Plan for analgesia and check urine.    ===================================  update (Victor Hugo Andrade DO; emergency medicine physician): urine without significant RBC. Pt states his pain is improved. Pt is following up with PCP this week to get results for ultrasound/labs that he did. He states that his pcp is also planning to order an MRI of the area of pain and I also encouraged a low back MRI. 48M PMH kidney stones presents to the ED with L flank pain just over the L iliac spine laterally that began after pt twisted his body to the right a few days ago. Came here a few days ago and was given percoset and ibuprofen that he states doesn't relieve his pain that much. Pt states it hurts when he walks. Denies midline back pain or pain shooting down the leg below the knee, denies leg weakness, denies change in bowel /bladder habits or saddle anesthesia. Pt reportedly had an ultrasund done that he will get results for this week and had labwork done today as well. Pt denies fevers/chills, cough/chest pain, abd pain, n/v/d, urinary sxs.    ID [id number].     ALL SYSTEMS REVIEWED AND NEGATIVE EXCEPT AS ABOVE IN THE HISTORY     GENERAL: no acute distress, non-toxic appearing  HEENT: normal conjunctiva, oral mucosa moist  CARDIAC: regular rate and regular rhythm, 2+ dp pulses bilaterally  PULM: clear to ascultation bilaterally, no increased work of breathing  GI: abdomen nondistended, soft, nontender  : no CVA tenderness, no suprapubic tenderness  NEURO: alert and oriented x 3, normal speech, 5/5 strength bilateral lower extremities with no long tract signs  MSK: no visible deformities, no peripheral edema, no midline spine tenderness/stepoffs  SKIN: no visible rashes  PSYCH: appropriate mood and affect     Most likely MSK related given mechanism of injury, low suspicion for radiculopathy vs kidney stone. Plan for analgesia and check urine. Reassess.    ===================================  update (Victor Hugo Andrade DO; emergency medicine physician): urine without significant RBCs and low overall suspicion for kidney stones, will not pursue further. Pt states his pain is improved after meds given in ED. Pt is following up with PCP this week to get results for ultrasound/labs that he did. He states that his pcp is also planning to order an MRI of the area of pain and I also encouraged a low back MRI. Sent meds to pharmacy and counseled pt on the need to be careful taking his percoset and cyclobenzaprine together as they both may cause sedation and pt expresses verbal understanding. Pt is safe for outpt f/u. 48M PMH kidney stones presents to the ED with L flank pain just over the L iliac spine laterally that began after pt twisted his body to the right a few days ago. Came here a few days ago and was given percoset and ibuprofen that he states doesn't relieve his pain that much. Pt states it hurts when he walks. Denies midline back pain or pain shooting down the leg below the knee, denies leg weakness, denies change in bowel /bladder habits or saddle anesthesia. Pt reportedly had an ultrasund done that he will get results for this week and had labwork done today as well. Pt denies fevers/chills, cough/chest pain, abd pain, n/v/d, urinary sxs.    ID [id number].     ALL SYSTEMS REVIEWED AND NEGATIVE EXCEPT AS ABOVE IN THE HISTORY     GENERAL: no acute distress, non-toxic appearing  HEENT: normal conjunctiva, oral mucosa moist  CARDIAC: regular rate and regular rhythm, 2+ dp pulses bilaterally  PULM: clear to ascultation bilaterally, no increased work of breathing  GI: abdomen nondistended, soft, nontender  : no CVA tenderness, no suprapubic tenderness  NEURO: alert and oriented x 3, normal speech, 5/5 strength bilateral lower extremities with no long tract signs  MSK: no visible deformities, no peripheral edema, no midline spine tenderness/stepoffs  SKIN: no visible rashes  PSYCH: appropriate mood and affect     Most likely MSK related given mechanism of injury, low suspicion for radiculopathy vs kidney stone. Plan for analgesia and check urine. Reassess.    ===================================  update (Victor Hugo Andrade DO; emergency medicine physician): urine without significant RBCs and low overall suspicion for kidney stones, will not pursue further. Pt states his pain is improved after meds given in ED. Pt is following up with PCP this week to get results for ultrasound/labs that he did. He states that his pcp is also planning to order an MRI of the area of pain and I also encouraged a low back MRI. Sent meds to pharmacy and counseled pt on the need to be careful taking his percoset and cyclobenzaprine together as they both may cause sedation and pt expresses verbal understanding. Pt is safe for outpt f/u.

## 2024-01-06 NOTE — ED PROVIDER NOTE - PATIENT PORTAL LINK FT
You can access the FollowMyHealth Patient Portal offered by [facility] by registering at the following website: [email or web address]. By joining [employer]’s FollowMyHealth portal, you will also be able to view your health information using other applications (apps) compatible with our system. You can access the FollowMyHealth Patient Portal offered by [facility] by registering at the following website: [email or web address]. By joining [employer]’s FollowMyHealth portal, you will also be able to view your health information using other applications (apps) compatible with our system.

## 2024-01-06 NOTE — ED PROVIDER NOTE - NSFOLLOWUPINSTRUCTIONS_ED_ALL_ED_FT
- Please follow up with your Primary Care Doctor within 1-2 weeks. Bring your results from today.    - Tylenol up to 650 mg every 6 hours as needed for pain and/or Ibuprofen up to 600 mg every 6 hours as needed for pain.    - You may continue to take the meds you were prescribed for pain just be mindful that they have Acetaminophen in them (DON'T take more than 1,000mg of acetaminophen every 6 hours as this may cause liver injury).    - Take prescribed medication as indicated:  Cyclobenzaprine    - You may also try over the counter Lidocaine patch (ie. Salonpas) --- read package for dosing instructions.    - Be sure to return to the ED if you develop new, worsening, or any distressing symptoms.

## 2024-01-06 NOTE — ED PROVIDER NOTE - OBJECTIVE STATEMENT
48M PMH kidney stones presents to the ED with L flank pain just over the L iliac spine laterally that began after pt twisted his body to the right a few days ago. Came here a few days ago and was given percoset and ibuprofen that he states doesn't relieve his pain that much. Pt states it hurts when he walks. Denies midline back pain or pain shooting down the leg below the knee, denies leg weakness, denies change in bowel /bladder habits or saddle anesthesia. Pt reportedly had an ultrasund done that he will get results for this week and had labwork done today as well. Pt denies fevers/chills, cough/chest pain, abd pain, n/v/d, urinary sxs.    ID [id number] 48M PMH kidney stones presents to the ED with L flank pain just over the L iliac spine laterally that began after pt twisted his body to the right a few days ago. Came here a few days ago and was given percoset and ibuprofen that he states doesn't relieve his pain that much. Pt states it hurts when he walks. Denies midline back pain or pain shooting down the leg below the knee, denies leg weakness, denies change in bowel /bladder habits or saddle anesthesia. Pt reportedly had an ultrasund done that he will get results for this week and had labwork done today as well. Pt denies fevers/chills, cough/chest pain, abd pain, n/v/d, urinary sxs.    ID [id number]

## 2024-01-06 NOTE — ED PROVIDER NOTE - NSICDXPASTMEDICALHX_GEN_ALL_CORE_FT
Appointment scheduled with Dr Jacinto.            ----- Message from Jennifer Salmeron sent at 6/16/2023  8:05 AM CDT -----  Regarding: Appt  Contact: [phone]  Patients mom Rosa is calling. Patient has an ingrown toenail and would like to have patient seen today. Please call mom at [phone]     PAST MEDICAL HISTORY:  BPH (benign prostatic hyperplasia)     Kidney stones

## 2024-01-08 DIAGNOSIS — N20.0 CALCULUS OF KIDNEY: ICD-10-CM

## 2024-01-09 ENCOUNTER — OUTPATIENT (OUTPATIENT)
Dept: OUTPATIENT SERVICES | Facility: HOSPITAL | Age: 49
LOS: 1 days | End: 2024-01-09
Payer: SELF-PAY

## 2024-01-09 ENCOUNTER — APPOINTMENT (OUTPATIENT)
Dept: GASTROENTEROLOGY | Facility: HOSPITAL | Age: 49
End: 2024-01-09
Payer: COMMERCIAL

## 2024-01-09 ENCOUNTER — EMERGENCY (EMERGENCY)
Facility: HOSPITAL | Age: 49
LOS: 1 days | Discharge: HOME | End: 2024-01-09
Attending: EMERGENCY MEDICINE
Payer: MEDICAID

## 2024-01-09 VITALS
SYSTOLIC BLOOD PRESSURE: 133 MMHG | HEART RATE: 77 BPM | WEIGHT: 169 LBS | DIASTOLIC BLOOD PRESSURE: 89 MMHG | RESPIRATION RATE: 14 BRPM | TEMPERATURE: 97 F | HEIGHT: 65 IN | BODY MASS INDEX: 28.16 KG/M2

## 2024-01-09 VITALS
DIASTOLIC BLOOD PRESSURE: 87 MMHG | HEIGHT: 65 IN | HEART RATE: 76 BPM | SYSTOLIC BLOOD PRESSURE: 128 MMHG | RESPIRATION RATE: 17 BRPM | TEMPERATURE: 99 F | OXYGEN SATURATION: 98 %

## 2024-01-09 DIAGNOSIS — R10.9 UNSPECIFIED ABDOMINAL PAIN: ICD-10-CM

## 2024-01-09 LAB
ALBUMIN SERPL ELPH-MCNC: 4.1 G/DL — SIGNIFICANT CHANGE UP (ref 3.3–5)
ALBUMIN SERPL ELPH-MCNC: 4.1 G/DL — SIGNIFICANT CHANGE UP (ref 3.3–5)
ALP SERPL-CCNC: 123 U/L — HIGH (ref 40–120)
ALP SERPL-CCNC: 123 U/L — HIGH (ref 40–120)
ALT FLD-CCNC: 42 U/L — SIGNIFICANT CHANGE UP (ref 10–45)
ALT FLD-CCNC: 42 U/L — SIGNIFICANT CHANGE UP (ref 10–45)
ANION GAP SERPL CALC-SCNC: 10 MMOL/L — SIGNIFICANT CHANGE UP (ref 5–17)
ANION GAP SERPL CALC-SCNC: 10 MMOL/L — SIGNIFICANT CHANGE UP (ref 5–17)
APPEARANCE UR: CLEAR — SIGNIFICANT CHANGE UP
APPEARANCE UR: CLEAR — SIGNIFICANT CHANGE UP
AST SERPL-CCNC: 29 U/L — SIGNIFICANT CHANGE UP (ref 10–40)
AST SERPL-CCNC: 29 U/L — SIGNIFICANT CHANGE UP (ref 10–40)
BACTERIA # UR AUTO: NEGATIVE /HPF — SIGNIFICANT CHANGE UP
BACTERIA # UR AUTO: NEGATIVE /HPF — SIGNIFICANT CHANGE UP
BASOPHILS # BLD AUTO: 0.03 K/UL — SIGNIFICANT CHANGE UP (ref 0–0.2)
BASOPHILS # BLD AUTO: 0.03 K/UL — SIGNIFICANT CHANGE UP (ref 0–0.2)
BASOPHILS NFR BLD AUTO: 0.4 % — SIGNIFICANT CHANGE UP (ref 0–2)
BASOPHILS NFR BLD AUTO: 0.4 % — SIGNIFICANT CHANGE UP (ref 0–2)
BILIRUB SERPL-MCNC: 0.4 MG/DL — SIGNIFICANT CHANGE UP (ref 0.2–1.2)
BILIRUB SERPL-MCNC: 0.4 MG/DL — SIGNIFICANT CHANGE UP (ref 0.2–1.2)
BILIRUB UR-MCNC: NEGATIVE — SIGNIFICANT CHANGE UP
BILIRUB UR-MCNC: NEGATIVE — SIGNIFICANT CHANGE UP
BUN SERPL-MCNC: 15 MG/DL — SIGNIFICANT CHANGE UP (ref 7–23)
BUN SERPL-MCNC: 15 MG/DL — SIGNIFICANT CHANGE UP (ref 7–23)
CALCIUM SERPL-MCNC: 8.9 MG/DL — SIGNIFICANT CHANGE UP (ref 8.4–10.5)
CALCIUM SERPL-MCNC: 8.9 MG/DL — SIGNIFICANT CHANGE UP (ref 8.4–10.5)
CAST: 0 /LPF — SIGNIFICANT CHANGE UP (ref 0–4)
CAST: 0 /LPF — SIGNIFICANT CHANGE UP (ref 0–4)
CHLORIDE SERPL-SCNC: 105 MMOL/L — SIGNIFICANT CHANGE UP (ref 96–108)
CHLORIDE SERPL-SCNC: 105 MMOL/L — SIGNIFICANT CHANGE UP (ref 96–108)
CO2 SERPL-SCNC: 24 MMOL/L — SIGNIFICANT CHANGE UP (ref 22–31)
CO2 SERPL-SCNC: 24 MMOL/L — SIGNIFICANT CHANGE UP (ref 22–31)
COLOR SPEC: YELLOW — SIGNIFICANT CHANGE UP
COLOR SPEC: YELLOW — SIGNIFICANT CHANGE UP
CREAT SERPL-MCNC: 0.6 MG/DL — SIGNIFICANT CHANGE UP (ref 0.5–1.3)
CREAT SERPL-MCNC: 0.6 MG/DL — SIGNIFICANT CHANGE UP (ref 0.5–1.3)
DIFF PNL FLD: NEGATIVE — SIGNIFICANT CHANGE UP
DIFF PNL FLD: NEGATIVE — SIGNIFICANT CHANGE UP
EGFR: 118 ML/MIN/1.73M2 — SIGNIFICANT CHANGE UP
EGFR: 118 ML/MIN/1.73M2 — SIGNIFICANT CHANGE UP
EOSINOPHIL # BLD AUTO: 0.11 K/UL — SIGNIFICANT CHANGE UP (ref 0–0.5)
EOSINOPHIL # BLD AUTO: 0.11 K/UL — SIGNIFICANT CHANGE UP (ref 0–0.5)
EOSINOPHIL NFR BLD AUTO: 1.6 % — SIGNIFICANT CHANGE UP (ref 0–6)
EOSINOPHIL NFR BLD AUTO: 1.6 % — SIGNIFICANT CHANGE UP (ref 0–6)
GLUCOSE SERPL-MCNC: 88 MG/DL — SIGNIFICANT CHANGE UP (ref 70–99)
GLUCOSE SERPL-MCNC: 88 MG/DL — SIGNIFICANT CHANGE UP (ref 70–99)
GLUCOSE UR QL: NEGATIVE MG/DL — SIGNIFICANT CHANGE UP
GLUCOSE UR QL: NEGATIVE MG/DL — SIGNIFICANT CHANGE UP
HCT VFR BLD CALC: 41.6 % — SIGNIFICANT CHANGE UP (ref 39–50)
HCT VFR BLD CALC: 41.6 % — SIGNIFICANT CHANGE UP (ref 39–50)
HGB BLD-MCNC: 13.8 G/DL — SIGNIFICANT CHANGE UP (ref 13–17)
HGB BLD-MCNC: 13.8 G/DL — SIGNIFICANT CHANGE UP (ref 13–17)
IMM GRANULOCYTES NFR BLD AUTO: 0.3 % — SIGNIFICANT CHANGE UP (ref 0–0.9)
IMM GRANULOCYTES NFR BLD AUTO: 0.3 % — SIGNIFICANT CHANGE UP (ref 0–0.9)
KETONES UR-MCNC: NEGATIVE MG/DL — SIGNIFICANT CHANGE UP
KETONES UR-MCNC: NEGATIVE MG/DL — SIGNIFICANT CHANGE UP
LEUKOCYTE ESTERASE UR-ACNC: NEGATIVE — SIGNIFICANT CHANGE UP
LEUKOCYTE ESTERASE UR-ACNC: NEGATIVE — SIGNIFICANT CHANGE UP
LYMPHOCYTES # BLD AUTO: 1.81 K/UL — SIGNIFICANT CHANGE UP (ref 1–3.3)
LYMPHOCYTES # BLD AUTO: 1.81 K/UL — SIGNIFICANT CHANGE UP (ref 1–3.3)
LYMPHOCYTES # BLD AUTO: 26.9 % — SIGNIFICANT CHANGE UP (ref 13–44)
LYMPHOCYTES # BLD AUTO: 26.9 % — SIGNIFICANT CHANGE UP (ref 13–44)
MCHC RBC-ENTMCNC: 28.6 PG — SIGNIFICANT CHANGE UP (ref 27–34)
MCHC RBC-ENTMCNC: 28.6 PG — SIGNIFICANT CHANGE UP (ref 27–34)
MCHC RBC-ENTMCNC: 33.2 GM/DL — SIGNIFICANT CHANGE UP (ref 32–36)
MCHC RBC-ENTMCNC: 33.2 GM/DL — SIGNIFICANT CHANGE UP (ref 32–36)
MCV RBC AUTO: 86.3 FL — SIGNIFICANT CHANGE UP (ref 80–100)
MCV RBC AUTO: 86.3 FL — SIGNIFICANT CHANGE UP (ref 80–100)
MONOCYTES # BLD AUTO: 0.48 K/UL — SIGNIFICANT CHANGE UP (ref 0–0.9)
MONOCYTES # BLD AUTO: 0.48 K/UL — SIGNIFICANT CHANGE UP (ref 0–0.9)
MONOCYTES NFR BLD AUTO: 7.1 % — SIGNIFICANT CHANGE UP (ref 2–14)
MONOCYTES NFR BLD AUTO: 7.1 % — SIGNIFICANT CHANGE UP (ref 2–14)
NEUTROPHILS # BLD AUTO: 4.27 K/UL — SIGNIFICANT CHANGE UP (ref 1.8–7.4)
NEUTROPHILS # BLD AUTO: 4.27 K/UL — SIGNIFICANT CHANGE UP (ref 1.8–7.4)
NEUTROPHILS NFR BLD AUTO: 63.7 % — SIGNIFICANT CHANGE UP (ref 43–77)
NEUTROPHILS NFR BLD AUTO: 63.7 % — SIGNIFICANT CHANGE UP (ref 43–77)
NITRITE UR-MCNC: NEGATIVE — SIGNIFICANT CHANGE UP
NITRITE UR-MCNC: NEGATIVE — SIGNIFICANT CHANGE UP
NRBC # BLD: 0 /100 WBCS — SIGNIFICANT CHANGE UP (ref 0–0)
NRBC # BLD: 0 /100 WBCS — SIGNIFICANT CHANGE UP (ref 0–0)
PH UR: 6.5 — SIGNIFICANT CHANGE UP (ref 5–8)
PH UR: 6.5 — SIGNIFICANT CHANGE UP (ref 5–8)
PLATELET # BLD AUTO: 302 K/UL — SIGNIFICANT CHANGE UP (ref 150–400)
PLATELET # BLD AUTO: 302 K/UL — SIGNIFICANT CHANGE UP (ref 150–400)
POTASSIUM SERPL-MCNC: 3.9 MMOL/L — SIGNIFICANT CHANGE UP (ref 3.5–5.3)
POTASSIUM SERPL-MCNC: 3.9 MMOL/L — SIGNIFICANT CHANGE UP (ref 3.5–5.3)
POTASSIUM SERPL-SCNC: 3.9 MMOL/L — SIGNIFICANT CHANGE UP (ref 3.5–5.3)
POTASSIUM SERPL-SCNC: 3.9 MMOL/L — SIGNIFICANT CHANGE UP (ref 3.5–5.3)
PROT SERPL-MCNC: 7.2 G/DL — SIGNIFICANT CHANGE UP (ref 6–8.3)
PROT SERPL-MCNC: 7.2 G/DL — SIGNIFICANT CHANGE UP (ref 6–8.3)
PROT UR-MCNC: NEGATIVE MG/DL — SIGNIFICANT CHANGE UP
PROT UR-MCNC: NEGATIVE MG/DL — SIGNIFICANT CHANGE UP
RBC # BLD: 4.82 M/UL — SIGNIFICANT CHANGE UP (ref 4.2–5.8)
RBC # BLD: 4.82 M/UL — SIGNIFICANT CHANGE UP (ref 4.2–5.8)
RBC # FLD: 12.7 % — SIGNIFICANT CHANGE UP (ref 10.3–14.5)
RBC # FLD: 12.7 % — SIGNIFICANT CHANGE UP (ref 10.3–14.5)
RBC CASTS # UR COMP ASSIST: 0 /HPF — SIGNIFICANT CHANGE UP (ref 0–4)
RBC CASTS # UR COMP ASSIST: 0 /HPF — SIGNIFICANT CHANGE UP (ref 0–4)
SODIUM SERPL-SCNC: 139 MMOL/L — SIGNIFICANT CHANGE UP (ref 135–145)
SODIUM SERPL-SCNC: 139 MMOL/L — SIGNIFICANT CHANGE UP (ref 135–145)
SP GR SPEC: 1.01 — SIGNIFICANT CHANGE UP (ref 1–1.03)
SP GR SPEC: 1.01 — SIGNIFICANT CHANGE UP (ref 1–1.03)
SQUAMOUS # UR AUTO: 0 /HPF — SIGNIFICANT CHANGE UP (ref 0–5)
SQUAMOUS # UR AUTO: 0 /HPF — SIGNIFICANT CHANGE UP (ref 0–5)
UROBILINOGEN FLD QL: 0.2 MG/DL — SIGNIFICANT CHANGE UP (ref 0.2–1)
UROBILINOGEN FLD QL: 0.2 MG/DL — SIGNIFICANT CHANGE UP (ref 0.2–1)
WBC # BLD: 6.72 K/UL — SIGNIFICANT CHANGE UP (ref 3.8–10.5)
WBC # BLD: 6.72 K/UL — SIGNIFICANT CHANGE UP (ref 3.8–10.5)
WBC # FLD AUTO: 6.72 K/UL — SIGNIFICANT CHANGE UP (ref 3.8–10.5)
WBC # FLD AUTO: 6.72 K/UL — SIGNIFICANT CHANGE UP (ref 3.8–10.5)
WBC UR QL: 0 /HPF — SIGNIFICANT CHANGE UP (ref 0–5)
WBC UR QL: 0 /HPF — SIGNIFICANT CHANGE UP (ref 0–5)

## 2024-01-09 PROCEDURE — 80053 COMPREHEN METABOLIC PANEL: CPT

## 2024-01-09 PROCEDURE — 74176 CT ABD & PELVIS W/O CONTRAST: CPT | Mod: MA

## 2024-01-09 PROCEDURE — 36415 COLL VENOUS BLD VENIPUNCTURE: CPT

## 2024-01-09 PROCEDURE — 96374 THER/PROPH/DIAG INJ IV PUSH: CPT

## 2024-01-09 PROCEDURE — 85025 COMPLETE CBC W/AUTO DIFF WBC: CPT

## 2024-01-09 PROCEDURE — ZZZZZ: CPT | Mod: GC

## 2024-01-09 PROCEDURE — G0463: CPT

## 2024-01-09 PROCEDURE — 99284 EMERGENCY DEPT VISIT MOD MDM: CPT

## 2024-01-09 PROCEDURE — 74176 CT ABD & PELVIS W/O CONTRAST: CPT | Mod: 26,MA

## 2024-01-09 PROCEDURE — 99284 EMERGENCY DEPT VISIT MOD MDM: CPT | Mod: 25

## 2024-01-09 PROCEDURE — 81001 URINALYSIS AUTO W/SCOPE: CPT

## 2024-01-09 RX ORDER — KETOROLAC TROMETHAMINE 30 MG/ML
15 SYRINGE (ML) INJECTION ONCE
Refills: 0 | Status: DISCONTINUED | OUTPATIENT
Start: 2024-01-09 | End: 2024-01-09

## 2024-01-09 RX ADMIN — Medication 15 MILLIGRAM(S): at 18:41

## 2024-01-09 NOTE — ED PROVIDER NOTE - CLINICAL SUMMARY MEDICAL DECISION MAKING FREE TEXT BOX
RGUJRAL 48-year-old male history of kidney stones presents with left hip, left flank pain.  Patient states he has been evaluated in the emergency department within the week and given Percocet ibuprofen and Flexeril without any relief.  Patient complains of mild left flank pain more at the left hip worse with movement that started while he moved in a chair.  Denies any other trauma.  Denies any fevers chills recent illness IV drug abuse.  Denies any nausea vomiting diarrhea, dysuria hematuria.  Patient states he has been taking his medications without significant relief.  Patient states he had similar symptoms a year ago and was told he may have require an MRI.  On chart review patient was evaluated for left flank pain for kidney stones refer to orthospine that placed patient in physical therapy unclear where his origin of pain is coming from.  On exam, pelvis is stable, left hip positive right motion with pain elicited with internal/external rotation.  No rash.  Patient is neurologically intact with 5 out of 5 strength normal sensation no paresthesias.  2+ dorsalis pedis.  Will obtain screening labs, CT, to evaluate for kidney stone.  Differential diagnosis includes kidney stone, sciatica, piriformis syndrome. Pain control and re eval.

## 2024-01-09 NOTE — END OF VISIT
[] : Fellow [FreeTextEntry3] : As modified and discussed with patient MD GLORIA Pulliam [facility] Associate Professor of Medicine [facility]

## 2024-01-09 NOTE — ASSESSMENT
[FreeTextEntry1] : #Colon cancer screening, due in 2033 #HP s/p quadruple therapy, need to confirm eradication  #IM  #Elevated liver enzymes ALT and alk phosphatase  Plan: -Plan for EGD with gastric mapping. Will confirm HP eradication at that time  -liver disease work up -RUQ US -RTC after EGD  Jeffery Piedra MD GI Fellow

## 2024-01-09 NOTE — REASON FOR VISIT
[Follow-up] : a follow-up of an existing diagnosis [FreeTextEntry1] : intestinal metaplasia of gastric mucosa, H. pylori gastritis

## 2024-01-09 NOTE — PHYSICAL EXAM

## 2024-01-09 NOTE — ED PROVIDER NOTE - PATIENT PORTAL LINK FT
You can access the FollowMyHealth Patient Portal offered by [facility] by registering at the following website: [email or web address]. By joining [employer]’s FollowMyHealth portal, you will also be able to view your health information using other applications (apps) compatible with our system. You can access the FollowMyHealth Patient Portal offered by [facility] by registering at the following website: [email or web address]. By joining [employer]’s FollowMyHealth portal, you will also be able to view your health information using other applications (apps) compatible with our system.

## 2024-01-09 NOTE — ED PROVIDER NOTE - NSFOLLOWUPINSTRUCTIONS_ED_ALL_ED_FT
Follow up with your primary care doctor within 1 week.  Follow up with the [facility] by calling [phone].    *Bring all printed lab/test results to your appointment(s).*    Take ibuprofen 400-600mg every 6 hrs as needed for pain. Take with food  Take acetaminophen 500-1000mg every 6 hrs as needed for pain. DO NOT EXCEED 4000mg DAILY.    Return to the ED for worsening pain not responding to above medications, numbness, tingling, weakness, difficulty urinating/incontinence, numbness in your groin/buttocks area, or any other concerns. Follow up with your primary care doctor within 1 week.  Follow up with the [facility] by calling [phone].    *Bring all printed lab/test results to your appointment(s).*    Take ibuprofen 400-600mg every 6 hrs as needed for pain. Take with food  Take acetaminophen 500-1000mg every 6 hrs as needed for pain. DO NOT EXCEED 4000mg DAILY.    Return to the ED for worsening pain not responding to above medications, numbness, tingling, weakness, difficulty urinating/incontinence, numbness in your groin/buttocks area, or any other concerns.

## 2024-01-09 NOTE — ED PROVIDER NOTE - OBJECTIVE STATEMENT
48-year-old male history nephrolithiasis presents with left hip/back pain for about 1 week.  Pain began while sitting in chair eating he suddenly turned to his right side and felt a "pulling sensation".  Pain is localized to left lower back/hip radiating down left leg laterally when walking stops before the knee.  Was evaluated in several ED's first had UA checked, diagnosed with suspected paraspinal pathology given Percocet, Flexeril, ibuprofen he has been taking intermittently with minimal relief.  Went to another ED 2 days ago with similar evaluation.  Has not had prior spine imaging, was previously seen by orthopedics but never had MRI/CT.  Reports pain does not feel like his prior kidney stones.  Denies associated urinary symptoms including incontinence or retention, fever, chills, saddle anesthesia, extremity numbness/weakness.  last took prescribed analgesics (flexeril, percocet, motrin) @12:00 today  Ervin Mendez [id number] 48-year-old male history nephrolithiasis presents with left hip/back pain for about 1 week.  Pain began while sitting in chair eating he suddenly turned to his right side and felt a "pulling sensation".  Pain is localized to left lower back/hip radiating down left leg laterally when walking stops before the knee.  Was evaluated in several ED's first had UA checked, diagnosed with suspected paraspinal pathology given Percocet, Flexeril, ibuprofen he has been taking intermittently with minimal relief.  Went to another ED 2 days ago with similar evaluation.  Has not had prior spine imaging, was previously seen by orthopedics but never had MRI/CT.  Reports pain does not feel like his prior kidney stones.  Denies associated urinary symptoms including incontinence or retention, fever, chills, saddle anesthesia, extremity numbness/weakness.  last took prescribed analgesics (flexeril, percocet, motrin) @12:00 today  Ervin Mendez [id number]

## 2024-01-09 NOTE — ED ADULT NURSE NOTE - NSFALLUNIVINTERV_ED_ALL_ED
Bed/Stretcher in lowest position, wheels locked, appropriate side rails in place/Call bell, personal items and telephone in reach/Instruct patient to call for assistance before getting out of bed/chair/stretcher/Non-slip footwear applied when patient is off stretcher/[place] to call system/Physically safe environment - no spills, clutter or unnecessary equipment/Purposeful proactive rounding/Room/bathroom lighting operational, light cord in reach Bed/Stretcher in lowest position, wheels locked, appropriate side rails in place/Call bell, personal items and telephone in reach/Instruct patient to call for assistance before getting out of bed/chair/stretcher/Non-slip footwear applied when patient is off stretcher/[place] to call system/Physically safe environment - no spills, clutter or unnecessary equipment/Purposeful proactive rounding/Room/bathroom lighting operational, light cord in reach

## 2024-01-09 NOTE — ED PROVIDER NOTE - MUSCULOSKELETAL, MLM
Spine appears normal with left lower paraspinal lumbar ttp, no midline spinal ttp/deformities. Left lateral hip tenderness and pain on hip flexion/rotation, NEG straight leg raise. No obvious deformities.

## 2024-01-09 NOTE — ED PROVIDER NOTE - PROGRESS NOTE DETAILS
Patient states he is having BM and passing flatus, discussed CT findings and follow up with GI as well. Patient was seen by his GI doctor today. Patient states he is having BM and passing flatus, discussed CT findings and follow up with GI as well. Patient was seen by his GI doctor today. ( ID [id number]) Patient states he is having BM and passing flatus, discussed CT findings and follow up with GI as well. Patient was seen by his GI doctor today. ( ID [id number])

## 2024-01-09 NOTE — HISTORY OF PRESENT ILLNESS
[FreeTextEntry1] : 49 yo M with hx of HP here for follow up. Patient last seen on 2023, underwent EGD/Colonoscopy that showed HP+ and IM. Colonoscopy was normal. No abdominal  complaints at this time. He does report left flank/back pain after twisting his back.   There is no other family history of colon cancer, colon polyp, peptic ulcer disease, female genitourinary disease, liver disease, cholelithiasis, pancreatic disease or cancer, inflammatory bowel disease or celiac disease.

## 2024-01-09 NOTE — ED PROVIDER NOTE - NS ED ROS FT
Date Performed: 05/11/2018       Time Performed: 13:54:10

 

PTAGE:      70 years

 

EKG:      Sinus tachycardia Left ventricular hypertrophy by voltage only Inferior and anterior T wave
 changes are nonspecific Abnormal ECG

 

PREVIOUS TRACING       : 05/09/2018 13.49 Compared to previous tracing,  T wave changes more prominen
t

 

DOCTOR:   Morgan Casas  Interpretating Date/Time  05/11/2018 17:13:47 low back pain, L hip pain

## 2024-01-10 ENCOUNTER — EMERGENCY (EMERGENCY)
Facility: HOSPITAL | Age: 49
LOS: 1 days | Discharge: LEFT BEFORE DISCHARGE | End: 2024-01-10
Attending: EMERGENCY MEDICINE
Payer: MEDICAID

## 2024-01-10 VITALS
SYSTOLIC BLOOD PRESSURE: 129 MMHG | DIASTOLIC BLOOD PRESSURE: 90 MMHG | TEMPERATURE: 99 F | HEART RATE: 70 BPM | OXYGEN SATURATION: 99 % | RESPIRATION RATE: 17 BRPM

## 2024-01-10 VITALS
RESPIRATION RATE: 18 BRPM | WEIGHT: 165.57 LBS | DIASTOLIC BLOOD PRESSURE: 80 MMHG | TEMPERATURE: 98 F | HEART RATE: 75 BPM | HEIGHT: 65 IN | OXYGEN SATURATION: 96 % | SYSTOLIC BLOOD PRESSURE: 144 MMHG

## 2024-01-10 DIAGNOSIS — K31.A0 GASTRIC INTESTINAL METAPLASIA, UNSPECIFIED: ICD-10-CM

## 2024-01-10 DIAGNOSIS — A04.8 OTHER SPECIFIED BACTERIAL INTESTINAL INFECTIONS: ICD-10-CM

## 2024-01-10 DIAGNOSIS — R74.8 ABNORMAL LEVELS OF OTHER SERUM ENZYMES: ICD-10-CM

## 2024-01-10 PROCEDURE — L9991: CPT

## 2024-01-10 NOTE — ED ADULT NURSE NOTE - ED_CALLED_NO_RESPONSE_NOTE 3
called pt on contact #, he stated " came for regular blood work from GI doc, couldn't  wait anymore"

## 2024-01-10 NOTE — ED ADULT NURSE NOTE - NSFALLUNIVINTERV_ED_ALL_ED
Bed/Stretcher in lowest position, wheels locked, appropriate side rails in place/Call bell, personal items and telephone in reach/Instruct patient to call for assistance before getting out of bed/chair/stretcher/Non-slip footwear applied when patient is off stretcher/[place] to call system/Physically safe environment - no spills, clutter or unnecessary equipment/Purposeful proactive rounding/Room/bathroom lighting operational, light cord in reach Bed/Stretcher in lowest position, wheels locked, appropriate side rails in place/Call bell, personal items and telephone in reach/Instruct patient to call for assistance before getting out of bed/chair/stretcher/Non-slip footwear applied when patient is off stretcher/[place] to call system/Physically safe environment - no spills, clutter or unnecessary equipment/Purposeful proactive rounding/Room/bathroom lighting operational, light cord in reach

## 2024-01-17 ENCOUNTER — OUTPATIENT (OUTPATIENT)
Dept: OUTPATIENT SERVICES | Facility: HOSPITAL | Age: 49
LOS: 1 days | End: 2024-01-17
Payer: SELF-PAY

## 2024-01-17 ENCOUNTER — APPOINTMENT (OUTPATIENT)
Dept: ORTHOPEDIC SURGERY | Facility: HOSPITAL | Age: 49
End: 2024-01-17

## 2024-01-17 VITALS
HEIGHT: 65 IN | WEIGHT: 163 LBS | RESPIRATION RATE: 14 BRPM | SYSTOLIC BLOOD PRESSURE: 123 MMHG | HEART RATE: 73 BPM | BODY MASS INDEX: 27.16 KG/M2 | TEMPERATURE: 97.3 F | DIASTOLIC BLOOD PRESSURE: 80 MMHG

## 2024-01-17 DIAGNOSIS — A04.8 OTHER SPECIFIED BACTERIAL INTESTINAL INFECTIONS: ICD-10-CM

## 2024-01-17 DIAGNOSIS — R74.8 ABNORMAL LEVELS OF OTHER SERUM ENZYMES: ICD-10-CM

## 2024-01-17 DIAGNOSIS — M79.9 SOFT TISSUE DISORDER, UNSPECIFIED: ICD-10-CM

## 2024-01-17 DIAGNOSIS — K31.A0 GASTRIC INTESTINAL METAPLASIA, UNSPECIFIED: ICD-10-CM

## 2024-01-17 LAB
ALBUMIN SERPL ELPH-MCNC: 4.2 G/DL — SIGNIFICANT CHANGE UP (ref 3.3–5)
ALP SERPL-CCNC: 138 U/L — HIGH (ref 40–120)
ALT FLD-CCNC: 45 U/L — SIGNIFICANT CHANGE UP (ref 10–45)
ANION GAP SERPL CALC-SCNC: 9 MMOL/L — SIGNIFICANT CHANGE UP (ref 5–17)
AST SERPL-CCNC: 25 U/L — SIGNIFICANT CHANGE UP (ref 10–40)
BILIRUB SERPL-MCNC: 0.3 MG/DL — SIGNIFICANT CHANGE UP (ref 0.2–1.2)
BUN SERPL-MCNC: 17 MG/DL — SIGNIFICANT CHANGE UP (ref 7–23)
CALCIUM SERPL-MCNC: 9.2 MG/DL — SIGNIFICANT CHANGE UP (ref 8.4–10.5)
CHLORIDE SERPL-SCNC: 103 MMOL/L — SIGNIFICANT CHANGE UP (ref 96–108)
CO2 SERPL-SCNC: 26 MMOL/L — SIGNIFICANT CHANGE UP (ref 22–31)
CREAT SERPL-MCNC: 0.58 MG/DL — SIGNIFICANT CHANGE UP (ref 0.5–1.3)
EGFR: 119 ML/MIN/1.73M2 — SIGNIFICANT CHANGE UP
FERRITIN SERPL-MCNC: 70 NG/ML — SIGNIFICANT CHANGE UP (ref 30–400)
GLUCOSE SERPL-MCNC: 101 MG/DL — HIGH (ref 70–99)
HAV IGG SER QL IA: REACTIVE
HBV CORE AB SER-ACNC: SIGNIFICANT CHANGE UP
HBV SURFACE AB SER-ACNC: SIGNIFICANT CHANGE UP
HBV SURFACE AG SER-ACNC: SIGNIFICANT CHANGE UP
HCV AB S/CO SERPL IA: 0.13 S/CO — SIGNIFICANT CHANGE UP (ref 0–0.99)
HCV AB SERPL-IMP: SIGNIFICANT CHANGE UP
IGA FLD-MCNC: 391 MG/DL — SIGNIFICANT CHANGE UP (ref 84–499)
IGG FLD-MCNC: 1261 MG/DL — SIGNIFICANT CHANGE UP (ref 610–1660)
IRON SATN MFR SERPL: 17 % — SIGNIFICANT CHANGE UP (ref 16–55)
IRON SATN MFR SERPL: 58 UG/DL — SIGNIFICANT CHANGE UP (ref 45–165)
POTASSIUM SERPL-MCNC: 4.1 MMOL/L — SIGNIFICANT CHANGE UP (ref 3.5–5.3)
POTASSIUM SERPL-SCNC: 4.1 MMOL/L — SIGNIFICANT CHANGE UP (ref 3.5–5.3)
PROT SERPL-MCNC: 6.8 G/DL — SIGNIFICANT CHANGE UP (ref 6–8.3)
SODIUM SERPL-SCNC: 139 MMOL/L — SIGNIFICANT CHANGE UP (ref 135–145)
TIBC SERPL-MCNC: 352 UG/DL — SIGNIFICANT CHANGE UP (ref 220–430)
TRANSFERRIN SERPL-MCNC: 247 MG/DL — SIGNIFICANT CHANGE UP (ref 200–360)
UIBC SERPL-MCNC: 294 UG/DL — SIGNIFICANT CHANGE UP (ref 110–370)

## 2024-01-17 PROCEDURE — 86038 ANTINUCLEAR ANTIBODIES: CPT

## 2024-01-17 PROCEDURE — 87338 HPYLORI STOOL AG IA: CPT

## 2024-01-17 PROCEDURE — G0463: CPT

## 2024-01-17 PROCEDURE — 36415 COLL VENOUS BLD VENIPUNCTURE: CPT

## 2024-01-17 PROCEDURE — 80053 COMPREHEN METABOLIC PANEL: CPT

## 2024-01-17 PROCEDURE — 86708 HEPATITIS A ANTIBODY: CPT

## 2024-01-17 PROCEDURE — 86381 MITOCHONDRIAL ANTIBODY EACH: CPT

## 2024-01-17 PROCEDURE — 82728 ASSAY OF FERRITIN: CPT

## 2024-01-17 PROCEDURE — 86803 HEPATITIS C AB TEST: CPT

## 2024-01-17 PROCEDURE — 83550 IRON BINDING TEST: CPT

## 2024-01-17 PROCEDURE — 86704 HEP B CORE ANTIBODY TOTAL: CPT

## 2024-01-17 PROCEDURE — 87340 HEPATITIS B SURFACE AG IA: CPT

## 2024-01-17 PROCEDURE — 86364 TISS TRNSGLTMNASE EA IG CLAS: CPT

## 2024-01-17 PROCEDURE — 86706 HEP B SURFACE ANTIBODY: CPT

## 2024-01-17 PROCEDURE — 86255 FLUORESCENT ANTIBODY SCREEN: CPT

## 2024-01-17 PROCEDURE — 83540 ASSAY OF IRON: CPT

## 2024-01-17 PROCEDURE — 82784 ASSAY IGA/IGD/IGG/IGM EACH: CPT

## 2024-01-17 PROCEDURE — 84466 ASSAY OF TRANSFERRIN: CPT

## 2024-01-17 PROCEDURE — 87324 CLOSTRIDIUM AG IA: CPT

## 2024-01-18 DIAGNOSIS — M54.50 LOW BACK PAIN, UNSPECIFIED: ICD-10-CM

## 2024-01-19 ENCOUNTER — RESULT REVIEW (OUTPATIENT)
Age: 49
End: 2024-01-19

## 2024-01-19 ENCOUNTER — APPOINTMENT (OUTPATIENT)
Dept: MRI IMAGING | Facility: CLINIC | Age: 49
End: 2024-01-19
Payer: COMMERCIAL

## 2024-01-19 LAB
ANA TITR SER: NEGATIVE — SIGNIFICANT CHANGE UP
H PYLORI AG STL QL: NEGATIVE — SIGNIFICANT CHANGE UP
MITOCHONDRIA AB SER-ACNC: SIGNIFICANT CHANGE UP
SMOOTH MUSCLE AB SER-ACNC: ABNORMAL
TTG IGA SER-ACNC: 1.4 U/ML — SIGNIFICANT CHANGE UP
TTG IGA SER-ACNC: NEGATIVE — SIGNIFICANT CHANGE UP

## 2024-01-19 PROCEDURE — 72148 MRI LUMBAR SPINE W/O DYE: CPT

## 2024-02-01 ENCOUNTER — OUTPATIENT (OUTPATIENT)
Dept: OUTPATIENT SERVICES | Facility: HOSPITAL | Age: 49
LOS: 1 days | End: 2024-02-01
Payer: SELF-PAY

## 2024-02-01 ENCOUNTER — RESULT REVIEW (OUTPATIENT)
Age: 49
End: 2024-02-01

## 2024-02-01 ENCOUNTER — TRANSCRIPTION ENCOUNTER (OUTPATIENT)
Age: 49
End: 2024-02-01

## 2024-02-01 VITALS
HEIGHT: 65 IN | TEMPERATURE: 98 F | RESPIRATION RATE: 20 BRPM | OXYGEN SATURATION: 98 % | DIASTOLIC BLOOD PRESSURE: 69 MMHG | WEIGHT: 167.99 LBS | SYSTOLIC BLOOD PRESSURE: 108 MMHG | HEART RATE: 64 BPM

## 2024-02-01 VITALS
SYSTOLIC BLOOD PRESSURE: 110 MMHG | HEART RATE: 65 BPM | RESPIRATION RATE: 17 BRPM | OXYGEN SATURATION: 100 % | DIASTOLIC BLOOD PRESSURE: 76 MMHG

## 2024-02-01 DIAGNOSIS — K31.A0 GASTRIC INTESTINAL METAPLASIA, UNSPECIFIED: ICD-10-CM

## 2024-02-01 DIAGNOSIS — Z98.890 OTHER SPECIFIED POSTPROCEDURAL STATES: Chronic | ICD-10-CM

## 2024-02-01 PROCEDURE — 88341 IMHCHEM/IMCYTCHM EA ADD ANTB: CPT | Mod: 26

## 2024-02-01 PROCEDURE — 88305 TISSUE EXAM BY PATHOLOGIST: CPT | Mod: 26

## 2024-02-01 PROCEDURE — 43239 EGD BIOPSY SINGLE/MULTIPLE: CPT

## 2024-02-01 PROCEDURE — 88341 IMHCHEM/IMCYTCHM EA ADD ANTB: CPT

## 2024-02-01 PROCEDURE — 43239 EGD BIOPSY SINGLE/MULTIPLE: CPT | Mod: GC

## 2024-02-01 PROCEDURE — 88305 TISSUE EXAM BY PATHOLOGIST: CPT

## 2024-02-01 PROCEDURE — 88342 IMHCHEM/IMCYTCHM 1ST ANTB: CPT | Mod: 26

## 2024-02-01 RX ORDER — SODIUM CHLORIDE 9 MG/ML
500 INJECTION INTRAMUSCULAR; INTRAVENOUS; SUBCUTANEOUS
Refills: 0 | Status: DISCONTINUED | OUTPATIENT
Start: 2024-02-01 | End: 2024-02-16

## 2024-02-01 RX ORDER — ACETAMINOPHEN 500 MG
2 TABLET ORAL
Qty: 0 | Refills: 0 | DISCHARGE

## 2024-02-01 RX ORDER — LIDOCAINE HCL 20 MG/ML
4 VIAL (ML) INJECTION ONCE
Refills: 0 | Status: DISCONTINUED | OUTPATIENT
Start: 2024-02-01 | End: 2024-02-16

## 2024-02-01 NOTE — ASU DISCHARGE PLAN (ADULT/PEDIATRIC) - NS MD DC FALL RISK RISK
For information on Fall & Injury Prevention, visit: [email or web address] OR  [email or web address] OR  https://www.cdc.gov/steadi/patient.html

## 2024-02-01 NOTE — PACU DISCHARGE NOTE - MENTAL STATUS: PATIENT PARTICIPATION
Awake [place] Counseling: I discussed with the patient the risks of risankizumab-rzaa including but not limited to immunosuppression, and serious infections.  The patient understands that monitoring is required including a PPD at baseline and must alert us or the primary physician if symptoms of infection or other concerning signs are noted.

## 2024-02-01 NOTE — PRE PROCEDURE NOTE - PRE PROCEDURE EVALUATION
Attending Physician: Dr. Rose                           Procedure: EGD    Indication for Procedure: IM mapping  ________________________________________________________  PAST MEDICAL & SURGICAL HISTORY:  BPH (benign prostatic hyperplasia)      Kidney stones      No significant past surgical history        ALLERGIES:  No Known Allergies    HOME MEDICATIONS:  Tylenol 500 mg oral tablet: 2 tab(s) orally every 6 hours, As Needed    AICD/PPM: [ ] yes   [X] no    PERTINENT LAB DATA:                      PHYSICAL EXAMINATION:    T(C): --  HR: --  BP: --  RR: --  SpO2: --  See physical exam under H&P/Progress note      COMMENTS:  Discussed with patient/HCP risks of endoscopy/colonoscopy which include but are not limited to: risks of perforation, infection and bleeding as well as missed lesions. Patient/HCP would like to proceed at this time.     The patient is a suitable candidate for the planned procedure unless box checked [ ]  No, explain:

## 2024-02-01 NOTE — ASU PATIENT PROFILE, ADULT - FALL HARM RISK - UNIVERSAL INTERVENTIONS
Bed in lowest position, wheels locked, appropriate side rails in place/Call bell, personal items and telephone in reach/Instruct patient to call for assistance before getting out of bed or chair/Non-slip footwear when patient is out of bed/[place] to call system/Physically safe environment - no spills, clutter or unnecessary equipment/Purposeful Proactive Rounding/Room/bathroom lighting operational, light cord in reach

## 2024-02-05 LAB — SURGICAL PATHOLOGY STUDY: SIGNIFICANT CHANGE UP

## 2024-02-07 ENCOUNTER — OUTPATIENT (OUTPATIENT)
Dept: OUTPATIENT SERVICES | Facility: HOSPITAL | Age: 49
LOS: 1 days | End: 2024-02-07
Payer: SELF-PAY

## 2024-02-07 ENCOUNTER — APPOINTMENT (OUTPATIENT)
Dept: ORTHOPEDIC SURGERY | Facility: HOSPITAL | Age: 49
End: 2024-02-07

## 2024-02-07 ENCOUNTER — NON-APPOINTMENT (OUTPATIENT)
Age: 49
End: 2024-02-07

## 2024-02-07 DIAGNOSIS — M54.50 LOW BACK PAIN, UNSPECIFIED: ICD-10-CM

## 2024-02-07 DIAGNOSIS — M79.9 SOFT TISSUE DISORDER, UNSPECIFIED: ICD-10-CM

## 2024-02-07 PROCEDURE — G0463: CPT

## 2024-02-28 ENCOUNTER — INPATIENT (INPATIENT)
Facility: HOSPITAL | Age: 49
LOS: 1 days | Discharge: HOME | DRG: 392 | End: 2024-03-01
Attending: STUDENT IN AN ORGANIZED HEALTH CARE EDUCATION/TRAINING PROGRAM | Admitting: STUDENT IN AN ORGANIZED HEALTH CARE EDUCATION/TRAINING PROGRAM
Payer: MEDICAID

## 2024-02-28 VITALS
RESPIRATION RATE: 18 BRPM | SYSTOLIC BLOOD PRESSURE: 107 MMHG | HEART RATE: 76 BPM | HEIGHT: 65 IN | WEIGHT: 158.73 LBS | TEMPERATURE: 99 F | OXYGEN SATURATION: 97 % | DIASTOLIC BLOOD PRESSURE: 76 MMHG

## 2024-02-28 DIAGNOSIS — K52.9 NONINFECTIVE GASTROENTERITIS AND COLITIS, UNSPECIFIED: ICD-10-CM

## 2024-02-28 DIAGNOSIS — Z29.9 ENCOUNTER FOR PROPHYLACTIC MEASURES, UNSPECIFIED: ICD-10-CM

## 2024-02-28 DIAGNOSIS — Z98.890 OTHER SPECIFIED POSTPROCEDURAL STATES: Chronic | ICD-10-CM

## 2024-02-28 LAB
ALBUMIN SERPL ELPH-MCNC: 3.1 G/DL — LOW (ref 3.5–5)
ALP SERPL-CCNC: 97 U/L — SIGNIFICANT CHANGE UP (ref 40–120)
ALT FLD-CCNC: 46 U/L DA — SIGNIFICANT CHANGE UP (ref 10–60)
ANION GAP SERPL CALC-SCNC: 5 MMOL/L — SIGNIFICANT CHANGE UP (ref 5–17)
APPEARANCE UR: CLEAR — SIGNIFICANT CHANGE UP
AST SERPL-CCNC: 27 U/L — SIGNIFICANT CHANGE UP (ref 10–40)
BACTERIA # UR AUTO: ABNORMAL /HPF
BASOPHILS # BLD AUTO: 0 K/UL — SIGNIFICANT CHANGE UP (ref 0–0.2)
BASOPHILS NFR BLD AUTO: 0 % — SIGNIFICANT CHANGE UP (ref 0–2)
BILIRUB SERPL-MCNC: 0.4 MG/DL — SIGNIFICANT CHANGE UP (ref 0.2–1.2)
BILIRUB UR-MCNC: NEGATIVE — SIGNIFICANT CHANGE UP
BUN SERPL-MCNC: 17 MG/DL — SIGNIFICANT CHANGE UP (ref 7–18)
C DIFF BY PCR RESULT: SIGNIFICANT CHANGE UP
CALCIUM SERPL-MCNC: 8.3 MG/DL — LOW (ref 8.4–10.5)
CHLORIDE SERPL-SCNC: 108 MMOL/L — SIGNIFICANT CHANGE UP (ref 96–108)
CO2 SERPL-SCNC: 25 MMOL/L — SIGNIFICANT CHANGE UP (ref 22–31)
COLOR SPEC: YELLOW — SIGNIFICANT CHANGE UP
CREAT SERPL-MCNC: 0.76 MG/DL — SIGNIFICANT CHANGE UP (ref 0.5–1.3)
DIFF PNL FLD: NEGATIVE — SIGNIFICANT CHANGE UP
EGFR: 110 ML/MIN/1.73M2 — SIGNIFICANT CHANGE UP
EOSINOPHIL # BLD AUTO: 0.05 K/UL — SIGNIFICANT CHANGE UP (ref 0–0.5)
EOSINOPHIL NFR BLD AUTO: 1 % — SIGNIFICANT CHANGE UP (ref 0–6)
EPI CELLS # UR: PRESENT
GLUCOSE SERPL-MCNC: 121 MG/DL — HIGH (ref 70–99)
GLUCOSE UR QL: NEGATIVE MG/DL — SIGNIFICANT CHANGE UP
HCT VFR BLD CALC: 40.6 % — SIGNIFICANT CHANGE UP (ref 39–50)
HGB BLD-MCNC: 13.5 G/DL — SIGNIFICANT CHANGE UP (ref 13–17)
KETONES UR-MCNC: NEGATIVE MG/DL — SIGNIFICANT CHANGE UP
LEUKOCYTE ESTERASE UR-ACNC: ABNORMAL
LIDOCAIN IGE QN: 32 U/L — SIGNIFICANT CHANGE UP (ref 13–75)
LYMPHOCYTES # BLD AUTO: 1.04 K/UL — SIGNIFICANT CHANGE UP (ref 1–3.3)
LYMPHOCYTES # BLD AUTO: 21 % — SIGNIFICANT CHANGE UP (ref 13–44)
MAGNESIUM SERPL-MCNC: 2.3 MG/DL — SIGNIFICANT CHANGE UP (ref 1.6–2.6)
MANUAL SMEAR VERIFICATION: SIGNIFICANT CHANGE UP
MCHC RBC-ENTMCNC: 28.5 PG — SIGNIFICANT CHANGE UP (ref 27–34)
MCHC RBC-ENTMCNC: 33.3 GM/DL — SIGNIFICANT CHANGE UP (ref 32–36)
MCV RBC AUTO: 85.7 FL — SIGNIFICANT CHANGE UP (ref 80–100)
MONOCYTES # BLD AUTO: 0.44 K/UL — SIGNIFICANT CHANGE UP (ref 0–0.9)
MONOCYTES NFR BLD AUTO: 9 % — SIGNIFICANT CHANGE UP (ref 2–14)
NEUTROPHILS # BLD AUTO: 3.41 K/UL — SIGNIFICANT CHANGE UP (ref 1.8–7.4)
NEUTROPHILS NFR BLD AUTO: 66 % — SIGNIFICANT CHANGE UP (ref 43–77)
NEUTS BAND # BLD: 3 % — SIGNIFICANT CHANGE UP (ref 0–8)
NITRITE UR-MCNC: NEGATIVE — SIGNIFICANT CHANGE UP
NRBC # BLD: 0 /100 WBCS — SIGNIFICANT CHANGE UP (ref 0–0)
PH UR: 6 — SIGNIFICANT CHANGE UP (ref 5–8)
PHOSPHATE SERPL-MCNC: 3.9 MG/DL — SIGNIFICANT CHANGE UP (ref 2.5–4.5)
PLAT MORPH BLD: NORMAL — SIGNIFICANT CHANGE UP
PLATELET # BLD AUTO: 286 K/UL — SIGNIFICANT CHANGE UP (ref 150–400)
PLATELET COUNT - ESTIMATE: NORMAL — SIGNIFICANT CHANGE UP
POTASSIUM SERPL-MCNC: 4.2 MMOL/L — SIGNIFICANT CHANGE UP (ref 3.5–5.3)
POTASSIUM SERPL-SCNC: 4.2 MMOL/L — SIGNIFICANT CHANGE UP (ref 3.5–5.3)
PROT SERPL-MCNC: 6.8 G/DL — SIGNIFICANT CHANGE UP (ref 6–8.3)
PROT UR-MCNC: NEGATIVE MG/DL — SIGNIFICANT CHANGE UP
RBC # BLD: 4.74 M/UL — SIGNIFICANT CHANGE UP (ref 4.2–5.8)
RBC # FLD: 13.1 % — SIGNIFICANT CHANGE UP (ref 10.3–14.5)
RBC BLD AUTO: NORMAL — SIGNIFICANT CHANGE UP
RBC CASTS # UR COMP ASSIST: 0 /HPF — SIGNIFICANT CHANGE UP (ref 0–4)
SODIUM SERPL-SCNC: 138 MMOL/L — SIGNIFICANT CHANGE UP (ref 135–145)
SP GR SPEC: 1.02 — SIGNIFICANT CHANGE UP (ref 1–1.03)
UROBILINOGEN FLD QL: 0.2 MG/DL — SIGNIFICANT CHANGE UP (ref 0.2–1)
WBC # BLD: 4.94 K/UL — SIGNIFICANT CHANGE UP (ref 3.8–10.5)
WBC # FLD AUTO: 4.94 K/UL — SIGNIFICANT CHANGE UP (ref 3.8–10.5)
WBC UR QL: 3 /HPF — SIGNIFICANT CHANGE UP (ref 0–5)

## 2024-02-28 PROCEDURE — 74177 CT ABD & PELVIS W/CONTRAST: CPT | Mod: 26,MC

## 2024-02-28 PROCEDURE — 99223 1ST HOSP IP/OBS HIGH 75: CPT | Mod: GC

## 2024-02-28 PROCEDURE — 99285 EMERGENCY DEPT VISIT HI MDM: CPT

## 2024-02-28 PROCEDURE — 99053 MED SERV 10PM-8AM 24 HR FAC: CPT

## 2024-02-28 RX ORDER — LANOLIN ALCOHOL/MO/W.PET/CERES
3 CREAM (GRAM) TOPICAL AT BEDTIME
Refills: 0 | Status: DISCONTINUED | OUTPATIENT
Start: 2024-02-28 | End: 2024-03-01

## 2024-02-28 RX ORDER — CEFTRIAXONE 500 MG/1
1000 INJECTION, POWDER, FOR SOLUTION INTRAMUSCULAR; INTRAVENOUS EVERY 24 HOURS
Refills: 0 | Status: DISCONTINUED | OUTPATIENT
Start: 2024-02-29 | End: 2024-03-01

## 2024-02-28 RX ORDER — SODIUM CHLORIDE 9 MG/ML
1000 INJECTION, SOLUTION INTRAVENOUS
Refills: 0 | Status: DISCONTINUED | OUTPATIENT
Start: 2024-02-28 | End: 2024-03-01

## 2024-02-28 RX ORDER — ACETAMINOPHEN 500 MG
650 TABLET ORAL EVERY 6 HOURS
Refills: 0 | Status: DISCONTINUED | OUTPATIENT
Start: 2024-02-28 | End: 2024-03-01

## 2024-02-28 RX ORDER — METRONIDAZOLE 500 MG
500 TABLET ORAL ONCE
Refills: 0 | Status: COMPLETED | OUTPATIENT
Start: 2024-02-28 | End: 2024-02-28

## 2024-02-28 RX ORDER — ENOXAPARIN SODIUM 100 MG/ML
40 INJECTION SUBCUTANEOUS EVERY 24 HOURS
Refills: 0 | Status: DISCONTINUED | OUTPATIENT
Start: 2024-02-28 | End: 2024-03-01

## 2024-02-28 RX ORDER — METRONIDAZOLE 500 MG
500 TABLET ORAL EVERY 8 HOURS
Refills: 0 | Status: DISCONTINUED | OUTPATIENT
Start: 2024-02-28 | End: 2024-03-01

## 2024-02-28 RX ORDER — CEFTRIAXONE 500 MG/1
1000 INJECTION, POWDER, FOR SOLUTION INTRAMUSCULAR; INTRAVENOUS ONCE
Refills: 0 | Status: COMPLETED | OUTPATIENT
Start: 2024-02-28 | End: 2024-02-28

## 2024-02-28 RX ORDER — ACETAMINOPHEN 500 MG
2 TABLET ORAL
Refills: 0 | DISCHARGE

## 2024-02-28 RX ORDER — SODIUM CHLORIDE 9 MG/ML
1000 INJECTION INTRAMUSCULAR; INTRAVENOUS; SUBCUTANEOUS ONCE
Refills: 0 | Status: COMPLETED | OUTPATIENT
Start: 2024-02-28 | End: 2024-02-28

## 2024-02-28 RX ORDER — METRONIDAZOLE 500 MG
TABLET ORAL
Refills: 0 | Status: DISCONTINUED | OUTPATIENT
Start: 2024-02-28 | End: 2024-03-01

## 2024-02-28 RX ORDER — CEFTRIAXONE 500 MG/1
INJECTION, POWDER, FOR SOLUTION INTRAMUSCULAR; INTRAVENOUS
Refills: 0 | Status: DISCONTINUED | OUTPATIENT
Start: 2024-02-28 | End: 2024-03-01

## 2024-02-28 RX ADMIN — ENOXAPARIN SODIUM 40 MILLIGRAM(S): 100 INJECTION SUBCUTANEOUS at 18:49

## 2024-02-28 RX ADMIN — SODIUM CHLORIDE 1000 MILLILITER(S): 9 INJECTION INTRAMUSCULAR; INTRAVENOUS; SUBCUTANEOUS at 04:23

## 2024-02-28 RX ADMIN — SODIUM CHLORIDE 100 MILLILITER(S): 9 INJECTION, SOLUTION INTRAVENOUS at 18:49

## 2024-02-28 RX ADMIN — Medication 100 MILLIGRAM(S): at 13:07

## 2024-02-28 RX ADMIN — CEFTRIAXONE 1000 MILLIGRAM(S): 500 INJECTION, POWDER, FOR SOLUTION INTRAMUSCULAR; INTRAVENOUS at 13:08

## 2024-02-28 RX ADMIN — Medication 100 MILLIGRAM(S): at 22:15

## 2024-02-28 NOTE — ED ADULT NURSE NOTE - OBJECTIVE STATEMENT
pt came to the ED for c/o abdominal pain and diarrhea, but denies any nausea or vomiting since Sunday

## 2024-02-28 NOTE — PATIENT PROFILE ADULT - FALL HARM RISK - UNIVERSAL INTERVENTIONS
Bed in lowest position, wheels locked, appropriate side rails in place/Call bell, personal items and telephone in reach/Instruct patient to call for assistance before getting out of bed or chair/Non-slip footwear when patient is out of bed/[place] to call system/Physically safe environment - no spills, clutter or unnecessary equipment/Purposeful Proactive Rounding/Room/bathroom lighting operational, light cord in reach

## 2024-02-28 NOTE — H&P ADULT - ASSESSMENT
48 y.o. M with PMHx  of HLD, is not taking any meds daily presents to the ED with abd pain and multiple loose stools. Admitted to medicine for colitis.

## 2024-02-28 NOTE — CONSULT NOTE ADULT - ASSESSMENT
49y/o male with no sign. PMHx presents to the ED with Abd pain and multiple loose stools x 4days. Pain is mostly periumbilical /mid lower abdominal , RLQ pain, described as crampy and persistent with no nausea , vomiting fever or chills. Afebrile , No McBurney's tenderness, CT shows a dilated 1.2cm appendix No periappendiceal changes . Palumbo scores appears to be 0. Acute appendicitis highly unlikely     PO Challenge and Hydration  Pain control   f/up AM labs   Other care/mgm per primary team   Will follow/observe 49y/o male with no sign. PMHx presents to the ED with Abd pain and multiple loose stools x 4days. Pain is mostly periumbilical /mid lower abdominal , RLQ pain, described as crampy and persistent with no nausea , vomiting fever or chills. Afebrile , No McBurney's tenderness, CT shows a dilated 1.2cm appendix No periappendiceal changes . Palumbo scores appears to be 0. Dilated appendix could be reactive to collitis Acute appendicitis highly unlikely .     PO Challenge and Hydration  Pain control   f/up AM labs   Other care/mgm per primary team   Will follow/observe

## 2024-02-28 NOTE — ED ADULT NURSE NOTE - SUICIDE SCREENING QUESTION 2
Pt admitted from PACU s/p total thyroidectomy . VSS. Incisions: anterior neck with steristrips. A/O x4. Voids to toilet. Fall precautions in place. Will continue to monitor.      Problem: Pain  Goal: #Acceptable pain level achieved/maintained at rest using NRS/Faces  Description: This goal is used for patients who can self-report.  Acceptable means the level is at or below the identified comfort/function goal.  Outcome: Outcome Not Met, Continue to Monitor  Goal: # Acceptable pain level achieved/maintained at rest using NRS/Faces without oversedation (opioid naive or PCA/Epidural infusion)  Description: This goal is used if Opioid-naïve or on PCA/Epidural Infusion.  Outcome: Outcome Not Met, Continue to Monitor  Goal: # Acceptable pain level achieved/maintained with activity using NRS/Faces  Description: This goal is used for patients who can self-report and are not achieving acceptable pain control during activity.  Outcome: Outcome Not Met, Continue to Monitor  Goal: # Verbalizes understanding of pain management  Description: Documented in Patient Education Activity  Outcome: Outcome Not Met, Continue to Monitor      Medical Necessity Clause: This procedure was medically necessary because the lesions that were treated were: Consent: The patient's consent was obtained including but not limited to risks of crusting, scabbing, blistering, scarring, darker or lighter pigmentary change, recurrence, incomplete removal and infection. Duration Of Freeze Thaw-Cycle (Seconds): 15-20 Render Note In Bullet Format When Appropriate: No Medical Necessity Information: It is in your best interest to select a reason for this procedure from the list below. All of these items fulfill various CMS LCD requirements except the new and changing color options. Show Aperture Variable?: Yes Detail Level: Simple Post-Care Instructions: I reviewed with the patient in detail post-care instructions. Patient is to wear sunprotection, and avoid picking at any of the treated lesions. Pt may apply Vaseline to crusted or scabbing areas. Number Of Freeze-Thaw Cycles: 2 freeze-thaw cycles No

## 2024-02-28 NOTE — H&P ADULT - HISTORY OF PRESENT ILLNESS
48 y.o. M with PMHx  of HLD, is not taking any meds daily presents to the ED with abd pain and multiple loose stools. Pt states that for the past 4days he has been having loose stools, multiple times per day, denies any blood in the stool. Also has been having pain which is mostly periumbilical /mid lower abdominal, RLQ pain, described as crampy and persistent with no nausea, vomiting fever or chills. Denies any recent travel, sick contacts, eating anything new, antibiotic use. Otherwise pt denies any chest pain, palpitations, shortness of breath, fever, chills, headache, visual changes, nausea, vomiting, dysuria, frequency. NKDA.    In the ED   VSS afebrile   CT a/p- Colitis of the descending and rectosigmoid colon.  Distended appendix without discrete periappendiceal inflammation.

## 2024-02-28 NOTE — ED PROVIDER NOTE - CLINICAL SUMMARY MEDICAL DECISION MAKING FREE TEXT BOX
48-year-old male no past medical history presenting with 3 days of bilateral lower abdominal pain associated with diarrhea.  Patient states that the pain is localized bilaterally across the lower abdomen, and has been having around 15 episodes of nonbloody diarrhea per day.    Denies any recent travel, no recent hospitalizations,  and no recent antibiotic use. No nausea or vomiting.   no fever no chills. VSS.   Patient well-appearing, mucosal membranes dry.  Abdomen is soft, nondistended, mild tenderness to palpation suprapubically.  Will send urinalysis to evaluate for possible UTI. Ct abd/pelvis to eval colitis vs diverticulitis.

## 2024-02-28 NOTE — CONSULT NOTE ADULT - NS ATTEND AMEND GEN_ALL_CORE FT
Very low clinical suspicion for acute appendicitis - there is intermittent lower abdominal pain, no tenderness in RLQ.   Plan to contiunue treatment and work up for colitis  Recall surgery PRN

## 2024-02-28 NOTE — H&P ADULT - ATTENDING COMMENTS
Patient was seen and examined at bedside, discussed plan with resident, Full note to follow Patient was seen and examined at bedside, Reports abd pain has resolved but still having diarrhea, had 11 BMs since 12 am last night. Denies any fever, N.V. Tolerating diet.     ICU Vital Signs Last 24 Hrs  T(C): 36.6 (28 Feb 2024 11:22), Max: 37.1 (28 Feb 2024 01:13)  T(F): 97.9 (28 Feb 2024 11:22), Max: 98.7 (28 Feb 2024 01:13)  HR: 64 (28 Feb 2024 11:22) (64 - 78)  BP: 99/59 (28 Feb 2024 11:22) (99/59 - 109/70)  BP(mean): --  ABP: --  ABP(mean): --  RR: 18 (28 Feb 2024 11:22) (18 - 18)  SpO2: 98% (28 Feb 2024 11:22) (96% - 98%)    O2 Parameters below as of 28 Feb 2024 11:22  Patient On (Oxygen Delivery Method): room air      P/E:  NAD  AAOx3, no focal deficit   CTABL  S1S2 WNL  Abd soft, mild lower abd tenderness, BS present   BLLE no edema or calf tenderness     labs noted     A/P:  Colitis   HLD    Plan:   Start CLD   IVF  Send stool work up, C. diff, ova parasite, GI PCR, calprotectin   Start Ceftriaxone and Flagyl  Stool count   Cont statin , send lipid profile  Outpatient follow up with GI   Rest of the management as above

## 2024-02-28 NOTE — H&P ADULT - PROBLEM SELECTOR PLAN 2
IMPROVE VTE Individual Risk Assessment          RISK                                                          Points  [  ] Previous VTE                                                3  [  ] Thrombophilia                                             2  [  ] Lower limb paralysis                                   2        (unable to hold up >15 seconds)    [  ] Current Cancer                                             2         (within 6 months)  [ x ] Immobilization > 24 hrs                              1  [  ] ICU/CCU stay > 24 hours                             1  [  ] Age > 60                                                         1    IMPROVE VTE Score:         [   1      ]

## 2024-02-28 NOTE — H&P ADULT - NSHPPHYSICALEXAM_GEN_ALL_CORE
Hospitalist Progress Note  Harry Alvarez DO  Answering service: [phone] OR 36 from in house phone        Date of Service:  3/22/2023  NAME:  Claudean Guarneri  :  1948  MRN:  [id number]      Admission Summary/HPI:   The patient is a 67 y/o C F w/ PMH DM 2 who comes in w/ brief syncopal episode today after she got up from the toilet and was going to her bedroom. This syncope was unwitnessed but she did not have any post-ictal confusion. She has no chest pain, palpitations, , wheezing, dyspnea, weight gain or lower extremity edema. She has no fever or night sweats but did report of chills  On ROS she notes of non-productive cough and wheezing. The patient was recently treated for bronchitis. Interval history / Subjective: Fu pna, syncope. Mild hypoxia on 2 liters NC; 02 sat < 91% on RA. Episode of afib with RVR yesterday. Reports feeling worse and some  dyspnea today. Family at bedside. Assessment & Plan:        Sepsis 2/2 left lower lobe community-acquired pneumonia, mild/ Staph Bacteremia/ Left lower lobe community-acquired pneumonia  -worsened despite p.o.  Levaquin; continue ceftriaxone for 7 day course, s/p azithromycin, doxy has been added to provide further coverage   -blood cx 3/15 with staph bacteremia - suspect contamination; repeat blood cx NTD    --CT chest today showing collapse of the left upper lobe with dense consolidation and several internal air bronchograms  - RVP neg; obtain sputum if able   - discussed CT chest with pulm; mucinex, flutter valve   -pulm following     History of asthma  --does appear in exacerbation  --continue IV steroids dose; wean as able    --Duonebs clay  --Cont w/ Brovana and Pulmicort  --Cont singulair  --flutter valve   --pulm following     Atrial fib with RVR/ PVCs/ PACs  -brief and resolved on its own  -check TSH, echo as noted   -Cardizem increased -event monitor on DC   -cardio evaluated       Syncope  --Likely due to tachycardia in the setting of sepsis due to pneumonia  --Continuous telemetry  --CT head negative  --Echo w/ some diastolic dysfunction   --- d-dimer elevated;  LE dopplers and v/q scan neg   --No prodrome  --Orthostatics once improved from pneumonia perspective    DIONISIO or CKD  -not sure of baseline Cr; states her PCP has mentioned Cr fluctuates   -hold ARB for now;continue to hold HCTZ   -improving consider diuresis as able once stable Cr given eleavted probnp and some diastolic dysfunction   -monitor     Elevated LFTs   -unclear etiology  -medication induced?    - ABD US, hep panel neg   -hold home statin and zeita   -monitor     Hypertension, POA  --Currently slight hypertensive; likely due to steroids  --Cont w/ Diltiazem 240mg daily  --Cont w/ Clonidine 0.1mg bid  --hold HCTZ / hold diovan for now  --IV hydralazine prn     Hyperlipidemia, POA  --holding statin and zeita due to LFTS     Diabetes with hyperglycemia, POA  --Hold home meds  --SSI  --Long acting as needed  --diabetes nurse consult     I have independently reviewed and interpreted patient's lab and other diagnostic data. External notes were reviewed  Critical Care Time: Not applicable    Code status: Full  Prophylaxis: lovenox, SCD  Care Plan discussed with: Patient, RN, Family   Anticipated Disposition:      Hospital Problems  Never Reviewed            Codes Class Noted POA    Sepsis ([facility] [address] ICD-10-CM: A41.9  ICD-9-CM: 038.9, 995.91  3/16/2023 Unknown        Vasovagal syncope ICD-10-CM: R55  ICD-9-CM: 780.2  3/15/2023 Unknown       Review of Systems:   A comprehensive review of systems was negative except for that written above         Vital Signs:    Last 24hrs VS reviewed since prior progress note.  Most recent are:    Patient Vitals for the past 24 hrs:   Temp Pulse Resp BP SpO2   03/22/23 1318 98.2 °F (36.8 °C) 74 19 (!) 142/63 95 %   03/22/23 0831 98 °F (36.7 °C) 90 18 (!) 153/77 94 %   03/22/23 0716 -- -- -- -- 95 %   03/22/23 0700 -- 81 -- -- --   03/22/23 0404 98.7 °F (37.1 °C) 73 18 136/66 95 %   03/21/23 2329 97.6 °F (36.4 °C) 88 18 (!) 152/87 92 %   03/21/23 2023 97.8 °F (36.6 °C) 78 18 (!) 159/73 93 %   03/21/23 1921 -- -- -- -- 97 %   03/21/23 1741 98.5 °F (36.9 °C) 87 18 (!) 156/63 93 %            Intake/Output Summary (Last 24 hours) at 3/22/2023 1611  Last data filed at 3/22/2023 1320  Gross per 24 hour   Intake 1130 ml   Output 0 ml   Net 1130 ml            Physical Examination:     I had a face to face encounter with this patient and independently examined them on 3/22/2023 as outlined below:          General : alert x 3, awake, no acute distress,   HEENT: PEERL, EOMI, moist mucus membrane, TM clear  Neck: supple, no JVD, no meningeal signs  Chest: diminished b/l   CVS: S1 S2 heard, Capillary refill less than 2 seconds  Abd: soft/ non tender, non distended, BS physiological,   Ext: no clubbing, no cyanosis, no edema  Neuro/Psych: pleasant mood and affect, CN 2-12 grossly intact, sensory grossly within normal limit, diffuse weakness  Skin: warm            Data Review:    Review and/or order of clinical lab test  Review and/or order of tests in the radiology section of CPT  Review and/or order of tests in the medicine section of CPT    I have independently reviewed and interpreted patient's lab and all other diagnostic data    Notes reviewed from all clinical/nonclinical/nursing services involved in patient's clinical care. Care coordination discussions were held with appropriate clinical/nonclinical/ nursing providers based on care coordination needs. CT CHEST WO CONT    Result Date: 3/22/2023  1. Interval collapse of the left upper lobe with dense consolidation and several internal air bronchograms. Given the rapid progression from the chest x-ray just 2 days ago, this most likely represents mucous plugging and lobar collapse.  2. Small areas of patchy opacification in both lung bases with associated groundglass opacities. These more likely represent infection than atelectasis. US ABD COMP    Result Date: 3/21/2023  Status post cholecystectomy otherwise unremarkable. Labs:     Recent Labs     03/22/23 0214 03/21/23 0205   WBC 11.7* 12.3*   HGB 11.0* 11.7   HCT 32.5* 34.6*    272       Recent Labs     03/22/23 0214 03/21/23 0205 03/20/23 0203    138 136   K 3.5 4.1 3.8   * 109* 108   CO2 23 21 21   BUN 47* 46* 41*   CREA 1.18* 1.38* 1.25*   * 149* 157*   CA 9.2 9.8 9.3   MG 1.6  --   --        Recent Labs     03/22/23 0214 03/21/23 0205 03/20/23 0203   * 121* 98*   AP 60 69 70   TBILI 0.4 0.4 0.4   TP 5.6* 6.2* 5.9*   ALB 2.6* 2.7* 2.4*   GLOB 3.0 3.5 3.5       No results for input(s): INR, PTP, APTT, INREXT, INREXT in the last 72 hours. No results for input(s): FE, TIBC, PSAT, FERR in the last 72 hours. No results found for: FOL, RBCF   No results for input(s): PH, PCO2, PO2 in the last 72 hours. No results for input(s): CPK, CKNDX, TROIQ in the last 72 hours.     No lab exists for component: CPKMB  No results found for: CHOL, CHOLX, CHLST, CHOLV, HDL, HDLP, LDL, LDLC, DLDLP, TGLX, TRIGL, TRIGP, CHHD, CHHDX  Lab Results   Component Value Date/Time    Glucose (POC) 145 (H) 03/22/2023 04:07 PM    Glucose (POC) 129 (H) 03/22/2023 10:42 AM    Glucose (POC) 144 (H) 03/22/2023 08:34 AM    Glucose (POC) 136 (H) 03/21/2023 09:23 PM    Glucose (POC) 122 (H) 03/21/2023 04:48 PM     Lab Results   Component Value Date/Time    Color YELLOW/STRAW 07/19/2016 12:08 PM    Appearance CLEAR 07/19/2016 12:08 PM    Specific gravity 1.020 07/19/2016 12:08 PM    pH (UA) 5.5 07/19/2016 12:08 PM    Protein NEGATIVE 07/19/2016 12:08 PM    Glucose NEGATIVE 07/19/2016 12:08 PM    Ketone NEGATIVE 07/19/2016 12:08 PM    Bilirubin NEGATIVE 07/19/2016 12:08 PM    Urobilinogen 0.2 07/19/2016 12:08 PM    Nitrites NEGATIVE 07/19/2016 12:08 PM    Leukocyte Esterase NEGATIVE 07/19/2016 12:08 PM    Epithelial cells FEW 07/19/2016 12:08 PM    Bacteria NEGATIVE 07/19/2016 12:08 PM    WBC 0-4 07/19/2016 12:08 PM    RBC 5-10 07/19/2016 12:08 PM         Medications Reviewed:     Current Facility-Administered Medications   Medication Dose Route Frequency    methylPREDNISolone (PF) (SOLU-MEDROL) injection 60 mg  60 mg IntraVENous Q8H    albuterol-ipratropium (DUO-NEB) 2.5 MG-0.5 MG/3 ML  3 mL Nebulization Q4H RT    [START ON 3/23/2023] dilTIAZem ER (CARDIZEM CD) capsule 300 mg  300 mg Oral DAILY    doxycycline (VIBRAMYCIN) 100 mg in 0.9% sodium chloride (MBP/ADV) 100 mL MBP  100 mg IntraVENous Q12H    cefTRIAXone (ROCEPHIN) 2 g in 0.9% sodium chloride 20 mL IV syringe  2 g IntraVENous ONCE    sodium chloride (OCEAN) 0.65 % nasal squeeze bottle 2 Spray  2 Spray Both Nostrils Q2H PRN    benzonatate (TESSALON) capsule 100 mg  100 mg Oral TID    hydrALAZINE (APRESOLINE) 20 mg/mL injection 10 mg  10 mg IntraVENous Q4H PRN    guaiFENesin ER (MUCINEX) tablet 1,200 mg  1,200 mg Oral Q12H    cloNIDine HCL (CATAPRES) tablet 0.1 mg  0.1 mg Oral BID    [Held by provider] atorvastatin (LIPITOR) tablet 40 mg  40 mg Oral QPM    [Held by provider] valsartan (DIOVAN) tablet 40 mg  40 mg Oral DAILY    [Held by provider] ezetimibe (ZETIA) tablet 10 mg  10 mg Oral QPM    arformoteroL (BROVANA) neb solution 15 mcg  15 mcg Nebulization BID RT    And    budesonide (PULMICORT) 500 mcg/2 ml nebulizer suspension  500 mcg Nebulization BID RT    [Held by provider] hydroCHLOROthiazide (HYDRODIURIL) tablet 25 mg  25 mg Oral DAILY    melatonin tablet 3 mg  3 mg Oral QHS PRN    montelukast (SINGULAIR) tablet 10 mg  10 mg Oral DAILY    pantoprazole (PROTONIX) tablet 40 mg  40 mg Oral ACB    insulin lispro (HUMALOG) injection   SubCUTAneous AC&HS    glucose chewable tablet 16 g  4 Tablet Oral PRN    glucagon (GLUCAGEN) injection 1 mg  1 mg IntraMUSCular PRN    dextrose 10% infusion 0-250 mL  0-250 mL IntraVENous PRN    sodium chloride (NS) flush 5-40 mL  5-40 mL IntraVENous Q8H    sodium chloride (NS) flush 5-40 mL  5-40 mL IntraVENous PRN    acetaminophen (TYLENOL) tablet 650 mg  650 mg Oral Q6H PRN    Or    acetaminophen (TYLENOL) suppository 650 mg  650 mg Rectal Q6H PRN    polyethylene glycol (MIRALAX) packet 17 g  17 g Oral DAILY PRN    ondansetron (ZOFRAN ODT) tablet 4 mg  4 mg Oral Q8H PRN    Or    ondansetron (ZOFRAN) injection 4 mg  4 mg IntraVENous Q6H PRN    enoxaparin (LOVENOX) injection 40 mg  40 mg SubCUTAneous DAILY     ______________________________________________________________________  EXPECTED LENGTH OF STAY: 5d 0h  ACTUAL LENGTH OF STAY:          Scott Bartlett DO VITALS:     GENERAL: NAD, lying in bed comfortably  HEAD:  Atraumatic, Normocephalic  EYES: EOMI, PERRLA, conjunctiva and sclera clear  ENT: Moist mucous membranes  NECK: Supple, No JVD  CHEST/LUNG: Clear to auscultation bilaterally; No rales, rhonchi, wheezing, or rubs. Unlabored respirations  HEART: Regular rate and rhythm; No murmurs, rubs, or gallops  ABDOMEN: +mild tenderness LQ b/l. Bowel sounds present; Soft, Nondistended. No hepatomegally  EXTREMITIES:  2+ Peripheral Pulses, brisk capillary refill. No clubbing, cyanosis, or edema  NERVOUS SYSTEM:  Alert & Oriented X3, speech clear. No deficits   MSK: FROM all 4 extremities, full and equal strength  SKIN: No rashes or lesions

## 2024-02-28 NOTE — ED PROVIDER NOTE - PHYSICAL EXAMINATION
Gen: NAD; well appearing  Resp: CTAB, no W/R/R  CV: RRR, +S1/S2, no M/R/G  GI: Abdomen soft non-distended, +lower abd ttp  Ext: no edema, no deformity, warm and well-perfused  Skin: no rash or bruising

## 2024-02-28 NOTE — H&P ADULT - NSHPREVIEWOFSYSTEMS_GEN_ALL_CORE
REVIEW OF SYSTEMS:    CONSTITUTIONAL: No weakness, fevers or chills  EYES/ENT: No visual changes;  No vertigo or throat pain   NECK: No pain or stiffness  RESPIRATORY: No cough, wheezing, hemoptysis; No shortness of breath  CARDIOVASCULAR: No chest pain or palpitations  GASTROINTESTINAL: + abdominal or epigastric pain. No nausea, vomiting, or hematemesis; + diarrhea. No melena or hematochezia.  GENITOURINARY: No dysuria, frequency or hematuria  NEUROLOGICAL: No numbness or weakness  SKIN: No itching, burning, rashes, or lesions   All other review of systems is negative unless indicated above.

## 2024-02-28 NOTE — ED PROVIDER NOTE - PROGRESS NOTE DETAILS
Billy WILLIAMSON: CT showing colitis of the descending and rectosigmoid colon, distended appendix without discrete periappendiceal inflammation. Surgery paged to sarah clinically for appendicitis, will see patient shortly. Billy WILLIAMSON:  patient still having diarrhea, will admit.  surgery will follow.  Low suspicion for appendicitis.

## 2024-02-28 NOTE — ED ADULT NURSE NOTE - NS_SISCREENINGSR_GEN_ALL_ED
I personally evaluated the patient. I reviewed the Resident’s or Physician Assistant’s note (as assigned above), and agree with the findings and plan except as documented in my note.  Here for covid swab Negative

## 2024-02-28 NOTE — ED PROVIDER NOTE - OBJECTIVE STATEMENT
48-year-old male no past medical history presenting with 3 days of bilateral lower abdominal pain associated with diarrhea.  Patient states that the pain is localized bilaterally across the lower abdomen, and has been having around 15 episodes of nonbloody diarrhea per day.    Denies any recent travel, no recent hospitalizations,  and no recent antibiotic use. No nausea or vomiting.   no fever no chills. 48-year-old male no past medical history presenting with 3 days of bilateral lower abdominal pain associated with diarrhea.  Patient states that the pain is localized bilaterally across the lower abdomen, and has been having around 15 episodes of nonbloody diarrhea per day.    Denies any recent travel, no recent hospitalizations,  and no recent antibiotic use. No nausea or vomiting.   no fever no chills.    PMD dr vgea

## 2024-02-28 NOTE — H&P ADULT - PROBLEM SELECTOR PLAN 1
p/w abdominal pain, diarrhea for few days   CT ap shows:    started on clear liq diet   Start Cipro 500 q12, Flagyl 500 q8  f/u gi pcr, stool culture, cdiff  started on gentle IVF   zofran prn  replace electrolytes as needed p/w abdominal pain, diarrhea for few days   CT a/p- Colitis of the descending and rectosigmoid colon.  Distended appendix without discrete periappendiceal inflammation.  Pt seen by surgery team rec was for no intervention- fluids and po challange    started on clear liq diet   Start Cipro 500 q12, Flagyl 500 q8  f/u gi pcr, stool culture, cdiff  started on gentle IVF   zofran prn  replace electrolytes as needed p/w abdominal pain, diarrhea for few days   CT a/p- Colitis of the descending and rectosigmoid colon.  Distended appendix without discrete periappendiceal inflammation.  Pt seen by surgery team rec was for no intervention- fluids and po challenge    started on clear liq diet   Start Rocephin 1g , Flagyl 500 q8  f/u gi pcr, stool culture, cdiff, stool calprotectin   started on gentle IVF   zofran prn  replace electrolytes as needed

## 2024-02-28 NOTE — CONSULT NOTE ADULT - SUBJECTIVE AND OBJECTIVE BOX
INCOMPLETE    GENERAL SURGERY CONSULT NOTE    Patient is a 48y old  Male who presents with a chief complaint of     HPI:      PAST MEDICAL & SURGICAL HISTORY:  BPH (benign prostatic hyperplasia)      Kidney stones      H/O cystoscopy          Review of Systems:    I have reviewed 9 systems with the patient and the only positive findings were     MEDICATIONS  (STANDING):    MEDICATIONS  (PRN):      Allergies    No Known Allergies    Intolerances        Social History:      FAMILY HISTORY:      Vital Signs Last 24 Hrs  T(C): 37.1 (:13), Max: 37.1 (2024 01:13)  T(F): 98.7 (:13), Max: 98.7 (2024 01:13)  HR: 76 (:13) (76 - 76)  BP: 107/76 (:13) (107/76 - 107/76)  BP(mean): --  RR: 18 (:13) (18 - 18)  SpO2: 97% (:) (97% - 97%)    Parameters below as of :13  Patient On (Oxygen Delivery Method): room air        Physical Exam:  Vital Signs Last 24 Hrs  T(C): 37.1 (:13), Max: 37.1 (:13)  T(F): 98.7 (:13), Max: 98.7 (2024 01:13)  HR: 76 (:13) (76 - 76)  BP: 107/76 (:13) (107/76 - 107/76)  BP(mean): --  RR: 18 (:13) (18 - 18)  SpO2: 97% (:13) (97% - 97%)    Parameters below as of :13  Patient On (Oxygen Delivery Method): room air        General:  A&Ox3,Appears stated age, No acute distress,  Head: NC/AT  EENT: PERRLA. EOMI. Conjunctiva and sclera clear. Pharynx clear.  Neck: Supple. No JVD  Lungs: CTA B/l. Nonlabored Respirations  CV: +S1S2, RRR  Abdomen: Soft, Nondistended,  Nontender, no guarding, no rebound  Extremities: Warm and well perfused. 2+ peripheral pulses b/l. Calf soft, nontender b/l. No pedal edema.            LABS:                        13.5   4.94  )-----------( 286      ( 2024 02:00 )             40.6         138  |  108  |  17  ----------------------------<  121<H>  4.2   |  25  |  0.76    Ca    8.3<L>      2024 02:00  Phos  3.9       Mg     2.3         TPro  6.8  /  Alb  3.1<L>  /  TBili  0.4  /  DBili  x   /  AST  27  /  ALT  46  /  AlkPhos  97      LIVER FUNCTIONS - ( 2024 02:00 )  Alb: 3.1 g/dL / Pro: 6.8 g/dL / ALK PHOS: 97 U/L / ALT: 46 U/L DA / AST: 27 U/L / GGT: x             Urinalysis Basic - ( 2024 04:00 )    Color: Yellow / Appearance: Clear / S.021 / pH: x  Gluc: x / Ketone: Negative mg/dL  / Bili: Negative / Urobili: 0.2 mg/dL   Blood: x / Protein: Negative mg/dL / Nitrite: Negative   Leuk Esterase: Trace / RBC: 0 /HPF / WBC 3 /HPF   Sq Epi: x / Non Sq Epi: x / Bacteria: Few /HPF        RADIOLOGY & ADDITIONAL STUDIES: GENERAL SURGERY CONSULT NOTE    Patient is a 48y old  Male who presents with a chief complaint of Abd Pain x 4 days     HPI:  47y/o male with no sign. PMHx presents to the ED with Abd pain and multiple loose stools x 4days. Pain is mostly periumbilical /mid lower abdominal , RLQ pain, described as crampy and persistent with no nausea , vomiting fever or chills. Also denies CP, SOB or any other complaints at this time. ,    PAST MEDICAL & SURGICAL HISTORY:  BPH (benign prostatic hyperplasia)      Kidney stones      H/O cystoscopy         MEDICATIONS  (STANDING):    MEDICATIONS  (PRN):      Allergies    No Known Allergies    Intolerances        Social History:      FAMILY HISTORY:      Vital Signs Last 24 Hrs  T(C): 37.1 (:13), Max: 37.1 (:)  T(F): 98.7 (:), Max: 98.7 (:)  HR: 76 (:13) (76 - 76)  BP: 107/76 (:13) (107/76 - 107/76)  BP(mean): --  RR: 18 (:13) (18 - 18)  SpO2: 97% (:) (97% - 97%)    Parameters below as of   Patient On (Oxygen Delivery Method): room air        Physical Exam:  Vital Signs Last 24 Hrs  T(C): 37.1 (:13), Max: 37.1 (:13)  T(F): 98.7 (:13), Max: 98.7 (2024 01:13)  HR: 76 (:13) (76 - 76)  BP: 107/76 (:13) (107/76 - 107/76)  BP(mean): --  RR: 18 (:13) (18 - 18)  SpO2: 97% (:) (97% - 97%)    Parameters below as of 2024 01:13  Patient On (Oxygen Delivery Method): room air        General:  A&Ox3,Appears stated age, No acute distress,  Head: NC/AT  EENT: PERRLA. EOMI. Conjunctiva and sclera clear. Pharynx clear.  Neck: Supple. No JVD  Lungs: CTA B/l. Nonlabored Respirations  CV: +S1S2, RRR  Abdomen: Soft, Nondistended,  ++mild mid lower abdominal  and periumbilical tenderness, , no guarding, no rebound  Extremities: Warm and well perfused. 2+ peripheral pulses b/l. Calf soft, nontender b/l. No pedal edema.            LABS:                        13.5   4.94  )-----------( 286      ( 2024 02:00 )             40.6         138  |  108  |  17  ----------------------------<  121<H>  4.2   |  25  |  0.76    Ca    8.3<L>      2024 02:00  Phos  3.9       Mg     2.3         TPro  6.8  /  Alb  3.1<L>  /  TBili  0.4  /  DBili  x   /  AST  27  /  ALT  46  /  AlkPhos  97      LIVER FUNCTIONS - ( 2024 02:00 )  Alb: 3.1 g/dL / Pro: 6.8 g/dL / ALK PHOS: 97 U/L / ALT: 46 U/L DA / AST: 27 U/L / GGT: x             Urinalysis Basic - ( 2024 04:00 )    Color: Yellow / Appearance: Clear / S.021 / pH: x  Gluc: x / Ketone: Negative mg/dL  / Bili: Negative / Urobili: 0.2 mg/dL   Blood: x / Protein: Negative mg/dL / Nitrite: Negative   Leuk Esterase: Trace / RBC: 0 /HPF / WBC 3 /HPF   Sq Epi: x / Non Sq Epi: x / Bacteria: Few /HPF        RADIOLOGY & ADDITIONAL STUDIES:  < from: CT Abdomen and Pelvis w/ IV Cont (24 @ 05:25) >  FINDINGS:  LOWER CHEST: Within normal limits.    LIVER: Left subcentimeter hepatic hypodensity too small to characterize.  BILE DUCTS: Normal caliber.  GALLBLADDER: Within normal limits.  SPLEEN: Punctate calcified granuloma. Otherwise unremarkable.  PANCREAS: Within normal limits.  ADRENALS: Within normal limits.  KIDNEYS/URETERS: Small right renal cyst. Left renal hypodensities too   small to characterize. No hydronephrosis. Punctate bilateral   nonobstructing renal calculi.    BLADDER: Within normal limits.  REPRODUCTIVE ORGANS: Enlarged prostate.    BOWEL: No bowel obstruction. Mild edematous wall thickening of the   descending and rectosigmoid colon with mild associated pericolonic   inflammation. The appendix is thick-walled and distended up to 9 mm   proximally and fluid-filled and distended up to 1.2 cm distally. No   discrete focal periappendiceal inflammation.  PERITONEUM: No ascites.  VESSELS: Within normal limits.  RETROPERITONEUM/LYMPH NODES: No lymphadenopathy.  ABDOMINAL WALL: Small fat-containing left inguinal hernia.  BONES: Degenerative changes.    IMPRESSION:  Colitis of the descending and rectosigmoid colon.    Distended appendix without discrete periappendiceal inflammation.   Recommend clinical correlation for right lower quadrant pain.        < end of copied text >

## 2024-02-29 LAB
ANION GAP SERPL CALC-SCNC: 6 MMOL/L — SIGNIFICANT CHANGE UP (ref 5–17)
BUN SERPL-MCNC: 7 MG/DL — SIGNIFICANT CHANGE UP (ref 7–18)
CALCIUM SERPL-MCNC: 8.5 MG/DL — SIGNIFICANT CHANGE UP (ref 8.4–10.5)
CHLORIDE SERPL-SCNC: 111 MMOL/L — HIGH (ref 96–108)
CHOLEST SERPL-MCNC: 113 MG/DL — SIGNIFICANT CHANGE UP
CO2 SERPL-SCNC: 24 MMOL/L — SIGNIFICANT CHANGE UP (ref 22–31)
CREAT SERPL-MCNC: 0.56 MG/DL — SIGNIFICANT CHANGE UP (ref 0.5–1.3)
CULTURE RESULTS: SIGNIFICANT CHANGE UP
EGFR: 120 ML/MIN/1.73M2 — SIGNIFICANT CHANGE UP
GLUCOSE SERPL-MCNC: 111 MG/DL — HIGH (ref 70–99)
HCT VFR BLD CALC: 41.9 % — SIGNIFICANT CHANGE UP (ref 39–50)
HDLC SERPL-MCNC: 34 MG/DL — LOW
HGB BLD-MCNC: 14.1 G/DL — SIGNIFICANT CHANGE UP (ref 13–17)
LIPID PNL WITH DIRECT LDL SERPL: 58 MG/DL — SIGNIFICANT CHANGE UP
MAGNESIUM SERPL-MCNC: 2.4 MG/DL — SIGNIFICANT CHANGE UP (ref 1.6–2.6)
MCHC RBC-ENTMCNC: 28.7 PG — SIGNIFICANT CHANGE UP (ref 27–34)
MCHC RBC-ENTMCNC: 33.7 GM/DL — SIGNIFICANT CHANGE UP (ref 32–36)
MCV RBC AUTO: 85.3 FL — SIGNIFICANT CHANGE UP (ref 80–100)
NON HDL CHOLESTEROL: 79 MG/DL — SIGNIFICANT CHANGE UP
NRBC # BLD: 0 /100 WBCS — SIGNIFICANT CHANGE UP (ref 0–0)
PHOSPHATE SERPL-MCNC: 3.1 MG/DL — SIGNIFICANT CHANGE UP (ref 2.5–4.5)
PLATELET # BLD AUTO: 289 K/UL — SIGNIFICANT CHANGE UP (ref 150–400)
POTASSIUM SERPL-MCNC: 4.1 MMOL/L — SIGNIFICANT CHANGE UP (ref 3.5–5.3)
POTASSIUM SERPL-SCNC: 4.1 MMOL/L — SIGNIFICANT CHANGE UP (ref 3.5–5.3)
RBC # BLD: 4.91 M/UL — SIGNIFICANT CHANGE UP (ref 4.2–5.8)
RBC # FLD: 13.1 % — SIGNIFICANT CHANGE UP (ref 10.3–14.5)
SODIUM SERPL-SCNC: 141 MMOL/L — SIGNIFICANT CHANGE UP (ref 135–145)
SPECIMEN SOURCE: SIGNIFICANT CHANGE UP
TRIGL SERPL-MCNC: 103 MG/DL — SIGNIFICANT CHANGE UP
WBC # BLD: 4.52 K/UL — SIGNIFICANT CHANGE UP (ref 3.8–10.5)
WBC # FLD AUTO: 4.52 K/UL — SIGNIFICANT CHANGE UP (ref 3.8–10.5)

## 2024-02-29 PROCEDURE — 99232 SBSQ HOSP IP/OBS MODERATE 35: CPT

## 2024-02-29 RX ADMIN — Medication 3 MILLIGRAM(S): at 21:44

## 2024-02-29 RX ADMIN — CEFTRIAXONE 100 MILLIGRAM(S): 500 INJECTION, POWDER, FOR SOLUTION INTRAMUSCULAR; INTRAVENOUS at 13:30

## 2024-02-29 RX ADMIN — Medication 100 MILLIGRAM(S): at 06:05

## 2024-02-29 RX ADMIN — Medication 100 MILLIGRAM(S): at 13:30

## 2024-02-29 RX ADMIN — ENOXAPARIN SODIUM 40 MILLIGRAM(S): 100 INJECTION SUBCUTANEOUS at 18:58

## 2024-02-29 RX ADMIN — Medication 100 MILLIGRAM(S): at 21:44

## 2024-02-29 RX ADMIN — SODIUM CHLORIDE 100 MILLILITER(S): 9 INJECTION, SOLUTION INTRAVENOUS at 06:04

## 2024-02-29 NOTE — PROGRESS NOTE ADULT - ATTENDING COMMENTS
Patient was seen and examined at bedside. reports had three loose watery bowel movements since this morning     ICU Vital Signs Last 24 Hrs  T(C): 36.8 (29 Feb 2024 13:00), Max: 36.8 (29 Feb 2024 13:00)  T(F): 98.2 (29 Feb 2024 13:00), Max: 98.2 (29 Feb 2024 13:00)  HR: 59 (29 Feb 2024 13:00) (57 - 63)  BP: 109/73 (29 Feb 2024 13:00) (101/59 - 109/73)  BP(mean): --  ABP: --  ABP(mean): --  RR: 18 (29 Feb 2024 13:00) (18 - 20)  SpO2: 96% (29 Feb 2024 13:00) (96% - 99%)    O2 Parameters below as of 29 Feb 2024 13:00  Patient On (Oxygen Delivery Method): room air      P/E:  NAD  AAOx3, no focal deficit   CTABL  S1S2 WNL  Abd soft, non tender, BS present   BLLE no edema or calf tenderness     Labs noted     A/P:  Colitis   Intestinal metaplasia of stomach   HLD    Plan:   Can advance diet to sold   Cont IVF  Neg stool work up so far   Cont Ceftriaxone and Flagyl  Cont statin , send lipid profile  Outpatient follow up with GI   Rest of the management as above.   Plan of care was discussed with patient; all questions and concerns were addressed and care was aligned with patient's wishes.  Discussed the plan with NP

## 2024-02-29 NOTE — PROGRESS NOTE ADULT - PROBLEM SELECTOR PLAN 1
CT a/p- Colitis of the descending and rectosigmoid colon.  Distended appendix without discrete periappendiceal inflammation.   clear liq diet advanced to regular   c/w Rocephin 1g , Flagyl 500 q8  f/u gi pcr, stool culture   replace electrolytes as needed

## 2024-02-29 NOTE — PROGRESS NOTE ADULT - SUBJECTIVE AND OBJECTIVE BOX
NP Note discussed with  primary attending    Patient is a 48y old  Male who presents with a chief complaint of Colitis (28 Feb 2024 10:29)      INTERVAL HPI/OVERNIGHT EVENTS: no new complaints    MEDICATIONS  (STANDING):  cefTRIAXone   IVPB      cefTRIAXone   IVPB 1000 milliGRAM(s) IV Intermittent every 24 hours  enoxaparin Injectable 40 milliGRAM(s) SubCutaneous every 24 hours  lactated ringers. 1000 milliLiter(s) (100 mL/Hr) IV Continuous <Continuous>  metroNIDAZOLE  IVPB      metroNIDAZOLE  IVPB 500 milliGRAM(s) IV Intermittent every 8 hours    MEDICATIONS  (PRN):  acetaminophen     Tablet .. 650 milliGRAM(s) Oral every 6 hours PRN Temp greater or equal to 38C (100.4F), Mild Pain (1 - 3)  aluminum hydroxide/magnesium hydroxide/simethicone Suspension 30 milliLiter(s) Oral every 4 hours PRN Dyspepsia  melatonin 3 milliGRAM(s) Oral at bedtime PRN Insomnia      __________________________________________________  REVIEW OF SYSTEMS:    CONSTITUTIONAL: No fever,   EYES: no acute visual disturbances  NECK: No pain or stiffness  RESPIRATORY: No cough; No shortness of breath  CARDIOVASCULAR: No chest pain, no palpitations  GASTROINTESTINAL: No pain. No nausea or vomiting; + diarrhea   NEUROLOGICAL: No headache or numbness, no tremors  MUSCULOSKELETAL: No joint pain, no muscle pain  GENITOURINARY: no dysuria, no frequency, no hesitancy      Vital Signs Last 24 Hrs  T(C): 36.8 (29 Feb 2024 13:00), Max: 36.8 (29 Feb 2024 13:00)  T(F): 98.2 (29 Feb 2024 13:00), Max: 98.2 (29 Feb 2024 13:00)  HR: 59 (29 Feb 2024 13:00) (57 - 63)  BP: 109/73 (29 Feb 2024 13:00) (95/63 - 109/73)  BP(mean): --  RR: 18 (29 Feb 2024 13:00) (18 - 20)  SpO2: 96% (29 Feb 2024 13:00) (96% - 99%)    Parameters below as of 29 Feb 2024 13:00  Patient On (Oxygen Delivery Method): room air        ________________________________________________  PHYSICAL EXAM:  GENERAL: NAD  HEENT: Normocephalic;  conjunctivae and sclerae clear; moist mucous membranes;   NECK : supple  CHEST/LUNG: Clear to ausculitation bilaterally with good air entry   HEART: S1 S2  regular; no murmurs, gallops or rubs  ABDOMEN: Soft, Nontender, Nondistended; Bowel sounds present  EXTREMITIES: no cyanosis; no edema; no calf tenderness  SKIN: warm and dry; no rash  NERVOUS SYSTEM:  Awake and alert; Oriented  to place, person and time ; no new deficits    _________________________________________________  LABS:                        14.1   4.52  )-----------( 289      ( 29 Feb 2024 08:38 )             41.9     02-29    141  |  111<H>  |  7   ----------------------------<  111<H>  4.1   |  24  |  0.56    Ca    8.5      29 Feb 2024 08:38  Phos  3.1     02-29  Mg     2.4     02-29    TPro  6.8  /  Alb  3.1<L>  /  TBili  0.4  /  DBili  x   /  AST  27  /  ALT  46  /  AlkPhos  97  02-28      Urinalysis Basic - ( 29 Feb 2024 08:38 )    Color: x / Appearance: x / SG: x / pH: x  Gluc: 111 mg/dL / Ketone: x  / Bili: x / Urobili: x   Blood: x / Protein: x / Nitrite: x   Leuk Esterase: x / RBC: x / WBC x   Sq Epi: x / Non Sq Epi: x / Bacteria: x      CAPILLARY BLOOD GLUCOSE            RADIOLOGY & ADDITIONAL TESTS:    Imaging Personally Reviewed:  YES/NO    Consultant(s) Notes Reviewed:   YES/ No    Care Discussed with Consultants :     Plan of care was discussed with patient and /or primary care giver; all questions and concerns were addressed and care was aligned with patient's wishes.

## 2024-02-29 NOTE — PROGRESS NOTE ADULT - ASSESSMENT
48 y.o. M with PMHx  of HLD, is not taking any meds daily presented  with crampy  abd pain and multiple loose stools for 4 days   CT a/p- Colitis of the descending and rectosigmoid colon.  Distended appendix without discrete periappendiceal inflammation.   Admitted for colitis   Start Ceftriaxone and Flagyl  Pt  was seen by surgery, no surgical intervention.  C diff negative     Pt was seen at bedside, tolerating clear liquid diet   Reported 3 loose bowel movements,  no abdominal pain, no fever   Tolerating clear liquid diet   Diet advanced to regular

## 2024-03-01 ENCOUNTER — TRANSCRIPTION ENCOUNTER (OUTPATIENT)
Age: 49
End: 2024-03-01

## 2024-03-01 VITALS
DIASTOLIC BLOOD PRESSURE: 71 MMHG | OXYGEN SATURATION: 99 % | HEART RATE: 69 BPM | TEMPERATURE: 98 F | SYSTOLIC BLOOD PRESSURE: 112 MMHG | RESPIRATION RATE: 18 BRPM

## 2024-03-01 LAB
-  AMOXICILLIN/CLAVULANIC ACID: SIGNIFICANT CHANGE UP
-  AMPICILLIN/SULBACTAM: SIGNIFICANT CHANGE UP
-  AMPICILLIN: SIGNIFICANT CHANGE UP
-  AMPICILLIN: SIGNIFICANT CHANGE UP
-  AZTREONAM: SIGNIFICANT CHANGE UP
-  CEFAZOLIN: SIGNIFICANT CHANGE UP
-  CEFEPIME: SIGNIFICANT CHANGE UP
-  CEFOXITIN: SIGNIFICANT CHANGE UP
-  CEFTRIAXONE: SIGNIFICANT CHANGE UP
-  CEFUROXIME: SIGNIFICANT CHANGE UP
-  CIPROFLOXACIN: SIGNIFICANT CHANGE UP
-  CIPROFLOXACIN: SIGNIFICANT CHANGE UP
-  ERTAPENEM: SIGNIFICANT CHANGE UP
-  GENTAMICIN: SIGNIFICANT CHANGE UP
-  IMIPENEM: SIGNIFICANT CHANGE UP
-  LEVOFLOXACIN: SIGNIFICANT CHANGE UP
-  LEVOFLOXACIN: SIGNIFICANT CHANGE UP
-  MEROPENEM: SIGNIFICANT CHANGE UP
-  NITROFURANTOIN: SIGNIFICANT CHANGE UP
-  NITROFURANTOIN: SIGNIFICANT CHANGE UP
-  PIPERACILLIN/TAZOBACTAM: SIGNIFICANT CHANGE UP
-  TETRACYCLINE: SIGNIFICANT CHANGE UP
-  TOBRAMYCIN: SIGNIFICANT CHANGE UP
-  TRIMETHOPRIM/SULFAMETHOXAZOLE: SIGNIFICANT CHANGE UP
-  VANCOMYCIN: SIGNIFICANT CHANGE UP
ANION GAP SERPL CALC-SCNC: 6 MMOL/L — SIGNIFICANT CHANGE UP (ref 5–17)
BUN SERPL-MCNC: 14 MG/DL — SIGNIFICANT CHANGE UP (ref 7–18)
CALCIUM SERPL-MCNC: 8.3 MG/DL — LOW (ref 8.4–10.5)
CHLORIDE SERPL-SCNC: 113 MMOL/L — HIGH (ref 96–108)
CO2 SERPL-SCNC: 23 MMOL/L — SIGNIFICANT CHANGE UP (ref 22–31)
CREAT SERPL-MCNC: 0.6 MG/DL — SIGNIFICANT CHANGE UP (ref 0.5–1.3)
CULTURE RESULTS: ABNORMAL
CULTURE RESULTS: SIGNIFICANT CHANGE UP
CULTURE RESULTS: SIGNIFICANT CHANGE UP
EGFR: 118 ML/MIN/1.73M2 — SIGNIFICANT CHANGE UP
GLUCOSE SERPL-MCNC: 107 MG/DL — HIGH (ref 70–99)
HCT VFR BLD CALC: 40.6 % — SIGNIFICANT CHANGE UP (ref 39–50)
HGB BLD-MCNC: 13.3 G/DL — SIGNIFICANT CHANGE UP (ref 13–17)
MCHC RBC-ENTMCNC: 28.5 PG — SIGNIFICANT CHANGE UP (ref 27–34)
MCHC RBC-ENTMCNC: 32.8 GM/DL — SIGNIFICANT CHANGE UP (ref 32–36)
MCV RBC AUTO: 86.9 FL — SIGNIFICANT CHANGE UP (ref 80–100)
METHOD TYPE: SIGNIFICANT CHANGE UP
METHOD TYPE: SIGNIFICANT CHANGE UP
NRBC # BLD: 0 /100 WBCS — SIGNIFICANT CHANGE UP (ref 0–0)
ORGANISM # SPEC MICROSCOPIC CNT: ABNORMAL
PLATELET # BLD AUTO: 284 K/UL — SIGNIFICANT CHANGE UP (ref 150–400)
POTASSIUM SERPL-MCNC: 4 MMOL/L — SIGNIFICANT CHANGE UP (ref 3.5–5.3)
POTASSIUM SERPL-SCNC: 4 MMOL/L — SIGNIFICANT CHANGE UP (ref 3.5–5.3)
RBC # BLD: 4.67 M/UL — SIGNIFICANT CHANGE UP (ref 4.2–5.8)
RBC # FLD: 13 % — SIGNIFICANT CHANGE UP (ref 10.3–14.5)
SODIUM SERPL-SCNC: 142 MMOL/L — SIGNIFICANT CHANGE UP (ref 135–145)
SPECIMEN SOURCE: SIGNIFICANT CHANGE UP
WBC # BLD: 5.77 K/UL — SIGNIFICANT CHANGE UP (ref 3.8–10.5)
WBC # FLD AUTO: 5.77 K/UL — SIGNIFICANT CHANGE UP (ref 3.8–10.5)

## 2024-03-01 PROCEDURE — 87045 FECES CULTURE AEROBIC BACT: CPT

## 2024-03-01 PROCEDURE — 85027 COMPLETE CBC AUTOMATED: CPT

## 2024-03-01 PROCEDURE — 80053 COMPREHEN METABOLIC PANEL: CPT

## 2024-03-01 PROCEDURE — 87640 STAPH A DNA AMP PROBE: CPT

## 2024-03-01 PROCEDURE — 87328 CRYPTOSPORIDIUM AG IA: CPT

## 2024-03-01 PROCEDURE — 36415 COLL VENOUS BLD VENIPUNCTURE: CPT

## 2024-03-01 PROCEDURE — 80048 BASIC METABOLIC PNL TOTAL CA: CPT

## 2024-03-01 PROCEDURE — 80061 LIPID PANEL: CPT

## 2024-03-01 PROCEDURE — 85025 COMPLETE CBC W/AUTO DIFF WBC: CPT

## 2024-03-01 PROCEDURE — 96375 TX/PRO/DX INJ NEW DRUG ADDON: CPT

## 2024-03-01 PROCEDURE — 81001 URINALYSIS AUTO W/SCOPE: CPT

## 2024-03-01 PROCEDURE — 87046 STOOL CULTR AEROBIC BACT EA: CPT

## 2024-03-01 PROCEDURE — 84100 ASSAY OF PHOSPHORUS: CPT

## 2024-03-01 PROCEDURE — 96374 THER/PROPH/DIAG INJ IV PUSH: CPT

## 2024-03-01 PROCEDURE — 99285 EMERGENCY DEPT VISIT HI MDM: CPT | Mod: 25

## 2024-03-01 PROCEDURE — 87177 OVA AND PARASITES SMEARS: CPT

## 2024-03-01 PROCEDURE — 74177 CT ABD & PELVIS W/CONTRAST: CPT | Mod: MC

## 2024-03-01 PROCEDURE — 83993 ASSAY FOR CALPROTECTIN FECAL: CPT

## 2024-03-01 PROCEDURE — 87493 C DIFF AMPLIFIED PROBE: CPT

## 2024-03-01 PROCEDURE — 87186 SC STD MICRODIL/AGAR DIL: CPT

## 2024-03-01 PROCEDURE — 87641 MR-STAPH DNA AMP PROBE: CPT

## 2024-03-01 PROCEDURE — 87077 CULTURE AEROBIC IDENTIFY: CPT

## 2024-03-01 PROCEDURE — 83690 ASSAY OF LIPASE: CPT

## 2024-03-01 PROCEDURE — 99239 HOSP IP/OBS DSCHRG MGMT >30: CPT | Mod: GC

## 2024-03-01 PROCEDURE — 96376 TX/PRO/DX INJ SAME DRUG ADON: CPT

## 2024-03-01 PROCEDURE — 83735 ASSAY OF MAGNESIUM: CPT

## 2024-03-01 PROCEDURE — 87086 URINE CULTURE/COLONY COUNT: CPT

## 2024-03-01 RX ORDER — METRONIDAZOLE 500 MG
1 TABLET ORAL
Qty: 15 | Refills: 0
Start: 2024-03-01 | End: 2024-03-05

## 2024-03-01 RX ORDER — CEFUROXIME AXETIL 250 MG
1 TABLET ORAL
Qty: 10 | Refills: 0
Start: 2024-03-01 | End: 2024-03-05

## 2024-03-01 RX ADMIN — CEFTRIAXONE 100 MILLIGRAM(S): 500 INJECTION, POWDER, FOR SOLUTION INTRAMUSCULAR; INTRAVENOUS at 12:00

## 2024-03-01 RX ADMIN — SODIUM CHLORIDE 100 MILLILITER(S): 9 INJECTION, SOLUTION INTRAVENOUS at 08:20

## 2024-03-01 RX ADMIN — Medication 100 MILLIGRAM(S): at 13:01

## 2024-03-01 RX ADMIN — Medication 100 MILLIGRAM(S): at 05:40

## 2024-03-01 NOTE — DISCHARGE NOTE PROVIDER - HOSPITAL COURSE
48 y.o. M with PMHx  of HLD, is not taking any meds daily presented  with crampy  abd pain and multiple loose stools for 4 days   CT a/p- Colitis of the descending and rectosigmoid colon.  Distended appendix without discrete periappendiceal inflammation.   Admitted for colitis   Treated with  Ceftriaxone and Flagyl  Pt  was seen by surgery, no surgical intervention.  C diff negative   Pt improved clinically    clear liquid diet  advanced to regular and tolerated well  Given patient's improved clinical status and current hemodynamic stability, decision was made to discharge.  Pt to continue Ceftin and flagyl for 5 more days   Pt was recommended to follow up GI for outpatietn colonoscopy    48 y.o. M with PMHx  of HLD, is not taking any meds daily presented  with crampy  abd pain and multiple loose stools for 4 days   CT a/p- Colitis of the descending and rectosigmoid colon.  Distended appendix without discrete periappendiceal inflammation.   Admitted for colitis   Treated with  Ceftriaxone and Flagyl  Pt  was seen by surgery, no surgical intervention.  C diff negative   Pt improved clinically    clear liquid diet  advanced to regular and tolerated well  Given patient's improved clinical status and current hemodynamic stability, decision was made to discharge.  Pt to continue Ceftin and flagyl for 5 more days   Pt was recommended to follow up GI for outpatient colonoscopy

## 2024-03-01 NOTE — DISCHARGE NOTE PROVIDER - CARE PROVIDER_API CALL
Sushma Smith [facility]  Internal Medicine  [address] [place] [address], [address]  [place] [address]  Phone: [phone]  Fax: [phone]  Follow Up Time:    Baudilio Tello  Gastroenterology  [address] [place] [address], [address]  [place] [address]  Phone: [phone]  Fax: [phone]  Follow Up Time:     Marianna Sunshine  [facility]  [address] [place] [id number]  Phone: [phone]  Fax: [phone]  Follow Up Time:

## 2024-03-01 NOTE — DISCHARGE NOTE PROVIDER - CARE PROVIDERS DIRECT ADDRESSES
[email or web address]_Unknown

## 2024-03-01 NOTE — DISCHARGE NOTE NURSING/CASE MANAGEMENT/SOCIAL WORK - PATIENT PORTAL LINK FT
You can access the FollowMyHealth Patient Portal offered by [facility] by registering at the following website: [email or web address]. By joining [employer]’s FollowMyHealth portal, you will also be able to view your health information using other applications (apps) compatible with our system.

## 2024-03-01 NOTE — DISCHARGE NOTE PROVIDER - NSDCFUSCHEDAPPT_GEN_ALL_CORE_FT
[facility]  FAMILYMED OP [address]  Scheduled Appointment: 03/08/2024    [facility]  ORTHOSURG [address]   Scheduled Appointment: 03/20/2024

## 2024-03-01 NOTE — DISCHARGE NOTE PROVIDER - NSDCCPCAREPLAN_GEN_ALL_CORE_FT
PRINCIPAL DISCHARGE DIAGNOSIS  Diagnosis: Colitis  Assessment and Plan of Treatment: You  presented with abdominal pain and diarrhea.   CT scan of abdomen showed - Colitis of the descending and rectosigmoid colon.  You were reated with  IV antibiotics   Your symptoms improved and diet was  advanced to regular and tolerated well  Please  continue oral antibiotics  Ceftin and flagyl for 5 more days  Please  follow up with gastroenterologist  for outpatient colonoscopy in 6 weeks

## 2024-03-01 NOTE — DISCHARGE NOTE PROVIDER - ATTENDING DISCHARGE PHYSICAL EXAMINATION:
Patient was seen and examined at bedside. Denies any new complains. Diarrhea and abd pain has resolved     ICU Vital Signs Last 24 Hrs  T(C): 36.8 (01 Mar 2024 12:52), Max: 36.8 (01 Mar 2024 12:52)  T(F): 98.2 (01 Mar 2024 12:52), Max: 98.2 (01 Mar 2024 12:52)  HR: 69 (01 Mar 2024 12:52) (54 - 69)  BP: 112/71 (01 Mar 2024 12:52) (102/62 - 112/71)  BP(mean): 79 (29 Feb 2024 20:30) (79 - 79)  ABP: --  ABP(mean): --  RR: 18 (01 Mar 2024 12:52) (18 - 19)  SpO2: 99% (01 Mar 2024 12:52) (96% - 100%)    O2 Parameters below as of 01 Mar 2024 12:52  Patient On (Oxygen Delivery Method): room air    P/E:  NAD  AAOx3, no focal deficit   CTABL  S1S2 WNL  Abd soft, non tender, BS present   BLLE no edema or calf tenderness     labs noted     A/P:  Symptoms resolved. Will complete total 7days of antibiotics. Outpatient followup with GI for colonoscopy after 6 weeks. Discharge plan of care was discussed with patient; all questions and concerns were addressed and care was aligned with patient's wishes.

## 2024-03-01 NOTE — DISCHARGE NOTE PROVIDER - PROVIDER TOKENS
PROVIDER:[TOKEN:[[id number]]] PROVIDER:[TOKEN:[[id number]]],PROVIDER:[TOKEN:[[id number]]]

## 2024-03-01 NOTE — DISCHARGE NOTE PROVIDER - NSDCMRMEDTOKEN_GEN_ALL_CORE_FT
cefuroxime 500 mg oral tablet: 1 tab(s) orally 2 times a day  metroNIDAZOLE 500 mg oral tablet: 1 tab(s) orally every 8 hours

## 2024-03-02 LAB
MRSA PCR RESULT.: SIGNIFICANT CHANGE UP
S AUREUS DNA NOSE QL NAA+PROBE: SIGNIFICANT CHANGE UP

## 2024-03-05 LAB — CALPROTECTIN STL-MCNT: 2050 UG/G — HIGH (ref 0–120)

## 2024-03-08 ENCOUNTER — OUTPATIENT (OUTPATIENT)
Dept: OUTPATIENT SERVICES | Facility: HOSPITAL | Age: 49
LOS: 1 days | End: 2024-03-08
Payer: SELF-PAY

## 2024-03-08 ENCOUNTER — APPOINTMENT (OUTPATIENT)
Dept: FAMILY MEDICINE | Facility: CLINIC | Age: 49
End: 2024-03-08
Payer: COMMERCIAL

## 2024-03-08 VITALS
HEIGHT: 65 IN | RESPIRATION RATE: 18 BRPM | BODY MASS INDEX: 26.99 KG/M2 | WEIGHT: 162 LBS | DIASTOLIC BLOOD PRESSURE: 70 MMHG | HEART RATE: 74 BPM | OXYGEN SATURATION: 98 % | SYSTOLIC BLOOD PRESSURE: 113 MMHG | TEMPERATURE: 97.3 F

## 2024-03-08 DIAGNOSIS — Z00.00 ENCOUNTER FOR GENERAL ADULT MEDICAL EXAMINATION WITHOUT ABNORMAL FINDINGS: ICD-10-CM

## 2024-03-08 DIAGNOSIS — R74.01 ELEVATION OF LEVELS OF LIVER TRANSAMINASE LEVELS: ICD-10-CM

## 2024-03-08 DIAGNOSIS — Z98.890 OTHER SPECIFIED POSTPROCEDURAL STATES: Chronic | ICD-10-CM

## 2024-03-08 PROCEDURE — ZZZZZ: CPT

## 2024-03-08 PROCEDURE — G0463: CPT

## 2024-03-08 RX ORDER — IBUPROFEN 600 MG/1
600 TABLET ORAL EVERY 6 HOURS
Refills: 0 | Status: DISCONTINUED | COMMUNITY
Start: 2024-01-17 | End: 2024-03-08

## 2024-03-08 NOTE — PLAN
[FreeTextEntry1] : 1. Colitis - s/p hospital stay at [facility] from 2/28 to 3/1 for colitis. CT abdomen and pelvis was done and showed "Colitis of the descending and rectosigmoid colon. Distended appendix without discrete periappendiceal inflammation". He was treated with ceftriaxone and flagyl. He was evaluated by surgery, but no surgical intervention was recommended. He was discharged on 5 more days of ceftin and flagyl.  - all symptoms have resolved s/p completing outpatient course of abx - GI follow up outpatient was recommended for colonoscopy -- he has not yet made an appt for follow  up, but states he will  2. HLD - well managed with lifestyle modifications  3. Vitamin D Deficiency  - on vitamin D supplementation  4. Hx of Nephrolithiasis - s/p b/l URS - Follows with urology  5. History of H.Pylori/dyspepsia  - s/p treatment 2022 - now s/p repeat EGD -- due for next EGD in 2027 - follows with GI   f/u for CPE

## 2024-03-08 NOTE — PHYSICAL EXAM
[No Acute Distress] : no acute distress [Well Nourished] : well nourished [Well Developed] : well developed [Well-Appearing] : well-appearing [Normal Sclera/Conjunctiva] : normal sclera/conjunctiva [EOMI] : extraocular movements intact [Normal Outer Ear/Nose] : the outer ears and nose were normal in appearance [Normal Nasal Mucosa] : the nasal mucosa was normal [No Respiratory Distress] : no respiratory distress  [No Accessory Muscle Use] : no accessory muscle use [Clear to Auscultation] : lungs were clear to auscultation bilaterally [Normal Rate] : normal rate  [Regular Rhythm] : with a regular rhythm [Normal S1, S2] : normal S1 and S2 [No Edema] : there was no peripheral edema [Soft] : abdomen soft [Non Tender] : non-tender [Non-distended] : non-distended [Normal Bowel Sounds] : normal bowel sounds [No CVA Tenderness] : no CVA  tenderness [No Spinal Tenderness] : no spinal tenderness [No Joint Swelling] : no joint swelling [Grossly Normal Strength/Tone] : grossly normal strength/tone [Coordination Grossly Intact] : coordination grossly intact [Normal Affect] : the affect was normal [Normal Insight/Judgement] : insight and judgment were intact

## 2024-03-08 NOTE — HISTORY OF PRESENT ILLNESS
[FreeTextEntry1] : Follow Up [de-identified] : The patient is a 48yo male with pmhx of HLD, dyspepsia, vitamin D deficiency, H.pylori, nephrolithiasis s/p b/l URS who presents today for a hospital follow up. He was admitted to [facility] from 2/28 to 3/1 for colitis. CT abdomen and pelvis was done and showed "Colitis of the descending and rectosigmoid colon. Distended appendix without discrete periappendiceal inflammation". He was treated with ceftriaxone and flagyl. He was evaluated by surgery, but no surgical intervention was recommended. He was discharged on 5 more days of ceftin and flagyl. GI follow up outpatient was recommended for colonoscopy   States discharge he has completed his course of abx. States all symptoms have resolved and he feels well. Has not yet followed up with GI.  He follows with urology for hx of nephrolithiasis He follows with GI for hx of dyspepsia, elevated ARLEN and alk phos, and intestinal metaplasia-- now s/o repeat EGD -- next due 2027(3 years) -- per chart review ALT normalized, but Alk phos also normalized on hospital labs

## 2024-03-12 DIAGNOSIS — E55.9 VITAMIN D DEFICIENCY, UNSPECIFIED: ICD-10-CM

## 2024-03-12 DIAGNOSIS — R10.13 EPIGASTRIC PAIN: ICD-10-CM

## 2024-03-12 DIAGNOSIS — K52.9 NONINFECTIVE GASTROENTERITIS AND COLITIS, UNSPECIFIED: ICD-10-CM

## 2024-03-12 DIAGNOSIS — E78.5 HYPERLIPIDEMIA, UNSPECIFIED: ICD-10-CM

## 2024-03-12 DIAGNOSIS — N20.0 CALCULUS OF KIDNEY: ICD-10-CM

## 2024-03-20 ENCOUNTER — OUTPATIENT (OUTPATIENT)
Dept: OUTPATIENT SERVICES | Facility: HOSPITAL | Age: 49
LOS: 1 days | End: 2024-03-20
Payer: SELF-PAY

## 2024-03-20 ENCOUNTER — APPOINTMENT (OUTPATIENT)
Dept: ORTHOPEDIC SURGERY | Facility: HOSPITAL | Age: 49
End: 2024-03-20

## 2024-03-20 VITALS
WEIGHT: 164 LBS | BODY MASS INDEX: 27.32 KG/M2 | TEMPERATURE: 96.9 F | SYSTOLIC BLOOD PRESSURE: 112 MMHG | HEIGHT: 65 IN | OXYGEN SATURATION: 96 % | RESPIRATION RATE: 18 BRPM | DIASTOLIC BLOOD PRESSURE: 74 MMHG | HEART RATE: 68 BPM

## 2024-03-20 DIAGNOSIS — Z98.890 OTHER SPECIFIED POSTPROCEDURAL STATES: Chronic | ICD-10-CM

## 2024-03-20 DIAGNOSIS — M79.9 SOFT TISSUE DISORDER, UNSPECIFIED: ICD-10-CM

## 2024-03-20 DIAGNOSIS — M54.50 LOW BACK PAIN, UNSPECIFIED: ICD-10-CM

## 2024-03-20 PROCEDURE — G0463: CPT

## 2024-03-21 PROBLEM — M54.50 LOW BACK PAIN: Status: ACTIVE | Noted: 2022-06-23

## 2024-03-22 DIAGNOSIS — M54.50 LOW BACK PAIN, UNSPECIFIED: ICD-10-CM

## 2024-04-23 ENCOUNTER — APPOINTMENT (OUTPATIENT)
Dept: GASTROENTEROLOGY | Facility: HOSPITAL | Age: 49
End: 2024-04-23
Payer: COMMERCIAL

## 2024-04-23 ENCOUNTER — OUTPATIENT (OUTPATIENT)
Dept: OUTPATIENT SERVICES | Facility: HOSPITAL | Age: 49
LOS: 1 days | End: 2024-04-23
Payer: SELF-PAY

## 2024-04-23 VITALS
DIASTOLIC BLOOD PRESSURE: 87 MMHG | HEART RATE: 73 BPM | WEIGHT: 163 LBS | SYSTOLIC BLOOD PRESSURE: 115 MMHG | TEMPERATURE: 97.6 F | BODY MASS INDEX: 27.16 KG/M2 | HEIGHT: 65 IN | RESPIRATION RATE: 16 BRPM

## 2024-04-23 DIAGNOSIS — R63.4 ABNORMAL WEIGHT LOSS: ICD-10-CM

## 2024-04-23 DIAGNOSIS — K31.A0 GASTRIC INTESTINAL METAPLASIA, UNSPECIFIED: ICD-10-CM

## 2024-04-23 DIAGNOSIS — Z98.890 OTHER SPECIFIED POSTPROCEDURAL STATES: Chronic | ICD-10-CM

## 2024-04-23 DIAGNOSIS — A04.8 OTHER SPECIFIED BACTERIAL INTESTINAL INFECTIONS: ICD-10-CM

## 2024-04-23 DIAGNOSIS — R10.9 UNSPECIFIED ABDOMINAL PAIN: ICD-10-CM

## 2024-04-23 PROCEDURE — ZZZZZ: CPT | Mod: GC

## 2024-04-23 PROCEDURE — G0463: CPT

## 2024-04-23 NOTE — PHYSICAL EXAM
[Normal] : normal bowel sounds, non-tender, no masses, soft, no no hepato-splenomegaly [Abnormal Walk] : normal gait [No Clubbing, Cyanosis] : no clubbing or cyanosis of the fingernails [No Joint Swelling] : no joint swelling seen [Motor Tone] : muscle strength and tone were normal [] : no rash [No Focal Deficits] : no focal deficits [Oriented To Time, Place, And Person] : oriented to person, place, and time [Normal Insight/Judgment] : insight and judgment were intact

## 2024-04-24 PROBLEM — A04.8 H. PYLORI INFECTION: Status: ACTIVE | Noted: 2022-04-26

## 2024-04-24 PROBLEM — R63.4 UNEXPLAINED WEIGHT LOSS: Status: ACTIVE | Noted: 2022-03-15

## 2024-04-24 PROBLEM — K31.A0 INTESTINAL METAPLASIA OF GASTRIC MUCOSA: Status: ACTIVE | Noted: 2024-01-09

## 2024-04-24 NOTE — ASSESSMENT
[FreeTextEntry1] : 47 yo M with hx of h. pylori infection (eradicated) and GIM (under surveillance) here for follow up after recent hospitalization.  Assessment #Recent admission for acute diarrhea #Abnormal CT finding with wall thickening at descending/sigmoid colon and distended appendix - Likely infectious (C. diff neg but no GI PCR available) vs. ischemic colitis - High fecal francis during admission, but non-specific - Currently, asymptomatic, s/p abx course during admission - Grossly normal colonoscopy in 2022 with good prep, low suspicion for colon cancer  Recommendations - Patient and wife had high anxiety about abnormal CT finding, and was instructed to follow up to rule out colon cancer. Reassured them that given recent colonoscopy findings, the risk of colon cancer is low.  - Reasonable to pursue colonoscopy to assess healing and r/o inflammatory process - Risks and benefits of colonoscopy including but not limited to bleeding, infection, perforation, injury to internal organs were discussed with the patient. Instructions for colon prep and the day of procedure were given to the patient. All questions were answered. Patient is agreeable to the procedure. Pending scheduling. - Bowel prep sent to pharmacy  Case d/w Dr. Dunia Aquino, PGY-5

## 2024-04-24 NOTE — HISTORY OF PRESENT ILLNESS
[FreeTextEntry1] : 47 yo M with hx of h. pylori infection (eradicated) and GIM (under surveillance) here for follow up after recent hospitalization.  Patient was admitted at [facility] for acute onset of loose BM's x 4 days, and found to have wall thickening in descending/sigmoid colon as well as distended appendix. Patient was treated with abx, and referred for outpatient GI for colonoscopy to r/o colon cancer.   Currently, patient is completely symptom-free from GI standpoint.   Patient underwent EGD/Colonoscopy in 2022 that showed HP+ and GIM. Colonoscopy was normal except hemorrhoids. Repeat EGD in 2/2024 showed HP eradication (after treatment) and antrum GIM.   There is no other family history of colon cancer, colon polyp, peptic ulcer disease, female genitourinary disease, liver disease, cholelithiasis, pancreatic disease or cancer, inflammatory bowel disease

## 2024-04-24 NOTE — END OF VISIT
[Time Spent: ___ minutes] : I have spent [unfilled] minutes of time on the encounter. [] : Fellow [FreeTextEntry3] : As modified and discussed with patient MD GLORIA Pulliam [facility] Associate Professor of Medicine [facility]

## 2024-04-24 NOTE — REASON FOR VISIT
[Follow-up] : a follow-up of an existing diagnosis [[employer] ] : provided by [employer]   [Time Spent: ____ minutes] : Total time spent using  services: [unfilled] minutes. The patient's primary language is not English thus required  services. [Post Hospitalization] : a post hospitalization visit [FreeTextEntry1] : acute gastroenteritis loos bowels, abnormal CT scan [Interpreters_IDNumber] : [id number] [TWNoteComboBox1] : Togolese

## 2024-04-25 DIAGNOSIS — K52.9 NONINFECTIVE GASTROENTERITIS AND COLITIS, UNSPECIFIED: ICD-10-CM

## 2024-04-25 DIAGNOSIS — A04.8 OTHER SPECIFIED BACTERIAL INTESTINAL INFECTIONS: ICD-10-CM

## 2024-04-25 DIAGNOSIS — R63.4 ABNORMAL WEIGHT LOSS: ICD-10-CM

## 2024-04-25 DIAGNOSIS — K31.A0 GASTRIC INTESTINAL METAPLASIA, UNSPECIFIED: ICD-10-CM

## 2024-05-22 RX ORDER — SODIUM CHLORIDE 9 MG/ML
500 INJECTION INTRAMUSCULAR; INTRAVENOUS; SUBCUTANEOUS
Refills: 0 | Status: DISCONTINUED | OUTPATIENT
Start: 2024-05-23 | End: 2024-06-06

## 2024-05-22 NOTE — PRE PROCEDURE NOTE - PRE PROCEDURE EVALUATION
Attending Physician: Jeannie                         Procedure: Colonoscopy     Indication for Procedure: Abnormal CT Scan   ________________________________________________________  PAST MEDICAL & SURGICAL HISTORY:  BPH (benign prostatic hyperplasia)      Kidney stones      H/O cystoscopy        ALLERGIES:  No Known Allergies    HOME MEDICATIONS:    AICD/PPM: [ ] yes   [X] no    PERTINENT LAB DATA:                      PHYSICAL EXAMINATION:    T(C): --  HR: --  BP: --  RR: --  SpO2: --    Constitutional: NAD    Neck:  No JVD  Respiratory: CTAB/L  Cardiovascular: S1 and S2  Gastrointestinal: BS+, soft, NT/ND  Extremities: No peripheral edema  Neurological: A/O x 3, no focal deficits        COMMENTS:    The patient is a suitable candidate for the planned procedure unless box checked [ ]  No, explain:

## 2024-05-23 ENCOUNTER — OUTPATIENT (OUTPATIENT)
Dept: OUTPATIENT SERVICES | Facility: HOSPITAL | Age: 49
LOS: 1 days | End: 2024-05-23
Payer: SELF-PAY

## 2024-05-23 ENCOUNTER — TRANSCRIPTION ENCOUNTER (OUTPATIENT)
Age: 49
End: 2024-05-23

## 2024-05-23 ENCOUNTER — RESULT REVIEW (OUTPATIENT)
Age: 49
End: 2024-05-23

## 2024-05-23 VITALS
DIASTOLIC BLOOD PRESSURE: 84 MMHG | OXYGEN SATURATION: 98 % | RESPIRATION RATE: 18 BRPM | SYSTOLIC BLOOD PRESSURE: 122 MMHG | HEART RATE: 73 BPM

## 2024-05-23 VITALS
WEIGHT: 164.02 LBS | OXYGEN SATURATION: 100 % | SYSTOLIC BLOOD PRESSURE: 109 MMHG | HEART RATE: 63 BPM | DIASTOLIC BLOOD PRESSURE: 73 MMHG | HEIGHT: 65 IN | RESPIRATION RATE: 12 BRPM | TEMPERATURE: 98 F

## 2024-05-23 DIAGNOSIS — K52.9 NONINFECTIVE GASTROENTERITIS AND COLITIS, UNSPECIFIED: ICD-10-CM

## 2024-05-23 DIAGNOSIS — Z98.890 OTHER SPECIFIED POSTPROCEDURAL STATES: Chronic | ICD-10-CM

## 2024-05-23 PROCEDURE — 88305 TISSUE EXAM BY PATHOLOGIST: CPT | Mod: 26

## 2024-05-23 PROCEDURE — 88305 TISSUE EXAM BY PATHOLOGIST: CPT

## 2024-05-23 PROCEDURE — 45385 COLONOSCOPY W/LESION REMOVAL: CPT

## 2024-05-23 DEVICE — KIT CVC 2LUM MAC 9FR CHG: Type: IMPLANTABLE DEVICE | Status: FUNCTIONAL

## 2024-05-23 DEVICE — KIT A-LINE 1LUM 20G X 12CM SAFE KIT: Type: IMPLANTABLE DEVICE | Status: FUNCTIONAL

## 2024-05-23 DEVICE — NET RETRV ROT ROTH 2.5MMX230CM: Type: IMPLANTABLE DEVICE | Status: FUNCTIONAL

## 2024-05-23 DEVICE — CATH THERMODIL PACE 7.5FR: Type: IMPLANTABLE DEVICE | Status: FUNCTIONAL

## 2024-05-23 NOTE — ASU DISCHARGE PLAN (ADULT/PEDIATRIC) - NS MD DC FALL RISK RISK
For information on Fall & Injury Prevention, visit: [email or web address] OR  [email or web address] OR  https://www.cdc.gov/steadi/patient.html

## 2024-05-23 NOTE — ASU PATIENT PROFILE, ADULT - PRO INTERPRETER NEED 2
Mexican [place] Counseling:  I discussed with the patient the risks of imiquimod including but not limited to erythema, scaling, itching, weeping, crusting, and pain.  Patient understands that the inflammatory response to imiquimod is variable from person to person and was educated regarded proper titration schedule.  If flu-like symptoms develop, patient knows to discontinue the medication and contact us.

## 2024-05-23 NOTE — ASU PATIENT PROFILE, ADULT - NSICDXPASTMEDICALHX_GEN_ALL_CORE_FT
PAST MEDICAL HISTORY:  BPH (benign prostatic hyperplasia)     Discomfort of back lumbar spine    Kidney stones

## 2024-05-28 LAB — SURGICAL PATHOLOGY STUDY: SIGNIFICANT CHANGE UP

## 2024-05-29 ENCOUNTER — NON-APPOINTMENT (OUTPATIENT)
Age: 49
End: 2024-05-29

## 2024-06-14 ENCOUNTER — OUTPATIENT (OUTPATIENT)
Dept: OUTPATIENT SERVICES | Facility: HOSPITAL | Age: 49
LOS: 1 days | End: 2024-06-14
Payer: SELF-PAY

## 2024-06-14 ENCOUNTER — APPOINTMENT (OUTPATIENT)
Dept: FAMILY MEDICINE | Facility: CLINIC | Age: 49
End: 2024-06-14
Payer: COMMERCIAL

## 2024-06-14 VITALS
HEART RATE: 69 BPM | BODY MASS INDEX: 27.32 KG/M2 | TEMPERATURE: 97.2 F | RESPIRATION RATE: 16 BRPM | HEIGHT: 65 IN | SYSTOLIC BLOOD PRESSURE: 116 MMHG | WEIGHT: 164 LBS | DIASTOLIC BLOOD PRESSURE: 74 MMHG | OXYGEN SATURATION: 99 %

## 2024-06-14 DIAGNOSIS — E55.9 VITAMIN D DEFICIENCY, UNSPECIFIED: ICD-10-CM

## 2024-06-14 DIAGNOSIS — Z87.898 PERSONAL HISTORY OF OTHER SPECIFIED CONDITIONS: ICD-10-CM

## 2024-06-14 DIAGNOSIS — E78.5 HYPERLIPIDEMIA, UNSPECIFIED: ICD-10-CM

## 2024-06-14 DIAGNOSIS — R10.13 EPIGASTRIC PAIN: ICD-10-CM

## 2024-06-14 DIAGNOSIS — Z98.890 OTHER SPECIFIED POSTPROCEDURAL STATES: Chronic | ICD-10-CM

## 2024-06-14 DIAGNOSIS — Z00.00 ENCOUNTER FOR GENERAL ADULT MEDICAL EXAMINATION WITHOUT ABNORMAL FINDINGS: ICD-10-CM

## 2024-06-14 DIAGNOSIS — Z87.19 PERSONAL HISTORY OF OTHER DISEASES OF THE DIGESTIVE SYSTEM: ICD-10-CM

## 2024-06-14 DIAGNOSIS — N20.0 CALCULUS OF KIDNEY: ICD-10-CM

## 2024-06-14 PROCEDURE — 99214 OFFICE O/P EST MOD 30 MIN: CPT

## 2024-06-14 PROCEDURE — G0463: CPT

## 2024-06-14 PROCEDURE — 80053 COMPREHEN METABOLIC PANEL: CPT

## 2024-06-14 PROCEDURE — 82306 VITAMIN D 25 HYDROXY: CPT

## 2024-06-14 RX ORDER — POLYETHYLENE GLYCOL 3350 AND ELECTROLYTES WITH LEMON FLAVOR 236; 22.74; 6.74; 5.86; 2.97 G/4L; G/4L; G/4L; G/4L; G/4L
236 POWDER, FOR SOLUTION ORAL
Qty: 1 | Refills: 0 | Status: DISCONTINUED | COMMUNITY
Start: 2024-04-23 | End: 2024-06-14

## 2024-06-14 NOTE — PLAN
[FreeTextEntry1] : 1. HLD - well managed with lifestyle modifications - repeat lipid panel when patient is fasting  2. Vitamin D Deficiency  - serum vitamin D level ordered - now off vitamin D supplementation  3. Hx of Nephrolithiasis - s/p b/l URS - CMP ordered - Follows with urology  4. History of Dyspepsia and Hx of Colitis - currently asymptomatic and well managed with lifestyle modifications  - s/p repeat EGD -- due for next EGD in 2027 - s/p repeat colonoscopy 5/23/24 -- next due in 7 years - CMP ordered - follows with GI   f/u for CPE

## 2024-06-14 NOTE — REVIEW OF SYSTEMS
[Fever] : no fever [Chills] : no chills [Vision Problems] : no vision problems [Itching] : no itching [Nasal Discharge] : no nasal discharge [Sore Throat] : no sore throat [Chest Pain] : no chest pain [Palpitations] : no palpitations [Shortness Of Breath] : no shortness of breath [Cough] : no cough [Abdominal Pain] : no abdominal pain [Constipation] : no constipation [Diarrhea] : no diarrhea [Vomiting] : no vomiting [Dysuria] : no dysuria [Hematuria] : no hematuria [Frequency] : no frequency [Headache] : no headache [Dizziness] : no dizziness

## 2024-06-14 NOTE — HISTORY OF PRESENT ILLNESS
[FreeTextEntry1] : Follow Up [de-identified] : The patient is a 46yo male with pmhx of HLD, dyspepsia, vitamin D deficiency, H.pylori, nephrolithiasis s/p b/l URS who presents today for a follow up. Feels well today  He follows with urology for hx of nephrolithiasis He follows with GI for hx of dyspepsia, hx of colitis, and intestinal metaplasia-- now s/p repeat EGD -- next due 2027(3 years); and s/p repeat colonoscopy 5/23/24 -- next due in 7 years

## 2024-06-14 NOTE — INTERPRETER SERVICES
[[employer] ] : provided by [employer]   [Interpreters_IDNumber] : [id number] [TWNoteComboBox1] : Filipino

## 2024-06-15 PROBLEM — M54.9 DORSALGIA, UNSPECIFIED: Chronic | Status: ACTIVE | Noted: 2024-05-23

## 2024-06-18 DIAGNOSIS — R10.13 EPIGASTRIC PAIN: ICD-10-CM

## 2024-06-18 DIAGNOSIS — K52.9 NONINFECTIVE GASTROENTERITIS AND COLITIS, UNSPECIFIED: ICD-10-CM

## 2024-06-18 DIAGNOSIS — E55.9 VITAMIN D DEFICIENCY, UNSPECIFIED: ICD-10-CM

## 2024-06-18 DIAGNOSIS — N20.0 CALCULUS OF KIDNEY: ICD-10-CM

## 2024-06-18 DIAGNOSIS — E78.5 HYPERLIPIDEMIA, UNSPECIFIED: ICD-10-CM

## 2024-06-18 RX ORDER — CHOLECALCIFEROL (VITAMIN D3) 1250 MCG
1.25 MG CAPSULE ORAL
Qty: 13 | Refills: 1 | Status: ACTIVE | COMMUNITY
Start: 2023-12-11 | End: 1900-01-01

## 2024-06-19 ENCOUNTER — NON-APPOINTMENT (OUTPATIENT)
Age: 49
End: 2024-06-19

## 2024-06-20 LAB
25(OH)D3 SERPL-MCNC: 24.8 NG/ML
ALBUMIN SERPL ELPH-MCNC: 4.3 G/DL
ALP BLD-CCNC: 149 U/L
ALT SERPL-CCNC: 52 U/L
ANION GAP SERPL CALC-SCNC: 8 MMOL/L
AST SERPL-CCNC: 33 U/L
BILIRUB SERPL-MCNC: 0.3 MG/DL
BUN SERPL-MCNC: 16 MG/DL
CALCIUM SERPL-MCNC: 9.4 MG/DL
CHLORIDE SERPL-SCNC: 105 MMOL/L
CO2 SERPL-SCNC: 24 MMOL/L
CREAT SERPL-MCNC: 0.63 MG/DL
EGFR: 116 ML/MIN/1.73M2
GLUCOSE SERPL-MCNC: 99 MG/DL
POTASSIUM SERPL-SCNC: 5 MMOL/L
PROT SERPL-MCNC: 7.2 G/DL
SODIUM SERPL-SCNC: 138 MMOL/L

## 2024-07-11 ENCOUNTER — OUTPATIENT (OUTPATIENT)
Dept: OUTPATIENT SERVICES | Facility: HOSPITAL | Age: 49
LOS: 1 days | End: 2024-07-11
Payer: SELF-PAY

## 2024-07-11 ENCOUNTER — LABORATORY RESULT (OUTPATIENT)
Age: 49
End: 2024-07-11

## 2024-07-11 ENCOUNTER — APPOINTMENT (OUTPATIENT)
Dept: INTERNAL MEDICINE | Facility: CLINIC | Age: 49
End: 2024-07-11
Payer: COMMERCIAL

## 2024-07-11 VITALS
DIASTOLIC BLOOD PRESSURE: 69 MMHG | HEART RATE: 62 BPM | SYSTOLIC BLOOD PRESSURE: 106 MMHG | HEIGHT: 65 IN | OXYGEN SATURATION: 99 % | TEMPERATURE: 97.5 F | BODY MASS INDEX: 26.33 KG/M2 | WEIGHT: 158 LBS

## 2024-07-11 DIAGNOSIS — Z98.890 OTHER SPECIFIED POSTPROCEDURAL STATES: Chronic | ICD-10-CM

## 2024-07-11 DIAGNOSIS — Z00.00 ENCOUNTER FOR GENERAL ADULT MEDICAL EXAMINATION WITHOUT ABNORMAL FINDINGS: ICD-10-CM

## 2024-07-11 PROCEDURE — 80061 LIPID PANEL: CPT

## 2024-07-11 PROCEDURE — 85025 COMPLETE CBC W/AUTO DIFF WBC: CPT

## 2024-07-11 PROCEDURE — ZZZZZ: CPT

## 2024-07-11 PROCEDURE — 81001 URINALYSIS AUTO W/SCOPE: CPT

## 2024-07-11 PROCEDURE — G0463: CPT

## 2024-07-12 ENCOUNTER — NON-APPOINTMENT (OUTPATIENT)
Age: 49
End: 2024-07-12

## 2024-07-12 LAB
APPEARANCE: ABNORMAL
BASOPHILS # BLD AUTO: 0.02 K/UL
BASOPHILS NFR BLD AUTO: 0.4 %
BILIRUBIN URINE: NEGATIVE
CHOLEST SERPL-MCNC: 144 MG/DL
COLOR: YELLOW
EOSINOPHIL # BLD AUTO: 0.13 K/UL
EOSINOPHIL NFR BLD AUTO: 2.3 %
HCT VFR BLD CALC: 45.6 %
HDLC SERPL-MCNC: 42 MG/DL
HGB BLD-MCNC: 14.4 G/DL
IMM GRANULOCYTES NFR BLD AUTO: 0.2 %
KETONES URINE: NEGATIVE MG/DL
LDLC SERPL CALC-MCNC: 89 MG/DL
LEUKOCYTE ESTERASE URINE: NEGATIVE
LYMPHOCYTES # BLD AUTO: 1.79 K/UL
LYMPHOCYTES NFR BLD AUTO: 31.8 %
MCHC RBC-ENTMCNC: 28.3 PG
MCHC RBC-ENTMCNC: 31.6 GM/DL
MCV RBC AUTO: 89.8 FL
MONOCYTES # BLD AUTO: 0.45 K/UL
MONOCYTES NFR BLD AUTO: 8 %
NEUTROPHILS # BLD AUTO: 3.23 K/UL
NEUTROPHILS NFR BLD AUTO: 57.3 %
NONHDLC SERPL-MCNC: 102 MG/DL
PH URINE: 6
PLATELET # BLD AUTO: 274 K/UL
PROTEIN URINE: NORMAL MG/DL
RBC # BLD: 5.08 M/UL
RBC # FLD: 13.4 %
SPECIFIC GRAVITY URINE: 1.03
TRIGL SERPL-MCNC: 65 MG/DL
UROBILINOGEN URINE: 0.2 MG/DL
WBC # FLD AUTO: 5.63 K/UL

## 2024-07-17 ENCOUNTER — APPOINTMENT (OUTPATIENT)
Dept: ULTRASOUND IMAGING | Facility: CLINIC | Age: 49
End: 2024-07-17

## 2024-07-17 DIAGNOSIS — R10.9 UNSPECIFIED ABDOMINAL PAIN: ICD-10-CM

## 2024-07-17 PROCEDURE — 76775 US EXAM ABDO BACK WALL LIM: CPT

## 2024-07-22 NOTE — REVIEW OF SYSTEMS
Patient : Angle Maier Age: 67 year old Sex: female   MRN: [id number] Encounter Date: 7/22/2024      History     Chief Complaint   Patient presents with    Chest Pain     HPI    Patient is a 67-year-old female with no significant past medical history not on any medications presenting to the emergency department with abrupt onset bilateral chest burning sensation lasted for less than 15 minutes.  She was currently babysitting her grandchildren when this occurred.  There is no real inciting factor.  She felt somewhat sweaty chest burning mildly lightheaded and near syncopal.  She was able to lay on the ground and the symptoms abated on their own.  She did have 1 episode years ago similar to this while driving but lasted the last amount of time.  She has a known history of a right bundle branch block and has undergone an echocardiogram in the past and was told that this was normal.  She does not have any cardiac risk factors of hypertension hyperlipidemia diabetes smoking or any prior cardiac history.  Currently she has no symptoms at this time and is felt improved ever since she laid down.    No Known Allergies    There are no discharge medications for this patient.      No past medical history on file.    No past surgical history on file.    No family history on file.    Social History     Tobacco Use    Smoking status: Never    Smokeless tobacco: Never   Vaping Use    Vaping status: never used   Substance Use Topics    Alcohol use: Yes     Comment: wine socially    Drug use: Never       E-cigarette/Vaping    E-Cigarette/Vaping Use Never Used      E-Cigarette/Vaping Substances & Devices       Review of Systems   Constitutional:  Positive for diaphoresis. Negative for activity change, appetite change, chills, fatigue and fever.   HENT: Negative.     Respiratory: Negative.     Cardiovascular:  Positive for chest pain. Negative for palpitations and leg swelling.   Gastrointestinal: Negative.    Genitourinary: Negative.     Musculoskeletal: Negative.    Skin: Negative.    Neurological:  Positive for light-headedness.       Physical Exam     ED Triage Vitals [07/22/24 1118]   ED Triage Vitals Group      Temp 97.6 °F (36.4 °C)      Heart Rate (!) 59      Resp 15      BP (!) 179/81      SpO2 97 %      EtCO2 mmHg       Height 5' 8\" (1.727 m)      Weight 130 lb (59 kg)      Weight Scale Used       BMI (Calculated) 19.77      IBW/kg (Calculated) 63.9       Physical Exam  Vitals and nursing note reviewed.   Constitutional:       General: She is not in acute distress.     Appearance: Normal appearance. She is not ill-appearing or toxic-appearing.   HENT:      Mouth/Throat:      Mouth: Mucous membranes are moist.   Cardiovascular:      Rate and Rhythm: Normal rate and regular rhythm.      Pulses: Normal pulses.      Heart sounds: Normal heart sounds. Heart sounds not distant. No murmur heard.  Pulmonary:      Effort: Pulmonary effort is normal. No tachypnea.   Chest:      Chest wall: No tenderness or edema.   Abdominal:      General: Abdomen is flat.      Tenderness: There is no abdominal tenderness.   Musculoskeletal:         General: Normal range of motion.      Cervical back: Normal range of motion and neck supple.   Skin:     General: Skin is warm and dry.      Capillary Refill: Capillary refill takes less than 2 seconds.   Neurological:      General: No focal deficit present.      Mental Status: She is alert and oriented to person, place, and time.   Psychiatric:         Mood and Affect: Mood normal.         ED Course     Procedures    Lab Results     Results for orders placed or performed during the hospital encounter of 07/22/24   Comprehensive Metabolic Panel   Result Value Ref Range    Fasting Status      Sodium 138 135 - 145 mmol/L    Potassium 3.8 3.4 - 5.1 mmol/L    Chloride 100 97 - 110 mmol/L    Carbon Dioxide 27 21 - 32 mmol/L    Anion Gap 15 7 - 19 mmol/L    Glucose 116 (H) 70 - 99 mg/dL    BUN 17 6 - 20 mg/dL    Creatinine  0.60 0.51 - 0.95 mg/dL    Glomerular Filtration Rate >90 >=60    BUN/Cr 28 (H) 7 - 25    Calcium 9.1 8.4 - 10.2 mg/dL    Bilirubin, Total 0.4 0.2 - 1.0 mg/dL    GOT/AST 27 <=37 Units/L    GPT/ALT 30 <64 Units/L    Alkaline Phosphatase 76 45 - 117 Units/L    Albumin 4.2 3.6 - 5.1 g/dL    Protein, Total 7.6 6.4 - 8.2 g/dL    Globulin 3.4 2.0 - 4.0 g/dL    A/G Ratio 1.2 1.0 - 2.4   TROPONIN I, HIGH SENSITIVITY   Result Value Ref Range    Troponin I, High Sensitivity 4 <52 ng/L   NT proBNP   Result Value Ref Range    NT-proBNP 155 (H) <=125 pg/mL   D Dimer, Quantitative   Result Value Ref Range    D Dimer, Quantitative 0.19 <0.57 mg/L (FEU)   Magnesium   Result Value Ref Range    Magnesium 1.8 1.7 - 2.4 mg/dL   Lipase   Result Value Ref Range    Lipase 55 15 - 77 Units/L   Urinalysis & Reflex Microscopy With Culture If Indicated   Result Value Ref Range    COLOR, URINALYSIS Straw     APPEARANCE, URINALYSIS Clear     GLUCOSE, URINALYSIS Negative Negative mg/dL    BILIRUBIN, URINALYSIS Negative Negative    KETONES, URINALYSIS Negative Negative mg/dL    SPECIFIC GRAVITY, URINALYSIS 1.014 1.005 - 1.030    OCCULT BLOOD, URINALYSIS Negative Negative    PH, URINALYSIS 7.5 (H) 5.0 - 7.0    PROTEIN, URINALYSIS Negative Negative mg/dL    UROBILINOGEN, URINALYSIS 0.2 0.2, 1.0 mg/dL    NITRITE, URINALYSIS Negative Negative    LEUKOCYTE ESTERASE, URINALYSIS Negative Negative   CBC with Automated Differential (performable only)   Result Value Ref Range    WBC 6.9 4.2 - 11.0 K/mcL    RBC 4.35 4.00 - 5.20 mil/mcL    HGB 12.9 12.0 - 15.5 g/dL    HCT 38.4 36.0 - 46.5 %    MCV 88.3 78.0 - 100.0 fl    MCH 29.7 26.0 - 34.0 pg    MCHC 33.6 32.0 - 36.5 g/dL    RDW-CV 12.3 11.0 - 15.0 %    RDW-SD 39.7 39.0 - 50.0 fL     140 - 450 K/mcL    NRBC 0 <=0 /100 WBC    Neutrophil, Percent 60 %    Lymphocytes, Percent 30 %    Mono, Percent 7 %    Eosinophils, Percent 2 %    Basophils, Percent 1 %    Immature Granulocytes 0 %    Absolute  Neutrophils 4.2 1.8 - 7.7 K/mcL    Absolute Lymphocytes 2.1 1.0 - 4.0 K/mcL    Absolute Monocytes 0.5 0.3 - 0.9 K/mcL    Absolute Eosinophils  0.1 0.0 - 0.5 K/mcL    Absolute Basophils 0.0 0.0 - 0.3 K/mcL    Absolute Immature Granulocytes 0.0 0.0 - 0.2 K/mcL   Gold Top   Result Value Ref Range    Extra Tube Hold for Add Ons    TROPONIN I, HIGH SENSITIVITY   Result Value Ref Range    Troponin I, High Sensitivity 5 <52 ng/L       EKG Results     EKG Interpretation  Rate: 70  Rhythm: normal sinus rhythm   Abnormality: Inc. RBBB (old)    EKG tracing interpreted by ED physician    Radiology Results     Imaging Results              XR CHEST PA OR AP 1 VIEW (Final result)  Result time 07/22/24 12:11:42      Final result                   Impression:    IMPRESSION:    Negative chest x-ray    Electronically Signed by: Oskar Petty MD  Signed on: 7/22/2024 12:11 PM  Created on Workstation ID: [id number]  Signed on Workstation ID: [id number]               Narrative:    EXAMINATION: XR CHEST AP OR PA    HISTORY: Chest pain    COMPARISON: None    FINDINGS: 1 view (s) of the chest are performed.    The heart and mediastinum are normal. The costophrenic angles are sharp.  The pulmonary vasculature is normal. The lungs are clear without  pneumothorax.                                      ED Medication Orders (From admission, onward)      Ordered Start     Status Ordering Provider    07/22/24 1117 07/22/24 1118  aspirin chewable 324 mg  ONCE         Last MAR action: Given ABIGAIL HORN                 Medical Decision Making    The patient presents with transient acute onset flushing of face, chest, lightheadedness, diaphoresis and all resolved after laying on ground. No active symptoms at this time. She is otherwise very healthy without any cardiac history or risk factors.     Physical exam is notable for no acute findings on exam.     Laboratory studies are significant for negative hs troponin x 2, negative d-dimer, no acute  findings on cbc, cmp.     EKG revealed NSR and old inc RBBB.     cxr Images were reviewed and interpreted independently by me revealed no acute findings and are pending final radiologist read.     Overall the presentations is consistent with possible near syncope / vaso vagal episode and burning atypical chest pain now resolved.    HEART Score Total : 2      The results and care plan were discussed with the patient and all questions were answered.     At this time, the patient is appropriate for discharge to home with close follow up with PCP in 3-5 days. The patient was given verbal and written discharge instructions. Signs and symptoms that should prompt the patient to call 911 or to visit the nearest Emergency Room were discussed with the patient. The patient expressed understanding and agreement with this information and instructions.      Clinical Impression     ED Diagnosis   1. Near syncope        2. Burning chest pain            Disposition        Discharge 7/22/2024  1:29 PM  Angle Maier discharge to home/self care.             Clifford Barajas MD  07/23/24 0883     [Chills] : no chills [Fever] : no fever [Sore Throat] : no sore throat [Nasal Discharge] : no nasal discharge [Chest Pain] : no chest pain [Palpitations] : no palpitations [Shortness Of Breath] : no shortness of breath [Cough] : no cough [Abdominal Pain] : no abdominal pain [Constipation] : no constipation [Nausea] : no nausea [Vomiting] : no vomiting [Diarrhea] : no diarrhea [Melena] : no melena [Dysuria] : no dysuria [Hematuria] : no hematuria [Frequency] : no frequency [Itching] : no itching [Skin Rash] : no skin rash [Headache] : no headache [Dizziness] : no dizziness

## 2024-08-06 ENCOUNTER — APPOINTMENT (OUTPATIENT)
Dept: GASTROENTEROLOGY | Facility: HOSPITAL | Age: 49
End: 2024-08-06

## 2024-08-06 ENCOUNTER — OUTPATIENT (OUTPATIENT)
Dept: OUTPATIENT SERVICES | Facility: HOSPITAL | Age: 49
LOS: 1 days | End: 2024-08-06
Payer: SELF-PAY

## 2024-08-06 DIAGNOSIS — R10.9 UNSPECIFIED ABDOMINAL PAIN: ICD-10-CM

## 2024-08-06 PROBLEM — D12.6 SESSILE SERRATED POLYP OF COLON: Status: ACTIVE | Noted: 2024-08-06

## 2024-08-06 PROBLEM — R79.89 ELEVATED LIVER FUNCTION TESTS: Status: ACTIVE | Noted: 2024-08-06

## 2024-08-06 PROCEDURE — G0463: CPT

## 2024-08-06 PROCEDURE — ZZZZZ: CPT | Mod: GC

## 2024-08-08 PROBLEM — D12.6 TUBULAR ADENOMA OF COLON: Status: ACTIVE | Noted: 2024-08-08

## 2024-08-08 NOTE — HISTORY OF PRESENT ILLNESS
[de-identified] : EGD 2/1/24 Findings:      The examined esophagus was normal.      The entire examined stomach was normal. NBI was used to examine the entire stomach.      The examined duodenum was normal.      Biopsies were taken with a cold forceps on the greater curvature of the stomach, on the       lesser curvature of the stomach, at the incisura, on the greater curvature of the gastric       antrum and on the lesser curvature of the gastric antrum for histology.                                                                 Impression:          - Normal esophagus.                      - Normal stomach.                      - Normal examined duodenum.                      - Biopsies were taken with a cold forceps for histology on the greater                       curvature of the stomach, on the lesser curvature of the stomach, at the                       incisura, on the greater curvature of the gastric antrum and on the lesser      curvature of the gastric antrum.   Path 2/1/24 Specimen(s) Submitted  1 Greater curvature of antrum biopsy  2 Lesser curvature of antrum biopsy  3 Stomach greater curvature biopsy  4 Stomach lesser curvature biopsy  5 Incisura biopsy  Final Diagnosis  1. Stomach, antrum, greater curvature, biopsy:  - Gastric antral mucosa with intestinal metaplasia and  inflammatory reactive changes  - Negative for dysplasia  - Result of immunostain for Helicobacter pylori will be reported  in an addendum.  2. Stomach, antrum, lesser curvature, biopsy:  - Gastric antral mucosa with intestinal metaplasia and  inflammatory reactive changes  - Negative for dysplasia  - No morphological features of Helicobacter microorganisms.  3. Stomach, greater curvature, biopsy:  - Gastric oxyntic mucosa with chronic gastritis  - Negative for intestinal metaplasia and dysplasia  - Result of immunostain for Helicobacter pylori will be reported  inan addendum.  4. Stomach, lesser curvature, biopsy:  - Gastric oxyntic mucosa with mild chronic gastritis  - Negative for intestinal metaplasia and dysplasia  - No morphological features of Helicobacter microorganisms.  5. Stomach, incisura, biopsy:  - Gastric antral -oxyntic transitional mucosa with mild chronic  gastritis  - Negative for intestinal metaplasia and dysplasia  - No morphological features of Helicobacter microorganisms.      4/21/22 Findings:      The Z-line was regular and was found 39 cm from the incisors.      The examined esophagus was normal.      A single 5 mm mucosal papule (nodule) with no bleeding was found in the pre pyloric gastric       body with white plaque atop suggestive of possible intestinal metaplasia. Biopsies were taken       with a cold forceps for histology.      Localized mucosal changes characterized by congestion, erythema and granularity were found in       the gastric fundus. Biopsies were taken with a cold forceps for histology.      The exam of thestomach was otherwise normal.      Biopsies were taken with a cold forceps in the gastric body, at the incisura and in the       gastric antrum for Helicobacter pylori testing (2 bx per site.      The examined duodenum was normal.                                                                                      Impression:          - A single mucosal papule (nodule) found in the stomach. Biopsied.                      - Congested, erythematous and granular mucosa in the gastric fundus. Biopsied.                      - Otherwise normal EGD with no explanation for weight loss on exam. Bx taken                       to r/o HP. Recommendation:      - Await pathology results.                      - Perform a colonoscopy today.   Path 4/21/22 Specimen(s) Submitted  1 Gastric nodule biopsy  2 Gastric, biopsy  3 Fundic biopsy  Final Diagnosis  1. Stomach, nodule, biopsy  - Gastric antral mucosa with intestinal metaplasia and Helicobacter  pylori-associated chronic active gastritis  - Negative for dysplasia  2. Stomach, biopsy  - Gastric oxyntic mucosa with intestinal metaplasia and Helicobacter  pylori-associated chronic active gastritis  - Negative for dysplasia  3. Stomach, fundus, biopsy  - Gastric oxyntic mucosa with Helicobacter pylori-associated chronic  active  gastritis  - Negative for intestinal metaplasia   [FreeTextEntry1] : 5/23/24 Findings:      The perianal and digital rectal examinations were normal.      A 4 mm, non-bleeding polyp was found in the cecum. The polyp was sessile. The polyp was       removed with a cold snare. Resection and retrieval were complete.     A 6 mm polyp was found in the ascending colon. The polyp was semi-pedunculated. The polyp was       removed with a cold snare. Resection and retrieval were complete.      The terminal ileum appeared normal.      The exam was otherwise normal throughout the examined colon.                                                                                                      Impression:          - One 4 mm, non-bleeding polyp in the cecum, removed with a cold snare.                       Resected and retrieved.                      - One 6 mm polyp in the ascending colon, removed with a cold snare. Resected                       and retrieved.                      - Otherwise normal colonoscopy.                      - There were no mucosal abnormalaties to account for elevated fecal                       calporotectin or inflammation in the sigmoid and descending colon on prior                       CT. Etiology possibly secondary to resolved infectious colitis or ischemic                     injury,  Path 5/23/24 Specimen(s) Submitted  1. Cecal polyp  2. Ascending colon polyp  Final Diagnosis  1. Colon, cecum, polyp  - Colonic mucosa with minimal superficial hyperplastic change.  2. Colon, ascending, polyp  - Sessile serrated polyp/adenoma, one fragment.  - Second fragment of colonic mucosa with a minimal focus of  adenomatous change.    4/21/22 Findings:      The terminal ileum appeared normal.      Non-bleeding internal hemorrhoids were found during retroflexion.      The exam was otherwise without abnormality on direct and retroflexion views (cecum, rectum).                                                                                                      Impression:          - Non-bleeding internal hemorrhoids.                      - Otherwise normal colonoscopy with no explanation of weight loss.

## 2024-08-08 NOTE — HISTORY OF PRESENT ILLNESS
[de-identified] : EGD 2/1/24 Findings:      The examined esophagus was normal.      The entire examined stomach was normal. NBI was used to examine the entire stomach.      The examined duodenum was normal.      Biopsies were taken with a cold forceps on the greater curvature of the stomach, on the       lesser curvature of the stomach, at the incisura, on the greater curvature of the gastric       antrum and on the lesser curvature of the gastric antrum for histology.                                                                 Impression:          - Normal esophagus.                      - Normal stomach.                      - Normal examined duodenum.                      - Biopsies were taken with a cold forceps for histology on the greater                       curvature of the stomach, on the lesser curvature of the stomach, at the                       incisura, on the greater curvature of the gastric antrum and on the lesser      curvature of the gastric antrum.   Path 2/1/24 Specimen(s) Submitted  1 Greater curvature of antrum biopsy  2 Lesser curvature of antrum biopsy  3 Stomach greater curvature biopsy  4 Stomach lesser curvature biopsy  5 Incisura biopsy  Final Diagnosis  1. Stomach, antrum, greater curvature, biopsy:  - Gastric antral mucosa with intestinal metaplasia and  inflammatory reactive changes  - Negative for dysplasia  - Result of immunostain for Helicobacter pylori will be reported  in an addendum.  2. Stomach, antrum, lesser curvature, biopsy:  - Gastric antral mucosa with intestinal metaplasia and  inflammatory reactive changes  - Negative for dysplasia  - No morphological features of Helicobacter microorganisms.  3. Stomach, greater curvature, biopsy:  - Gastric oxyntic mucosa with chronic gastritis  - Negative for intestinal metaplasia and dysplasia  - Result of immunostain for Helicobacter pylori will be reported  inan addendum.  4. Stomach, lesser curvature, biopsy:  - Gastric oxyntic mucosa with mild chronic gastritis  - Negative for intestinal metaplasia and dysplasia  - No morphological features of Helicobacter microorganisms.  5. Stomach, incisura, biopsy:  - Gastric antral -oxyntic transitional mucosa with mild chronic  gastritis  - Negative for intestinal metaplasia and dysplasia  - No morphological features of Helicobacter microorganisms.      4/21/22 Findings:      The Z-line was regular and was found 39 cm from the incisors.      The examined esophagus was normal.      A single 5 mm mucosal papule (nodule) with no bleeding was found in the pre pyloric gastric       body with white plaque atop suggestive of possible intestinal metaplasia. Biopsies were taken       with a cold forceps for histology.      Localized mucosal changes characterized by congestion, erythema and granularity were found in       the gastric fundus. Biopsies were taken with a cold forceps for histology.      The exam of thestomach was otherwise normal.      Biopsies were taken with a cold forceps in the gastric body, at the incisura and in the       gastric antrum for Helicobacter pylori testing (2 bx per site.      The examined duodenum was normal.                                                                                      Impression:          - A single mucosal papule (nodule) found in the stomach. Biopsied.                      - Congested, erythematous and granular mucosa in the gastric fundus. Biopsied.                      - Otherwise normal EGD with no explanation for weight loss on exam. Bx taken                       to r/o HP. Recommendation:      - Await pathology results.                      - Perform a colonoscopy today.   Path 4/21/22 Specimen(s) Submitted  1 Gastric nodule biopsy  2 Gastric, biopsy  3 Fundic biopsy  Final Diagnosis  1. Stomach, nodule, biopsy  - Gastric antral mucosa with intestinal metaplasia and Helicobacter  pylori-associated chronic active gastritis  - Negative for dysplasia  2. Stomach, biopsy  - Gastric oxyntic mucosa with intestinal metaplasia and Helicobacter  pylori-associated chronic active gastritis  - Negative for dysplasia  3. Stomach, fundus, biopsy  - Gastric oxyntic mucosa with Helicobacter pylori-associated chronic  active  gastritis  - Negative for intestinal metaplasia   [FreeTextEntry1] : 5/23/24 Findings:      The perianal and digital rectal examinations were normal.      A 4 mm, non-bleeding polyp was found in the cecum. The polyp was sessile. The polyp was       removed with a cold snare. Resection and retrieval were complete.     A 6 mm polyp was found in the ascending colon. The polyp was semi-pedunculated. The polyp was       removed with a cold snare. Resection and retrieval were complete.      The terminal ileum appeared normal.      The exam was otherwise normal throughout the examined colon.                                                                                                      Impression:          - One 4 mm, non-bleeding polyp in the cecum, removed with a cold snare.                       Resected and retrieved.                      - One 6 mm polyp in the ascending colon, removed with a cold snare. Resected                       and retrieved.                      - Otherwise normal colonoscopy.                      - There were no mucosal abnormalaties to account for elevated fecal                       calporotectin or inflammation in the sigmoid and descending colon on prior                       CT. Etiology possibly secondary to resolved infectious colitis or ischemic                     injury,  Path 5/23/24 Specimen(s) Submitted  1. Cecal polyp  2. Ascending colon polyp  Final Diagnosis  1. Colon, cecum, polyp  - Colonic mucosa with minimal superficial hyperplastic change.  2. Colon, ascending, polyp  - Sessile serrated polyp/adenoma, one fragment.  - Second fragment of colonic mucosa with a minimal focus of  adenomatous change.    4/21/22 Findings:      The terminal ileum appeared normal.      Non-bleeding internal hemorrhoids were found during retroflexion.      The exam was otherwise without abnormality on direct and retroflexion views (cecum, rectum).                                                                                                      Impression:          - Non-bleeding internal hemorrhoids.                      - Otherwise normal colonoscopy with no explanation of weight loss.

## 2024-08-08 NOTE — END OF VISIT
[] : Fellow [FreeTextEntry3] : As modified and discussed with patient MD GLORIA Pulliam [facility] Associate Professor of Medicine [facility]   [Time Spent: ___ minutes] : I have spent [unfilled] minutes of time on the encounter.

## 2024-08-08 NOTE — END OF VISIT
[] : Fellow [FreeTextEntry3] : As modified and discussed with patient MD GLORIA Pulliam [facility] Associate Professor of Medicine [facility]   [Time Spent: ___ minutes] : I have spent [unfilled] minutes of time on the encounter.

## 2024-08-08 NOTE — REASON FOR VISIT
[Follow-up] : a follow-up of an existing diagnosis [FreeTextEntry1] : gastric intestinal metaplasia. HP

## 2024-08-08 NOTE — ASSESSMENT
[FreeTextEntry1] : 49 yo M with hx of HLD, h. pylori infection (eradicated) and gastric antral GIM (under surveillance) here for follow up.  1. Elevated liver tests; elevated since 7821-6503; ALT/ALP 52/149. 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 Workup including PARRIS, SMA, AMA, IgG, ferritin, WNL. HBV/HCV negative. TTG IgA/total IgA WNL. US 2022 without liver pathology. Former alcohol use, stopped 18 years ago; previously drinking once a month. No FHx liver disease. CT 2/2024 with sub centimeter hypodensity too small to characterize; otherwise, normal liver morphology; normal bile ducts. FIB-4 0.84, excluding advanced fibrosis. Pt counseled on lifestyle modifications including following a Mediterranean diet and exercise with goal of weight loss of ~10%.  2. Gastric IM; noted on mapping EGD 2/2024 within the greater and lesser curvature of gastric antrum; negative in incisura, greater curvature, and lesser curvature. No HP or dysplasia. Repeat in 3 years for continued surveillance.  3. Sessile serrated adenoma x1 on colonoscopy 5/2024. Repeat in 5 years.  4. Elevated fecal calprotectin with descending/sigmoid wall thickening on CT 2/2024; suspect related to infectious colitis. Subsequent colonoscopy 5/2024 with normal mucosa. Currently asymptomatic. Repeat in 6 months.  Plan - counseled on Mediterranean diet/exercise - repeat LFTs in 3 months; would also obtain ceruloplasmin and fecal calprotectin - can consider fibro scan at next visit also - repeat EGD in 3 years - repeat colonoscopy in 5 years - asl patient to return for a Hepatology clinic visit  Discussed with Dr. Dunia Rodrigues, PGY5 GI/Hep Fellow

## 2024-08-08 NOTE — ASSESSMENT
[FreeTextEntry1] : 49 yo M with hx of HLD, h. pylori infection (eradicated) and gastric antral GIM (under surveillance) here for follow up.  1. Elevated liver tests; elevated since 8360-7131; ALT/ALP 52/149. 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 Workup including PARRIS, SMA, AMA, IgG, ferritin, WNL. HBV/HCV negative. TTG IgA/total IgA WNL. US 2022 without liver pathology. Former alcohol use, stopped 18 years ago; previously drinking once a month. No FHx liver disease. CT 2/2024 with sub centimeter hypodensity too small to characterize; otherwise, normal liver morphology; normal bile ducts. FIB-4 0.84, excluding advanced fibrosis. Pt counseled on lifestyle modifications including following a Mediterranean diet and exercise with goal of weight loss of ~10%.  2. Gastric IM; noted on mapping EGD 2/2024 within the greater and lesser curvature of gastric antrum; negative in incisura, greater curvature, and lesser curvature. No HP or dysplasia. Repeat in 3 years for continued surveillance.  3. Sessile serrated adenoma x1 on colonoscopy 5/2024. Repeat in 5 years.  4. Elevated fecal calprotectin with descending/sigmoid wall thickening on CT 2/2024; suspect related to infectious colitis. Subsequent colonoscopy 5/2024 with normal mucosa. Currently asymptomatic. Repeat in 6 months.  Plan - counseled on Mediterranean diet/exercise - repeat LFTs in 3 months; would also obtain ceruloplasmin and fecal calprotectin - can consider fibro scan at next visit also - repeat EGD in 3 years - repeat colonoscopy in 5 years - asl patient to return for a Hepatology clinic visit  Discussed with Dr. Dunia Rodrigues, PGY5 GI/Hep Fellow

## 2024-08-08 NOTE — REVIEW OF SYSTEMS
[Fever] : no fever [Chills] : no chills [Heart Rate Is Slow] : the heart rate was not slow [Shortness Of Breath] : no shortness of breath [Abdominal Pain] : no abdominal pain [Vomiting] : no vomiting [Constipation] : no constipation [Diarrhea] : no diarrhea [Heartburn] : no heartburn [Bloating (gassiness)] : no bloating [Arthralgias (joint pain)] : no arthralgias [Joint Swelling] : no joint swelling [Joint Stiffness] : no joint stiffness

## 2024-08-09 DIAGNOSIS — K31.A0 GASTRIC INTESTINAL METAPLASIA, UNSPECIFIED: ICD-10-CM

## 2024-08-09 DIAGNOSIS — D12.6 BENIGN NEOPLASM OF COLON, UNSPECIFIED: ICD-10-CM

## 2024-08-09 DIAGNOSIS — R79.89 OTHER SPECIFIED ABNORMAL FINDINGS OF BLOOD CHEMISTRY: ICD-10-CM

## 2024-08-13 ENCOUNTER — EMERGENCY (EMERGENCY)
Facility: HOSPITAL | Age: 49
LOS: 1 days | Discharge: HOME | End: 2024-08-13
Attending: EMERGENCY MEDICINE
Payer: MEDICAID

## 2024-08-13 VITALS
HEART RATE: 63 BPM | OXYGEN SATURATION: 98 % | RESPIRATION RATE: 17 BRPM | DIASTOLIC BLOOD PRESSURE: 79 MMHG | TEMPERATURE: 98 F | SYSTOLIC BLOOD PRESSURE: 123 MMHG

## 2024-08-13 VITALS
SYSTOLIC BLOOD PRESSURE: 141 MMHG | WEIGHT: 164.24 LBS | RESPIRATION RATE: 18 BRPM | TEMPERATURE: 99 F | DIASTOLIC BLOOD PRESSURE: 87 MMHG | HEART RATE: 72 BPM | HEIGHT: 65 IN | OXYGEN SATURATION: 97 %

## 2024-08-13 LAB
ALBUMIN SERPL ELPH-MCNC: 3.3 G/DL — LOW (ref 3.5–5)
ALP SERPL-CCNC: 161 U/L — HIGH (ref 40–120)
ALT FLD-CCNC: 62 U/L DA — HIGH (ref 10–60)
ANION GAP SERPL CALC-SCNC: 7 MMOL/L — SIGNIFICANT CHANGE UP (ref 5–17)
APPEARANCE UR: CLEAR — SIGNIFICANT CHANGE UP
AST SERPL-CCNC: 27 U/L — SIGNIFICANT CHANGE UP (ref 10–40)
BACTERIA # UR AUTO: ABNORMAL /HPF
BASOPHILS # BLD AUTO: 0.02 K/UL — SIGNIFICANT CHANGE UP (ref 0–0.2)
BASOPHILS NFR BLD AUTO: 0.3 % — SIGNIFICANT CHANGE UP (ref 0–2)
BILIRUB SERPL-MCNC: 0.3 MG/DL — SIGNIFICANT CHANGE UP (ref 0.2–1.2)
BILIRUB UR-MCNC: NEGATIVE — SIGNIFICANT CHANGE UP
BUN SERPL-MCNC: 17 MG/DL — SIGNIFICANT CHANGE UP (ref 7–18)
CALCIUM SERPL-MCNC: 8.6 MG/DL — SIGNIFICANT CHANGE UP (ref 8.4–10.5)
CHLORIDE SERPL-SCNC: 111 MMOL/L — HIGH (ref 96–108)
CO2 SERPL-SCNC: 23 MMOL/L — SIGNIFICANT CHANGE UP (ref 22–31)
COD CRY URNS QL: PRESENT
COLOR SPEC: YELLOW — SIGNIFICANT CHANGE UP
CREAT SERPL-MCNC: 0.69 MG/DL — SIGNIFICANT CHANGE UP (ref 0.5–1.3)
DIFF PNL FLD: ABNORMAL
EGFR: 113 ML/MIN/1.73M2 — SIGNIFICANT CHANGE UP
EOSINOPHIL # BLD AUTO: 0.11 K/UL — SIGNIFICANT CHANGE UP (ref 0–0.5)
EOSINOPHIL NFR BLD AUTO: 1.6 % — SIGNIFICANT CHANGE UP (ref 0–6)
GLUCOSE SERPL-MCNC: 110 MG/DL — HIGH (ref 70–99)
GLUCOSE UR QL: NEGATIVE MG/DL — SIGNIFICANT CHANGE UP
HCT VFR BLD CALC: 41.7 % — SIGNIFICANT CHANGE UP (ref 39–50)
HGB BLD-MCNC: 14 G/DL — SIGNIFICANT CHANGE UP (ref 13–17)
HYALINE CASTS # UR AUTO: PRESENT
IMM GRANULOCYTES NFR BLD AUTO: 0.4 % — SIGNIFICANT CHANGE UP (ref 0–0.9)
KETONES UR-MCNC: NEGATIVE MG/DL — SIGNIFICANT CHANGE UP
LEUKOCYTE ESTERASE UR-ACNC: NEGATIVE — SIGNIFICANT CHANGE UP
LYMPHOCYTES # BLD AUTO: 1.48 K/UL — SIGNIFICANT CHANGE UP (ref 1–3.3)
LYMPHOCYTES # BLD AUTO: 21.9 % — SIGNIFICANT CHANGE UP (ref 13–44)
MCHC RBC-ENTMCNC: 29.2 PG — SIGNIFICANT CHANGE UP (ref 27–34)
MCHC RBC-ENTMCNC: 33.6 GM/DL — SIGNIFICANT CHANGE UP (ref 32–36)
MCV RBC AUTO: 86.9 FL — SIGNIFICANT CHANGE UP (ref 80–100)
MONOCYTES # BLD AUTO: 0.54 K/UL — SIGNIFICANT CHANGE UP (ref 0–0.9)
MONOCYTES NFR BLD AUTO: 8 % — SIGNIFICANT CHANGE UP (ref 2–14)
NEUTROPHILS # BLD AUTO: 4.59 K/UL — SIGNIFICANT CHANGE UP (ref 1.8–7.4)
NEUTROPHILS NFR BLD AUTO: 67.8 % — SIGNIFICANT CHANGE UP (ref 43–77)
NITRITE UR-MCNC: NEGATIVE — SIGNIFICANT CHANGE UP
NRBC # BLD: 0 /100 WBCS — SIGNIFICANT CHANGE UP (ref 0–0)
PH UR: 6.5 — SIGNIFICANT CHANGE UP (ref 5–8)
PLATELET # BLD AUTO: 235 K/UL — SIGNIFICANT CHANGE UP (ref 150–400)
POTASSIUM SERPL-MCNC: 3.8 MMOL/L — SIGNIFICANT CHANGE UP (ref 3.5–5.3)
POTASSIUM SERPL-SCNC: 3.8 MMOL/L — SIGNIFICANT CHANGE UP (ref 3.5–5.3)
PROT SERPL-MCNC: 6.9 G/DL — SIGNIFICANT CHANGE UP (ref 6–8.3)
PROT UR-MCNC: NEGATIVE MG/DL — SIGNIFICANT CHANGE UP
RBC # BLD: 4.8 M/UL — SIGNIFICANT CHANGE UP (ref 4.2–5.8)
RBC # FLD: 13.1 % — SIGNIFICANT CHANGE UP (ref 10.3–14.5)
RBC CASTS # UR COMP ASSIST: 4 /HPF — SIGNIFICANT CHANGE UP (ref 0–4)
SODIUM SERPL-SCNC: 141 MMOL/L — SIGNIFICANT CHANGE UP (ref 135–145)
SP GR SPEC: 1.02 — SIGNIFICANT CHANGE UP (ref 1–1.03)
UROBILINOGEN FLD QL: 1 MG/DL — SIGNIFICANT CHANGE UP (ref 0.2–1)
WBC # BLD: 6.77 K/UL — SIGNIFICANT CHANGE UP (ref 3.8–10.5)
WBC # FLD AUTO: 6.77 K/UL — SIGNIFICANT CHANGE UP (ref 3.8–10.5)
WBC UR QL: 2 /HPF — SIGNIFICANT CHANGE UP (ref 0–5)

## 2024-08-13 PROCEDURE — 81001 URINALYSIS AUTO W/SCOPE: CPT

## 2024-08-13 PROCEDURE — 96375 TX/PRO/DX INJ NEW DRUG ADDON: CPT

## 2024-08-13 PROCEDURE — 85025 COMPLETE CBC W/AUTO DIFF WBC: CPT

## 2024-08-13 PROCEDURE — 80053 COMPREHEN METABOLIC PANEL: CPT

## 2024-08-13 PROCEDURE — 99284 EMERGENCY DEPT VISIT MOD MDM: CPT | Mod: 25

## 2024-08-13 PROCEDURE — 96374 THER/PROPH/DIAG INJ IV PUSH: CPT

## 2024-08-13 PROCEDURE — 36415 COLL VENOUS BLD VENIPUNCTURE: CPT

## 2024-08-13 PROCEDURE — 99284 EMERGENCY DEPT VISIT MOD MDM: CPT

## 2024-08-13 RX ORDER — CYCLOBENZAPRINE HCL 10 MG
1 TABLET ORAL
Qty: 10 | Refills: 0
Start: 2024-08-13 | End: 2024-08-15

## 2024-08-13 RX ORDER — IBUPROFEN 200 MG
1 TABLET ORAL
Qty: 20 | Refills: 0
Start: 2024-08-13 | End: 2024-08-17

## 2024-08-13 RX ORDER — CYCLOBENZAPRINE HCL 10 MG
10 TABLET ORAL ONCE
Refills: 0 | Status: COMPLETED | OUTPATIENT
Start: 2024-08-13 | End: 2024-08-13

## 2024-08-13 RX ORDER — ACETAMINOPHEN 500 MG
2 TABLET ORAL
Qty: 30 | Refills: 0
Start: 2024-08-13 | End: 2024-08-17

## 2024-08-13 RX ORDER — KETOROLAC TROMETHAMINE 10 MG
15 TABLET ORAL ONCE
Refills: 0 | Status: DISCONTINUED | OUTPATIENT
Start: 2024-08-13 | End: 2024-08-13

## 2024-08-13 RX ORDER — ACETAMINOPHEN 500 MG
650 TABLET ORAL ONCE
Refills: 0 | Status: COMPLETED | OUTPATIENT
Start: 2024-08-13 | End: 2024-08-13

## 2024-08-13 RX ADMIN — Medication 10 MILLIGRAM(S): at 08:04

## 2024-08-13 RX ADMIN — Medication 15 MILLIGRAM(S): at 09:29

## 2024-08-13 RX ADMIN — Medication 15 MILLIGRAM(S): at 08:04

## 2024-08-13 RX ADMIN — Medication 650 MILLIGRAM(S): at 08:04

## 2024-08-13 RX ADMIN — Medication 2 MILLIGRAM(S): at 08:05

## 2024-08-13 RX ADMIN — Medication 2 MILLIGRAM(S): at 08:20

## 2024-08-13 RX ADMIN — Medication 650 MILLIGRAM(S): at 09:04

## 2024-08-13 NOTE — ED PROVIDER NOTE - CLINICAL SUMMARY MEDICAL DECISION MAKING FREE TEXT BOX
48-year-old male who presented with left lower back pain.  See exam above.  No red flag signs.   labs and urinalysis unremarkable.  Patient received Tylenol, Toradol, Flexeril and a small dose of morphine in the emergency department.  He had a steady gait and again was neurovascularly intact   with no weakness.  Will discharge him with short course of Tylenol, NSAIDs and muscle relaxant.

## 2024-08-13 NOTE — ED PROVIDER NOTE - NSFOLLOWUPINSTRUCTIONS_ED_ALL_ED_FT
You were evaluated for left lower back pain.  It is likely secondary to muscle strain.  Your laboratories and urinalysis were negative.  Please take medications as prescribed. Follow- up with your doctor.    Strain    A strain is a stretch or tear in one of the muscles in your body. This is caused by an injury to the area such as a twisting mechanism. Symptoms include pain, swelling, or bruising. Rest that area over the next several days and slowly resume activity when tolerated. Ice can help with swelling and pain.     SEEK IMMEDIATE MEDICAL CARE IF YOU HAVE ANY OF THE FOLLOWING SYMPTOMS: worsening pain, inability to move that body part, numbness or tingling.

## 2024-08-13 NOTE — ED PROVIDER NOTE - PHYSICAL EXAMINATION
General: Patient well appearing, vital signs within normal limits  HEENT: MMM, trachea midline  Respiratory: No respiratory distress  GI: Soft, nontender  : No CVA tenderness bilaterally  MSK: 5/5 strength with flexion and extension at the ankle/knee/hip bilaterally, sensation grossly intact, 2/4 DTRs of bilateral lower extremities, steady gait; no tenderness to palpation over the lumbar spines, some mild tenderness to palpation over the left lower lumbar paraspinal muscles  Neuro: Moves all extremities, see above  Skin: No rashes or lesions

## 2024-08-13 NOTE — ED PROVIDER NOTE - PATIENT PORTAL LINK FT
You can access the FollowMyHealth Patient Portal offered by [facility] by registering at the following website: [email or web address] By joining [employer]’s FollowMyHealth portal, you will also be able to view your health information using other applications (apps) compatible with our system.

## 2024-08-13 NOTE — ED ADULT NURSE NOTE - NSFALLUNIVINTERV_ED_ALL_ED
Bed/Stretcher in lowest position, wheels locked, appropriate side rails in place/Call bell, personal items and telephone in reach/Instruct patient to call for assistance before getting out of bed/chair/stretcher/Non-slip footwear applied when patient is off stretcher/[place] to call system/Physically safe environment - no spills, clutter or unnecessary equipment/Purposeful proactive rounding/Room/bathroom lighting operational, light cord in reach

## 2024-08-13 NOTE — ED ADULT TRIAGE NOTE - CHIEF COMPLAINT QUOTE
Pr c/o left side back pain going to left side of his stomach to his left leg Pt c/o left side back pain going to left side of his stomach to his left leg

## 2024-08-13 NOTE — ED PROVIDER NOTE - OBJECTIVE STATEMENT
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  Patient is able to ambulate.  He has tried Tylenol at home with minimal effect.  Denies any fecal incontinence, urinary retention or saddle anesthesia.

## 2024-08-15 ENCOUNTER — NON-APPOINTMENT (OUTPATIENT)
Age: 49
End: 2024-08-15

## 2024-08-15 PROBLEM — R31.29 MICROSCOPIC HEMATURIA: Status: ACTIVE | Noted: 2024-08-15

## 2024-08-16 ENCOUNTER — LABORATORY RESULT (OUTPATIENT)
Age: 49
End: 2024-08-16

## 2024-08-16 ENCOUNTER — APPOINTMENT (OUTPATIENT)
Dept: UROLOGY | Facility: CLINIC | Age: 49
End: 2024-08-16

## 2024-08-16 ENCOUNTER — APPOINTMENT (OUTPATIENT)
Dept: CT IMAGING | Facility: CLINIC | Age: 49
End: 2024-08-16
Payer: COMMERCIAL

## 2024-08-16 VITALS
TEMPERATURE: 97.2 F | HEIGHT: 65 IN | BODY MASS INDEX: 26.66 KG/M2 | SYSTOLIC BLOOD PRESSURE: 118 MMHG | HEART RATE: 81 BPM | DIASTOLIC BLOOD PRESSURE: 78 MMHG | OXYGEN SATURATION: 98 % | WEIGHT: 160 LBS

## 2024-08-16 PROCEDURE — 74176 CT ABD & PELVIS W/O CONTRAST: CPT

## 2024-08-16 RX ORDER — TAMSULOSIN HYDROCHLORIDE 0.4 MG/1
0.4 CAPSULE ORAL
Qty: 30 | Refills: 3 | Status: ACTIVE | COMMUNITY
Start: 2024-08-16 | End: 1900-01-01

## 2024-08-16 NOTE — ASSESSMENT
[FreeTextEntry1] : 48M with pmhx of kidney stones, chronic low back pain presenting as follow up after visit to ER on 8/13 for worsening left hip/back pain showed U/A with 4 RBCs and calcium oxalate crystals. Patient and wife very upset regarding pain and wants to know the cause of it. States they were told patient has an infection which they believe is the cause of the pain. We discussed his U/A results at length and that some blood in the urine is typical with known hx of R kidney stone. There are no signs of infection on U/A and he is currently not complaining of any urinary symptoms. We discussed at length that his pain seems musculoskeletal in nature. He was seen by orthopedics previously for the same issue and should follow up with them regarding the pain.  I explained that since he recently had an ultrasound done and he has no urological symptoms I would not pursue further work up at this time.   They understand there is low very suspicion that his pain is urological and will follow up with his orthopedic surgeon. However, patient and his wife would still like to pursue further workup with urine culture and CT scan. Patient requested pain medications; however, I explained the most I would be able to prescribe him is flomax for a possible ureteral stone. Patient and wife are agreeable to this. All questions were answered to their satisfaction.   Plan:  - U/A, Urine culture - CT [facility] - Flomax - Patient to follow up with orthopedic for left hip/back pain

## 2024-08-16 NOTE — HISTORY OF PRESENT ILLNESS
[FreeTextEntry1] : 48 year old male with bilateral renal calculi now s/p bilateral URS. Ureteral stents removed on 3/22/22.  He presents today for follow up after surveillance ultrasound. Patient reports that the litholink test was very expensive, he does not wish to have another one as he is unable to pay such a high cost.  8/5: Ordered renal bladder ultrasound for nephrolithiasis surveillance. Patient does not want to do another LItholink evaluation due to the prohibitive cost of the examination.  9/9: patient presents for follow up to discuss ultrasound results and for nephrolithiasis management. Ultrasound reviewed , showing 4mm nonobstructing kidney stones bilaterally. Discussed results with patient. he reports continued symptoms of vague lower back pain that appears musculoskeletal. Denies dysuria gross hematuria, fever and chills. Overall feeling well.  1/5/23: Pain on left flank started Saturday, says that it hurts when he walks, seems musculoskeletal in nature. Ultrasound 11/22 showed bilateral mid pole stones measuring 6 mm each with no hydro. Denies dysuria gross hematuria, fever and chills. Interpretor #[id number]  8/16/24: Presented to ER on 8/13 for worsening left hip/back pain, diagnosed with MSK pain and discharged. U/A showed 4 RBCs and calcium oxalate and was told to follow up with urologist. Denies gross hematuria, dysuria, difficulty voiding, fevers, chills, flank pain, or other urinary symptoms. Most recent imaging with renal U/S in July 2024 showed 3 mm mid pole R kidney stone. Patient and wife very upset regarding pain and wants to know the cause of it.

## 2024-08-16 NOTE — PHYSICAL EXAM
[General Appearance - Well Developed] : well developed [General Appearance - Well Nourished] : well nourished [Normal Appearance] : normal appearance [Abdomen Soft] : soft [Abdomen Tenderness] : non-tender [Costovertebral Angle Tenderness] : no ~M costovertebral angle tenderness [Normal Station and Gait] : the gait and station were normal for the patient's age [de-identified] : No tenderness to palpation in LLQ. Patient states pain is in the left lower abdomen and radiates to left lower back.

## 2024-08-18 LAB
APPEARANCE: CLEAR
BACTERIA UR CULT: NORMAL
BILIRUBIN URINE: NEGATIVE
BLOOD URINE: ABNORMAL
COLOR: YELLOW
GLUCOSE QUALITATIVE U: NEGATIVE MG/DL
KETONES URINE: NEGATIVE MG/DL
LEUKOCYTE ESTERASE URINE: NEGATIVE
NITRITE URINE: NEGATIVE
PH URINE: 6
PROTEIN URINE: 30 MG/DL
SPECIFIC GRAVITY URINE: 1.03
UROBILINOGEN URINE: 0.2 MG/DL

## 2024-08-19 ENCOUNTER — NON-APPOINTMENT (OUTPATIENT)
Age: 49
End: 2024-08-19

## 2024-08-21 ENCOUNTER — APPOINTMENT (OUTPATIENT)
Dept: SURGERY | Facility: CLINIC | Age: 49
End: 2024-08-21
Payer: COMMERCIAL

## 2024-08-21 VITALS
DIASTOLIC BLOOD PRESSURE: 79 MMHG | BODY MASS INDEX: 27.03 KG/M2 | OXYGEN SATURATION: 98 % | TEMPERATURE: 98.2 F | HEIGHT: 65 IN | WEIGHT: 162.25 LBS | RESPIRATION RATE: 18 BRPM | HEART RATE: 91 BPM | SYSTOLIC BLOOD PRESSURE: 119 MMHG

## 2024-08-21 DIAGNOSIS — Z86.19 PERSONAL HISTORY OF OTHER INFECTIOUS AND PARASITIC DISEASES: ICD-10-CM

## 2024-08-21 DIAGNOSIS — M48.061 SPINAL STENOSIS, LUMBAR REGION WITHOUT NEUROGENIC CLAUDICATION: ICD-10-CM

## 2024-08-21 DIAGNOSIS — Z78.9 OTHER SPECIFIED HEALTH STATUS: ICD-10-CM

## 2024-08-21 PROCEDURE — 99204 OFFICE O/P NEW MOD 45 MIN: CPT

## 2024-08-21 RX ORDER — NAPROXEN 500 MG/1
500 TABLET ORAL
Qty: 60 | Refills: 0 | Status: ACTIVE | COMMUNITY
Start: 2024-08-21 | End: 1900-01-01

## 2024-08-21 RX ORDER — ACETAMINOPHEN 500 MG/1
500 TABLET ORAL
Qty: 180 | Refills: 0 | Status: ACTIVE | COMMUNITY
Start: 2024-08-21 | End: 1900-01-01

## 2024-08-21 NOTE — DATA REVIEWED
[FreeTextEntry1] :  Chief Complaint: pain, back and left side back pain/ left side stomach pain. - Chief Complaint: The patient is a 48y Male complaining of pain, back. 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 Patient is able to ambulate. He has tried Tylenol at home with minimal effect. Denies any fecal incontinence, urinary retention or saddle anesthesia.  ALLERGIES AND HOME MEDICATIONS: Allergies: Allergies: No Known Allergies:  Home Medications: * Patient Currently Takes Medications as of 23-May-2024 11:27 documented in Structured Notes - cefuroxime 500 mg oral tablet: 1 tab(s) orally 2 times a day - metroNIDAZOLE 500 mg oral tablet: 1 tab(s) orally every 8 hours  PHYSICAL EXAM: - Physical Examination: General: Patient well appearing, vital signs within normal limits HEENT: MMM, trachea midline Respiratory: No respiratory distress GI: Soft, nontender : No CVA tenderness bilaterally MSK: 5/5 strength with flexion and extension at the ankle/knee/hip bilaterally, sensation grossly intact, 2/4 DTRs of bilateral lower extremities, steady gait; no tenderness to palpation over the lumbar spines, some mild tenderness to palpation over the left lower lumbar paraspinal muscles Neuro: Moves all extremities, see above Skin: No rashes or lesions  CURRENT ORDERS/: - acetaminophen Tablet .., [Known as TYLENOL..] 650 milliGRAM(s), Oral, once, Stop After 1 Doses Administration Instructions: MAX DAILY DOSE: ADULT = 4,000 mG/Day This is a Look-alike/Sound-alike Medication, 13-Aug-2024, Active, Standard - cyclobenzaprine, [Ordered as FLEXERIL] 10 milliGRAM(s), Oral, once, Stop After 1 Doses, 13-Aug-2024, Active, Standard - ketorolac Injectable, [Ordered as TORADOL Injectable] 15 milliGRAM(s), IV Push, Once, Stop After 1 Doses Administration Instructions: IF IV PUSH - Administer over 1 minute. Dispose unused medication in BLACK bin., 13-Aug-2024, Active, Standard - morphine - Injectable, 2 milliGRAM(s), IV Push, Once, Stop After 1 Doses Administration Instructions: This is a Look-alike/Sound-alike Medication This is a High Alert Medication., 13-Aug-2024, Active, Standard - IV Insert, Time/Priority: STAT, 13-Aug-2024, Active, Standard  RESULTS: Wet Read: There are no Wet Read(s) to document.  DISPOSITION: Care Plan - Instructions: Principal Discharge DX: Acute left-sided back pain Secondary Diagnosis: Muscle strain.  Impression: 1.  Principal Discharge Dx Acute left-sided back pain.  Secondary Discharge Dx Muscle strain.  Medical Decision Making: - The following orders were submitted: Medications - Clinical Summary (MDM): Summarize the clinical encounter 48-year-old male who presented with left lower back pain. See exam above. No red flag signs. labs and urinalysis unremarkable. Patient received Tylenol, Toradol, Flexeril and a small dose of morphine in the emergency department. He had a steady gait and again was neurovascularly intact with no weakness. Will discharge him with short course of Tylenol, NSAIDs and muscle relaxant. - Follow-up Instructions (will be supplied to the patient only if discharged) You were evaluated for left lower back pain. It is likely secondary to muscle strain. Your laboratories and urinalysis were negative. Please take medications as prescribed. Follow- up with your doctor.  Strain  A strain is a stretch or tear in one of the muscles in your body. This is caused by an injury to the area such as a twisting mechanism. Symptoms include pain, swelling, or bruising. Rest that area over the next several days and slowly resume activity when tolerated. Ice can help with swelling and pain.  SEEK IMMEDIATE MEDICAL CARE IF YOU HAVE ANY OF THE FOLLOWING SYMPTOMS: worsening pain, inability to move that body part, numbness or tingling.  Disposition: Disposition: DISCHARGE.  Patient requests all Lab, Cardiology, and Radiology Results on their Discharge Instructions.  Discharge Disposition: Home.

## 2024-08-21 NOTE — PHYSICAL EXAM
[JVD] : no jugular venous distention  [Normal Breath Sounds] : Normal breath sounds [Normal Rate and Rhythm] : normal rate and rhythm [2+] : left 2+ [Ankle Swelling (On Exam)] : not present [Abdomen Tenderness] : ~T ~M No abdominal tenderness [Alert] : alert [Oriented to Person] : oriented to person [Oriented to Place] : oriented to place [Oriented to Time] : oriented to time [Calm] : calm [de-identified] : nad speaks Serbian [de-identified] : hussain [de-identified] : back lumbar area with percuission paiun on left  l3 [de-identified] : nl 2 testes, no inguinal hernia  on left-+/- impilse nl testes no hydrocoele, normal right

## 2024-08-21 NOTE — ASSESSMENT
[FreeTextEntry1] : 48y Male complaining of pain, back. 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  He has tried Tylenol at home with minimal effect.   MRI shows lumbar disc changes and stenosis and pt has done PT he needs to return to ortho and PT for evaluation/ potentiual steroids/ injections and or lami or fusion discussed at length with him and his wife with Mongolian interpretopor avoid heavy lifting do his exercises het tylenol ordered as well as naproxen for breakthru pain famtimodine for stomach protection recent ct no bowel issues, has had enteritis in the past- small left ing hernia seen opn ct - not the sourse of his pain, barely noticeable on exam, normal testes, discussed enlarged prostate as well as kidney calculi   complexitymod  risk low  time45

## 2024-08-21 NOTE — HISTORY OF PRESENT ILLNESS
[FreeTextEntry1] : location left back/flank pain  severity no fever, h/o kidney stones  timing/duration 2023 back issues/ mri in january enteritis in february 2024 now new back pain episode this month radiatiopn to left leg  quality as noted  other has seen ortho never rx with steroids

## 2024-08-27 ENCOUNTER — OUTPATIENT (OUTPATIENT)
Dept: OUTPATIENT SERVICES | Facility: HOSPITAL | Age: 49
LOS: 1 days | End: 2024-08-27
Payer: SELF-PAY

## 2024-08-27 ENCOUNTER — APPOINTMENT (OUTPATIENT)
Dept: INTERNAL MEDICINE | Facility: CLINIC | Age: 49
End: 2024-08-27
Payer: COMMERCIAL

## 2024-08-27 ENCOUNTER — RX RENEWAL (OUTPATIENT)
Age: 49
End: 2024-08-27

## 2024-08-27 VITALS
WEIGHT: 166 LBS | OXYGEN SATURATION: 98 % | HEART RATE: 76 BPM | SYSTOLIC BLOOD PRESSURE: 107 MMHG | HEIGHT: 65 IN | BODY MASS INDEX: 27.66 KG/M2 | TEMPERATURE: 97.7 F | DIASTOLIC BLOOD PRESSURE: 73 MMHG

## 2024-08-27 DIAGNOSIS — Z98.890 OTHER SPECIFIED POSTPROCEDURAL STATES: Chronic | ICD-10-CM

## 2024-08-27 DIAGNOSIS — M54.50 LOW BACK PAIN, UNSPECIFIED: ICD-10-CM

## 2024-08-27 DIAGNOSIS — Z00.00 ENCOUNTER FOR GENERAL ADULT MEDICAL EXAMINATION WITHOUT ABNORMAL FINDINGS: ICD-10-CM

## 2024-08-27 DIAGNOSIS — E78.5 HYPERLIPIDEMIA, UNSPECIFIED: ICD-10-CM

## 2024-08-27 DIAGNOSIS — R20.0 ANESTHESIA OF SKIN: ICD-10-CM

## 2024-08-27 PROCEDURE — ZZZZZ: CPT

## 2024-08-27 PROCEDURE — G0463: CPT

## 2024-08-27 RX ORDER — FAMOTIDINE 40 MG/1
40 TABLET, FILM COATED ORAL
Qty: 7 | Refills: 0 | Status: ACTIVE | COMMUNITY
Start: 2024-08-21 | End: 1900-01-01

## 2024-08-27 RX ORDER — GABAPENTIN 100 MG/1
100 CAPSULE ORAL
Qty: 30 | Refills: 0 | Status: ACTIVE | COMMUNITY
Start: 2024-08-27 | End: 1900-01-01

## 2024-08-28 ENCOUNTER — APPOINTMENT (OUTPATIENT)
Dept: INTERNAL MEDICINE | Facility: CLINIC | Age: 49
End: 2024-08-28

## 2024-08-29 DIAGNOSIS — R20.0 ANESTHESIA OF SKIN: ICD-10-CM

## 2024-08-29 DIAGNOSIS — E78.5 HYPERLIPIDEMIA, UNSPECIFIED: ICD-10-CM

## 2024-08-29 DIAGNOSIS — M54.50 LOW BACK PAIN, UNSPECIFIED: ICD-10-CM

## 2024-08-29 NOTE — HISTORY OF PRESENT ILLNESS
[FreeTextEntry1] : Here for low back pain  [de-identified] : 49 yo M with PMHx colitis, transaminitis, nephrolithiasis here for back pain. He has a history of left lower lumbar pain radiating to the lower left abdomen and left leg that started when he stretched his arm that started 3 weeks ago which lead him to go to the ED on 8/13/24. He also reports numbness/tingling at left lateral thigh region. He visited Dr. Gutierrez on 8/21/24 who considered potential steroids/injections and lami or fusion with ortho. Naproxen and tylenol does not help with the back pain. Last MRI lumbar spine was 1/19/24 which showed multilevel foraminal narrowing and mild spinal canal stenosis at L3-L4 and L4-L5. He went to PT yesterday.  Appointment with orthopedic is 9/25.

## 2024-08-29 NOTE — END OF VISIT
[] : Resident [FreeTextEntry3] : I, Dr. Jordan Neville, saw and evaluated this patient in the presence of the resident. I discussed the management with the resident. I reviewed the note and agree with the documented plan of care with the following confirmations/corrections/additions: None.

## 2024-08-29 NOTE — REVIEW OF SYSTEMS
[Muscle Pain] : muscle pain [Back Pain] : back pain [Chest Pain] : no chest pain [Shortness Of Breath] : no shortness of breath [Dysuria] : no dysuria [FreeTextEntry9] : back pain radiating to left lower abdomen and left groin

## 2024-08-29 NOTE — PLAN
[FreeTextEntry1] : 47 yo M with PMHx colitis, transaminitis, nephrolithiasis here for a follow up   1) Low back pain - reports a 3 week history of low back pain that radiates to left lower abdomen and left leg and intermittent numbness at left lateral thigh region --> visited general surgery who recommended potential steroids/injections, lami or fusion with ortho - MRI lumbar spine at 1/19/24 showed multilevel foraminal narrowing and mild spinal canal stenosis at L3-L4 and L4-L5.  - Orthopedics appt is scheduled 9/25/24, sent referral for orthopedics - start gabapentin 100mg nightly for numbness - avoid heavy lifting - wrote a letter for work  HLD - monitor lipid profile  f/u in 2 months

## 2024-08-29 NOTE — PHYSICAL EXAM
[No Respiratory Distress] : no respiratory distress  [No Accessory Muscle Use] : no accessory muscle use [Clear to Auscultation] : lungs were clear to auscultation bilaterally [Normal Rate] : normal rate  [Regular Rhythm] : with a regular rhythm [Normal S1, S2] : normal S1 and S2 [No Edema] : there was no peripheral edema [No Acute Distress] : no acute distress [Well Nourished] : well nourished [Well Developed] : well developed [Well-Appearing] : well-appearing [EOMI] : extraocular movements intact [Supple] : supple [Soft] : abdomen soft [Non Tender] : non-tender [Normal Bowel Sounds] : normal bowel sounds [de-identified] : full active ROM of left hip. mild numbness at left lateral thigh with raised left leg but denies numbness radiating down the entire left leg

## 2024-08-29 NOTE — PHYSICAL EXAM
[No Respiratory Distress] : no respiratory distress  [No Accessory Muscle Use] : no accessory muscle use [Clear to Auscultation] : lungs were clear to auscultation bilaterally [Normal Rate] : normal rate  [Regular Rhythm] : with a regular rhythm [Normal S1, S2] : normal S1 and S2 [No Edema] : there was no peripheral edema [No Acute Distress] : no acute distress [Well Nourished] : well nourished [Well Developed] : well developed [Well-Appearing] : well-appearing [EOMI] : extraocular movements intact [Supple] : supple [Soft] : abdomen soft [Non Tender] : non-tender [Normal Bowel Sounds] : normal bowel sounds [de-identified] : full active ROM of left hip. mild numbness at left lateral thigh with raised left leg but denies numbness radiating down the entire left leg

## 2024-08-29 NOTE — HISTORY OF PRESENT ILLNESS
[FreeTextEntry1] : Here for low back pain  [de-identified] : 47 yo M with PMHx colitis, transaminitis, nephrolithiasis here for back pain. He has a history of left lower lumbar pain radiating to the lower left abdomen and left leg that started when he stretched his arm that started 3 weeks ago which lead him to go to the ED on 8/13/24. He also reports numbness/tingling at left lateral thigh region. He visited Dr. Gutierrez on 8/21/24 who considered potential steroids/injections and lami or fusion with ortho. Naproxen and tylenol does not help with the back pain. Last MRI lumbar spine was 1/19/24 which showed multilevel foraminal narrowing and mild spinal canal stenosis at L3-L4 and L4-L5. He went to PT yesterday.  Appointment with orthopedic is 9/25.

## 2024-08-29 NOTE — PHYSICAL EXAM
[No Respiratory Distress] : no respiratory distress  [No Accessory Muscle Use] : no accessory muscle use [Clear to Auscultation] : lungs were clear to auscultation bilaterally [Normal Rate] : normal rate  [Regular Rhythm] : with a regular rhythm [Normal S1, S2] : normal S1 and S2 [No Edema] : there was no peripheral edema [No Acute Distress] : no acute distress [Well Nourished] : well nourished [Well Developed] : well developed [Well-Appearing] : well-appearing [EOMI] : extraocular movements intact [Supple] : supple [Soft] : abdomen soft [Non Tender] : non-tender [Normal Bowel Sounds] : normal bowel sounds [de-identified] : full active ROM of left hip. mild numbness at left lateral thigh with raised left leg but denies numbness radiating down the entire left leg

## 2024-08-29 NOTE — PLAN
[FreeTextEntry1] : 49 yo M with PMHx colitis, transaminitis, nephrolithiasis here for a follow up   1) Low back pain - reports a 3 week history of low back pain that radiates to left lower abdomen and left leg and intermittent numbness at left lateral thigh region --> visited general surgery who recommended potential steroids/injections, lami or fusion with ortho - MRI lumbar spine at 1/19/24 showed multilevel foraminal narrowing and mild spinal canal stenosis at L3-L4 and L4-L5.  - Orthopedics appt is scheduled 9/25/24, sent referral for orthopedics - start gabapentin 100mg nightly for numbness - avoid heavy lifting - wrote a letter for work  HLD - monitor lipid profile  f/u in 2 months

## 2024-08-29 NOTE — HISTORY OF PRESENT ILLNESS
[FreeTextEntry1] : Here for low back pain  [de-identified] : 47 yo M with PMHx colitis, transaminitis, nephrolithiasis here for back pain. He has a history of left lower lumbar pain radiating to the lower left abdomen and left leg that started when he stretched his arm that started 3 weeks ago which lead him to go to the ED on 8/13/24. He also reports numbness/tingling at left lateral thigh region. He visited Dr. Gutierrez on 8/21/24 who considered potential steroids/injections and lami or fusion with ortho. Naproxen and tylenol does not help with the back pain. Last MRI lumbar spine was 1/19/24 which showed multilevel foraminal narrowing and mild spinal canal stenosis at L3-L4 and L4-L5. He went to PT yesterday.  Appointment with orthopedic is 9/25.

## 2024-09-09 NOTE — ED PROVIDER NOTE - IV ALTEPLASE ADMIN OUTSIDE HIDDEN
Standard Miralax Bowel Prep recommended due to standard bowel prep. Instructions were sent via letter.    show

## 2024-09-25 ENCOUNTER — APPOINTMENT (OUTPATIENT)
Dept: ORTHOPEDIC SURGERY | Facility: HOSPITAL | Age: 49
End: 2024-09-25

## 2024-09-30 ENCOUNTER — RX RENEWAL (OUTPATIENT)
Age: 49
End: 2024-09-30

## 2024-09-30 RX ORDER — ACETAMINOPHEN EXTRA STRENGTH 500 MG/1
500 TABLET ORAL
Qty: 180 | Refills: 0 | Status: ACTIVE | COMMUNITY
Start: 2024-09-30 | End: 1900-01-01

## 2024-10-05 NOTE — ASU PREOP CHECKLIST - SURGICAL CONSENT
A1c is even higher.    Nursing advice: I would advise adding meal time insulin for biggest meal.  Would use 5 units for biggest meal with correction scale 150-200:1units/ 201-250:2units/ 251-300:3units/ 301-350:4units/ 351-400:5units/>400:6units. He is unfamiliar with correction scale so he will need to see DM education soon to discuss this.   
CRISTINA-65 Ab negative.  IA-2 Ab negative.  Only ZnT8 Ab remains
ZnT8 Ab is negative.  
done

## 2025-06-11 NOTE — ED PROVIDER NOTE - CHIEF COMPLAINT
Incoming fax requesting gabapentin;     Disp Refills Start End    gabapentin (NEURONTIN) 100 MG capsule 120 capsule 1 3/12/2025 --      Called patient, per Patient she doesn't need refill; not taking every day;  Takes 2 capsules every couple days;    Scheduled for follow up 7/2/25 at 3:15 -  clinic    Disregard Rx refill request   The patient is a 48y Male complaining of pain, back.

## (undated) DEVICE — CATH IV SAFE BC 22G X 1" (BLUE)

## (undated) DEVICE — BIOPSY PORT SELF SEALING DISP

## (undated) DEVICE — CATH IV SAFE BC 20G X 1.16" (PINK)

## (undated) DEVICE — BRUSH COLONOSCOPY CYTOLOGY

## (undated) DEVICE — TUBING SUCTION 20FT

## (undated) DEVICE — SOL INJ NS 0.9% 500ML 2 PORT

## (undated) DEVICE — SUCTION YANKAUER NO CONTROL VENT

## (undated) DEVICE — ELCTR GROUNDING PAD ADULT COVIDIEN

## (undated) DEVICE — POLY TRAP ETRAP

## (undated) DEVICE — TUBING RANGER FLUID IRRIGATION SET DISP

## (undated) DEVICE — PACK IV START WITH CHG

## (undated) DEVICE — SOL IRR POUR NS 0.9% 500ML

## (undated) DEVICE — FORCEP RADIAL JAW 4 JUMBO 2.8MM 3.2MM 240CM ORANGE DISP

## (undated) DEVICE — SYR LUER LOK 50CC

## (undated) DEVICE — BLANKET WARMER UPPER ADULT

## (undated) DEVICE — ADAPTER UROLOK

## (undated) DEVICE — SENSOR O2 FINGER ADULT

## (undated) DEVICE — TUBING SUCTION CONN 6FT STERILE

## (undated) DEVICE — TUBING IV SET GRAVITY 3Y 100" MACRO

## (undated) DEVICE — CLAMP BX HOT RAD JAW 3

## (undated) DEVICE — IRR BULB PATHFINDER + 10"

## (undated) DEVICE — FOLEY TRAY 16FR 5CC LTX UMETER CLOSED

## (undated) DEVICE — IRRIGATOR BIO SHIELD

## (undated) DEVICE — GLV 7 PROTEXIS

## (undated) DEVICE — WRAP COMPRESSION CALF LG

## (undated) DEVICE — BIOPSY FORCEP RADIAL JAW 4 STANDARD WITH NEEDLE

## (undated) DEVICE — COVER CAP 27" DEPTH

## (undated) DEVICE — GLV 6.5 PROTEXIS

## (undated) DEVICE — TUBING TUR 2 PRONG

## (undated) DEVICE — POSITIONER HEAD REST PRONE

## (undated) DEVICE — SYR ALLIANCE II INFLATION 60ML

## (undated) DEVICE — SENSOR O2 FINGER ADULT 24/CA

## (undated) DEVICE — WRAP COMPRESSION CALF MED

## (undated) DEVICE — FOLEY HOLDER STATLOCK 2 WAY ADULT

## (undated) DEVICE — SNARE CAPTIVATOR COLD RND STIFF 10X2.4X2.8MM 240CM

## (undated) DEVICE — URETEROSCOPE LITHOVUE DISP

## (undated) DEVICE — PACK CYSTO

## (undated) DEVICE — SYR ASEPTO

## (undated) DEVICE — GLV 7.5 PROTEXIS

## (undated) DEVICE — POSITIONER FOAM EGG CRATE ULNAR (2PCS)

## (undated) DEVICE — BALLOON US ENDO

## (undated) DEVICE — GLV 8 PROTEXIS

## (undated) DEVICE — DRAPE C ARM UNIVERSAL

## (undated) DEVICE — SOL IRR BAG NS 0.9% 3000ML

## (undated) DEVICE — SOL IRR POUR H2O 1500ML

## (undated) DEVICE — BITE BLOCK ADULT 20 X 27MM (GREEN)

## (undated) DEVICE — GOWN TRIMAX LG

## (undated) DEVICE — SOL IRR BAG H2O 3000ML